# Patient Record
Sex: MALE | Race: WHITE | Employment: FULL TIME | URBAN - METROPOLITAN AREA
[De-identification: names, ages, dates, MRNs, and addresses within clinical notes are randomized per-mention and may not be internally consistent; named-entity substitution may affect disease eponyms.]

---

## 2019-07-02 ENCOUNTER — APPOINTMENT (OUTPATIENT)
Dept: GENERAL RADIOLOGY | Age: 54
DRG: 310 | End: 2019-07-02
Payer: COMMERCIAL

## 2019-07-02 ENCOUNTER — HOSPITAL ENCOUNTER (INPATIENT)
Age: 54
LOS: 5 days | Discharge: HOME | DRG: 310 | End: 2019-07-07
Attending: EMERGENCY MEDICINE | Admitting: FAMILY MEDICINE
Payer: COMMERCIAL

## 2019-07-02 DIAGNOSIS — R00.1 SYMPTOMATIC BRADYCARDIA: Primary | ICD-10-CM

## 2019-07-02 LAB
ABSOLUTE EOS #: 0.13 K/UL (ref 0–0.44)
ABSOLUTE IMMATURE GRANULOCYTE: <0.03 K/UL (ref 0–0.3)
ABSOLUTE LYMPH #: 2.74 K/UL (ref 1.1–3.7)
ABSOLUTE MONO #: 0.59 K/UL (ref 0.1–1.2)
ANION GAP SERPL CALCULATED.3IONS-SCNC: 12 MMOL/L (ref 9–17)
BASOPHILS # BLD: 1 % (ref 0–2)
BASOPHILS ABSOLUTE: 0.05 K/UL (ref 0–0.2)
BUN BLDV-MCNC: 13 MG/DL (ref 6–20)
BUN/CREAT BLD: ABNORMAL (ref 9–20)
CALCIUM SERPL-MCNC: 9.6 MG/DL (ref 8.6–10.4)
CHLORIDE BLD-SCNC: 109 MMOL/L (ref 98–107)
CO2: 19 MMOL/L (ref 20–31)
CREAT SERPL-MCNC: 1 MG/DL (ref 0.7–1.2)
DIFFERENTIAL TYPE: NORMAL
EOSINOPHILS RELATIVE PERCENT: 2 % (ref 1–4)
GFR AFRICAN AMERICAN: >60 ML/MIN
GFR NON-AFRICAN AMERICAN: >60 ML/MIN
GFR SERPL CREATININE-BSD FRML MDRD: ABNORMAL ML/MIN/{1.73_M2}
GFR SERPL CREATININE-BSD FRML MDRD: ABNORMAL ML/MIN/{1.73_M2}
GLUCOSE BLD-MCNC: 86 MG/DL (ref 70–99)
HCT VFR BLD CALC: 44.1 % (ref 40.7–50.3)
HEMOGLOBIN: 14.6 G/DL (ref 13–17)
IMMATURE GRANULOCYTES: 0 %
LYMPHOCYTES # BLD: 43 % (ref 24–43)
MAGNESIUM: 2 MG/DL (ref 1.6–2.6)
MCH RBC QN AUTO: 30.9 PG (ref 25.2–33.5)
MCHC RBC AUTO-ENTMCNC: 33.1 G/DL (ref 28.4–34.8)
MCV RBC AUTO: 93.4 FL (ref 82.6–102.9)
MONOCYTES # BLD: 9 % (ref 3–12)
NRBC AUTOMATED: 0 PER 100 WBC
PDW BLD-RTO: 12.6 % (ref 11.8–14.4)
PLATELET # BLD: 262 K/UL (ref 138–453)
PLATELET ESTIMATE: NORMAL
PMV BLD AUTO: 10.7 FL (ref 8.1–13.5)
POTASSIUM SERPL-SCNC: 3.8 MMOL/L (ref 3.7–5.3)
RBC # BLD: 4.72 M/UL (ref 4.21–5.77)
RBC # BLD: NORMAL 10*6/UL
SEG NEUTROPHILS: 45 % (ref 36–65)
SEGMENTED NEUTROPHILS ABSOLUTE COUNT: 2.79 K/UL (ref 1.5–8.1)
SODIUM BLD-SCNC: 140 MMOL/L (ref 135–144)
TROPONIN INTERP: NORMAL
TROPONIN T: NORMAL NG/ML
TROPONIN, HIGH SENSITIVITY: <6 NG/L (ref 0–22)
TSH SERPL DL<=0.05 MIU/L-ACNC: 1.77 MIU/L (ref 0.3–5)
WBC # BLD: 6.3 K/UL (ref 3.5–11.3)
WBC # BLD: NORMAL 10*3/UL

## 2019-07-02 PROCEDURE — 80048 BASIC METABOLIC PNL TOTAL CA: CPT

## 2019-07-02 PROCEDURE — 84484 ASSAY OF TROPONIN QUANT: CPT

## 2019-07-02 PROCEDURE — 83735 ASSAY OF MAGNESIUM: CPT

## 2019-07-02 PROCEDURE — 1200000000 HC SEMI PRIVATE

## 2019-07-02 PROCEDURE — 85025 COMPLETE CBC W/AUTO DIFF WBC: CPT

## 2019-07-02 PROCEDURE — 71045 X-RAY EXAM CHEST 1 VIEW: CPT

## 2019-07-02 PROCEDURE — 84443 ASSAY THYROID STIM HORMONE: CPT

## 2019-07-02 PROCEDURE — 93005 ELECTROCARDIOGRAM TRACING: CPT

## 2019-07-02 PROCEDURE — 36415 COLL VENOUS BLD VENIPUNCTURE: CPT

## 2019-07-02 PROCEDURE — 99285 EMERGENCY DEPT VISIT HI MDM: CPT

## 2019-07-02 RX ORDER — MAGNESIUM SULFATE 1 G/100ML
1 INJECTION INTRAVENOUS PRN
Status: DISCONTINUED | OUTPATIENT
Start: 2019-07-02 | End: 2019-07-07 | Stop reason: HOSPADM

## 2019-07-02 RX ORDER — PREGABALIN 100 MG/1
100 CAPSULE ORAL 3 TIMES DAILY
Status: DISCONTINUED | OUTPATIENT
Start: 2019-07-02 | End: 2019-07-07 | Stop reason: HOSPADM

## 2019-07-02 RX ORDER — BUPROPION HYDROCHLORIDE 150 MG/1
150 TABLET ORAL EVERY MORNING
Status: DISCONTINUED | OUTPATIENT
Start: 2019-07-03 | End: 2019-07-07 | Stop reason: HOSPADM

## 2019-07-02 RX ORDER — ACETAMINOPHEN 325 MG/1
650 TABLET ORAL EVERY 4 HOURS PRN
Status: DISCONTINUED | OUTPATIENT
Start: 2019-07-02 | End: 2019-07-07 | Stop reason: HOSPADM

## 2019-07-02 RX ORDER — POTASSIUM CHLORIDE 20 MEQ/1
40 TABLET, EXTENDED RELEASE ORAL PRN
Status: DISCONTINUED | OUTPATIENT
Start: 2019-07-02 | End: 2019-07-07 | Stop reason: HOSPADM

## 2019-07-02 RX ORDER — SODIUM CHLORIDE 0.9 % (FLUSH) 0.9 %
10 SYRINGE (ML) INJECTION EVERY 12 HOURS SCHEDULED
Status: DISCONTINUED | OUTPATIENT
Start: 2019-07-02 | End: 2019-07-07 | Stop reason: HOSPADM

## 2019-07-02 RX ORDER — LORAZEPAM 0.5 MG/1
0.5 TABLET ORAL NIGHTLY
Status: DISCONTINUED | OUTPATIENT
Start: 2019-07-02 | End: 2019-07-07 | Stop reason: HOSPADM

## 2019-07-02 RX ORDER — ATORVASTATIN CALCIUM 40 MG/1
40 TABLET, FILM COATED ORAL NIGHTLY
Status: DISCONTINUED | OUTPATIENT
Start: 2019-07-02 | End: 2019-07-07 | Stop reason: HOSPADM

## 2019-07-02 RX ORDER — SODIUM CHLORIDE 0.9 % (FLUSH) 0.9 %
10 SYRINGE (ML) INJECTION PRN
Status: DISCONTINUED | OUTPATIENT
Start: 2019-07-02 | End: 2019-07-07 | Stop reason: HOSPADM

## 2019-07-02 RX ORDER — POTASSIUM CHLORIDE 7.45 MG/ML
10 INJECTION INTRAVENOUS PRN
Status: DISCONTINUED | OUTPATIENT
Start: 2019-07-02 | End: 2019-07-07 | Stop reason: HOSPADM

## 2019-07-02 RX ORDER — ASPIRIN 81 MG/1
81 TABLET, CHEWABLE ORAL DAILY
Status: DISCONTINUED | OUTPATIENT
Start: 2019-07-03 | End: 2019-07-07 | Stop reason: HOSPADM

## 2019-07-02 RX ORDER — ONDANSETRON 2 MG/ML
4 INJECTION INTRAMUSCULAR; INTRAVENOUS EVERY 6 HOURS PRN
Status: DISCONTINUED | OUTPATIENT
Start: 2019-07-02 | End: 2019-07-07 | Stop reason: HOSPADM

## 2019-07-02 ASSESSMENT — ENCOUNTER SYMPTOMS
BACK PAIN: 0
EYE ITCHING: 0
EYE REDNESS: 0
CONSTIPATION: 0
NAUSEA: 0
COUGH: 0
SHORTNESS OF BREATH: 0
DIARRHEA: 0
ABDOMINAL PAIN: 0
RHINORRHEA: 0
VOMITING: 0

## 2019-07-02 ASSESSMENT — PAIN DESCRIPTION - DESCRIPTORS: DESCRIPTORS: BURNING;TIGHTNESS

## 2019-07-02 ASSESSMENT — PAIN DESCRIPTION - PAIN TYPE: TYPE: ACUTE PAIN

## 2019-07-02 ASSESSMENT — PAIN DESCRIPTION - ORIENTATION: ORIENTATION: MID

## 2019-07-02 ASSESSMENT — PAIN DESCRIPTION - LOCATION: LOCATION: CHEST

## 2019-07-02 ASSESSMENT — PAIN SCALES - GENERAL: PAINLEVEL_OUTOF10: 3

## 2019-07-02 ASSESSMENT — PAIN DESCRIPTION - FREQUENCY: FREQUENCY: CONTINUOUS

## 2019-07-02 NOTE — ED PROVIDER NOTES
children: Not on file    Years of education: Not on file    Highest education level: Not on file   Occupational History    Occupation:    Social Needs    Financial resource strain: Not on file    Food insecurity:     Worry: Not on file     Inability: Not on file    Transportation needs:     Medical: Not on file     Non-medical: Not on file   Tobacco Use    Smoking status: Former Smoker     Last attempt to quit: 2009     Years since quittin.9    Smokeless tobacco: Never Used    Tobacco comment: quit 6 years ago   Substance and Sexual Activity    Alcohol use: Yes     Comment: rarely    Drug use: No    Sexual activity: Not on file   Lifestyle    Physical activity:     Days per week: Not on file     Minutes per session: Not on file    Stress: Not on file   Relationships    Social connections:     Talks on phone: Not on file     Gets together: Not on file     Attends Zoroastrian service: Not on file     Active member of club or organization: Not on file     Attends meetings of clubs or organizations: Not on file     Relationship status: Not on file    Intimate partner violence:     Fear of current or ex partner: Not on file     Emotionally abused: Not on file     Physically abused: Not on file     Forced sexual activity: Not on file   Other Topics Concern    Not on file   Social History Narrative    Not on file       History reviewed. No pertinent family history. Allergies:  Seasonal    Home Medications:  Prior to Admission medications    Medication Sig Start Date End Date Taking?  Authorizing Provider   buPROPion (WELLBUTRIN XL) 150 MG XL tablet Take 150 mg by mouth every morning   Yes Historical Provider, MD   eszopiclone (LUNESTA) 1 MG TABS Take 1 mg by mouth nightly as needed  3/2/16  Yes Historical Provider, MD   tiZANidine (ZANAFLEX) 4 MG tablet Take 4 mg by mouth every 8 hours as needed (spasms)  3/7/16  Yes Historical Provider, MD   topiramate (TOPAMAX SPRINKLE) 25 MG capsule Troponin   Result Value Ref Range    Troponin, High Sensitivity <6 0 - 22 ng/L    Troponin T NOT REPORTED <0.03 ng/mL    Troponin Interp NOT REPORTED        IMPRESSION: Andrey Guerrier is a 48 y.o. presenting with symptomatic bradycardia. Patient's blood pressure is stable, patient denies symptoms at rest.  Patient does have substernal chest pain that is burning in nature consisting prior episodes of GERD. Will be given Protonix from the hospital.  Patient likely needs to be admitted for cardiology evaluation considering his heart rate does drop into the 30s and he is having periods of blackout. Lungs are clear, heart is normal, current heart rate 62. RADIOLOGY:  XR CHEST PORTABLE   Final Result   No acute cardiopulmonary process. EKG  Borderline prolonged NM interval, occasional PVC    All EKG's are interpreted by the Emergency Department Physician who either signs or Co-signs this chart in the absence of a cardiologist.    EMERGENCY DEPARTMENT COURSE:  Troponins negative, laboratory evaluation unremarkable. Patient admitted [facility] for symptomatic bradycardia. PROCEDURES:  None    CONSULTS:  IP CONSULT TO INTERNAL MEDICINE  IP CONSULT TO HOSPITALIST  IP CONSULT TO CARDIOLOGY    CRITICAL CARE:  None    FINAL IMPRESSION      1.  Symptomatic bradycardia          DISPOSITION / PLAN     DISPOSITION Admitted 07/02/2019 08:03:57 PM      PATIENT REFERRED TO:  Clearance DO Smiley  [address]  [address] [facility] [address]  [phone]            DISCHARGE MEDICATIONS:  Current Discharge Medication List          Yifan Andrea MD  Emergency Medicine Resident    (Please note that portions of thisnote were completed with a voice recognition program.  Efforts were made to edit the dictations but occasionally words are mis-transcribed.)        Yifan Andrea MD  07/03/19 [id number]

## 2019-07-02 NOTE — ED NOTES
Pt resting on cot, no distress at this time, family at bedside, monitor in place. Will cont to monitor.      Angie Ryan RN  07/02/19 0085

## 2019-07-03 ENCOUNTER — APPOINTMENT (OUTPATIENT)
Dept: NUCLEAR MEDICINE | Age: 54
DRG: 310 | End: 2019-07-03
Payer: COMMERCIAL

## 2019-07-03 LAB
ALBUMIN SERPL-MCNC: 3.9 G/DL (ref 3.5–5.2)
ALBUMIN/GLOBULIN RATIO: 1.6 (ref 1–2.5)
ALP BLD-CCNC: 65 U/L (ref 40–129)
ALT SERPL-CCNC: 24 U/L (ref 5–41)
ANION GAP SERPL CALCULATED.3IONS-SCNC: 11 MMOL/L (ref 9–17)
AST SERPL-CCNC: 21 U/L
BILIRUB SERPL-MCNC: 0.55 MG/DL (ref 0.3–1.2)
BUN BLDV-MCNC: 14 MG/DL (ref 6–20)
BUN/CREAT BLD: ABNORMAL (ref 9–20)
CALCIUM SERPL-MCNC: 8.6 MG/DL (ref 8.6–10.4)
CHLORIDE BLD-SCNC: 109 MMOL/L (ref 98–107)
CHOLESTEROL/HDL RATIO: 5.1
CHOLESTEROL: 178 MG/DL
CO2: 19 MMOL/L (ref 20–31)
CREAT SERPL-MCNC: 0.96 MG/DL (ref 0.7–1.2)
EKG ATRIAL RATE: 56 BPM
EKG ATRIAL RATE: 57 BPM
EKG P AXIS: 2 DEGREES
EKG P AXIS: 20 DEGREES
EKG P-R INTERVAL: 198 MS
EKG P-R INTERVAL: 202 MS
EKG Q-T INTERVAL: 442 MS
EKG Q-T INTERVAL: 456 MS
EKG QRS DURATION: 116 MS
EKG QRS DURATION: 116 MS
EKG QTC CALCULATION (BAZETT): 426 MS
EKG QTC CALCULATION (BAZETT): 443 MS
EKG R AXIS: 26 DEGREES
EKG R AXIS: 37 DEGREES
EKG T AXIS: 22 DEGREES
EKG T AXIS: 27 DEGREES
EKG VENTRICULAR RATE: 56 BPM
EKG VENTRICULAR RATE: 57 BPM
GFR AFRICAN AMERICAN: >60 ML/MIN
GFR NON-AFRICAN AMERICAN: >60 ML/MIN
GFR SERPL CREATININE-BSD FRML MDRD: ABNORMAL ML/MIN/{1.73_M2}
GFR SERPL CREATININE-BSD FRML MDRD: ABNORMAL ML/MIN/{1.73_M2}
GLUCOSE BLD-MCNC: 92 MG/DL (ref 70–99)
HCT VFR BLD CALC: 42 % (ref 40.7–50.3)
HDLC SERPL-MCNC: 35 MG/DL
HEMOGLOBIN: 14.3 G/DL (ref 13–17)
LDL CHOLESTEROL: 115 MG/DL (ref 0–130)
MAGNESIUM: 2 MG/DL (ref 1.6–2.6)
MCH RBC QN AUTO: 31.4 PG (ref 25.2–33.5)
MCHC RBC AUTO-ENTMCNC: 34 G/DL (ref 28.4–34.8)
MCV RBC AUTO: 92.3 FL (ref 82.6–102.9)
NRBC AUTOMATED: 0 PER 100 WBC
PDW BLD-RTO: 12.5 % (ref 11.8–14.4)
PLATELET # BLD: 241 K/UL (ref 138–453)
PMV BLD AUTO: 10.8 FL (ref 8.1–13.5)
POTASSIUM SERPL-SCNC: 3.7 MMOL/L (ref 3.7–5.3)
RBC # BLD: 4.55 M/UL (ref 4.21–5.77)
SODIUM BLD-SCNC: 139 MMOL/L (ref 135–144)
TOTAL PROTEIN: 6.4 G/DL (ref 6.4–8.3)
TRIGL SERPL-MCNC: 139 MG/DL
TROPONIN INTERP: NORMAL
TROPONIN T: NORMAL NG/ML
TROPONIN, HIGH SENSITIVITY: <6 NG/L (ref 0–22)
VLDLC SERPL CALC-MCNC: ABNORMAL MG/DL (ref 1–30)
WBC # BLD: 6.2 K/UL (ref 3.5–11.3)

## 2019-07-03 PROCEDURE — 84484 ASSAY OF TROPONIN QUANT: CPT

## 2019-07-03 PROCEDURE — 3430000000 HC RX DIAGNOSTIC RADIOPHARMACEUTICAL: Performed by: FAMILY MEDICINE

## 2019-07-03 PROCEDURE — 93005 ELECTROCARDIOGRAM TRACING: CPT | Performed by: NURSE PRACTITIONER

## 2019-07-03 PROCEDURE — 6370000000 HC RX 637 (ALT 250 FOR IP): Performed by: NURSE PRACTITIONER

## 2019-07-03 PROCEDURE — 1200000000 HC SEMI PRIVATE

## 2019-07-03 PROCEDURE — 2580000003 HC RX 258: Performed by: NURSE PRACTITIONER

## 2019-07-03 PROCEDURE — 2580000003 HC RX 258: Performed by: FAMILY MEDICINE

## 2019-07-03 PROCEDURE — 81003 URINALYSIS AUTO W/O SCOPE: CPT

## 2019-07-03 PROCEDURE — 83735 ASSAY OF MAGNESIUM: CPT

## 2019-07-03 PROCEDURE — 85027 COMPLETE CBC AUTOMATED: CPT

## 2019-07-03 PROCEDURE — 6360000002 HC RX W HCPCS: Performed by: NURSE PRACTITIONER

## 2019-07-03 PROCEDURE — 99223 1ST HOSP IP/OBS HIGH 75: CPT | Performed by: FAMILY MEDICINE

## 2019-07-03 PROCEDURE — 95816 EEG AWAKE AND DROWSY: CPT

## 2019-07-03 PROCEDURE — A9500 TC99M SESTAMIBI: HCPCS | Performed by: FAMILY MEDICINE

## 2019-07-03 PROCEDURE — 80053 COMPREHEN METABOLIC PANEL: CPT

## 2019-07-03 PROCEDURE — 36415 COLL VENOUS BLD VENIPUNCTURE: CPT

## 2019-07-03 PROCEDURE — 80061 LIPID PANEL: CPT

## 2019-07-03 RX ADMIN — DESMOPRESSIN ACETATE 40 MG: 0.2 TABLET ORAL at 22:13

## 2019-07-03 RX ADMIN — PREGABALIN 100 MG: 100 CAPSULE ORAL at 00:16

## 2019-07-03 RX ADMIN — LORAZEPAM 0.5 MG: 0.5 TABLET ORAL at 22:13

## 2019-07-03 RX ADMIN — TETRAKIS(2-METHOXYISOBUTYLISOCYANIDE)COPPER(I) TETRAFLUOROBORATE 35 MILLICURIE: 1 INJECTION, POWDER, LYOPHILIZED, FOR SOLUTION INTRAVENOUS at 14:40

## 2019-07-03 RX ADMIN — LORAZEPAM 0.5 MG: 0.5 TABLET ORAL at 00:16

## 2019-07-03 RX ADMIN — PREGABALIN 100 MG: 100 CAPSULE ORAL at 22:13

## 2019-07-03 RX ADMIN — SODIUM CHLORIDE, PRESERVATIVE FREE 10 ML: 5 INJECTION INTRAVENOUS at 14:40

## 2019-07-03 RX ADMIN — BUPROPION HYDROCHLORIDE 150 MG: 150 TABLET, FILM COATED, EXTENDED RELEASE ORAL at 09:46

## 2019-07-03 RX ADMIN — ENOXAPARIN SODIUM 40 MG: 40 INJECTION SUBCUTANEOUS at 09:46

## 2019-07-03 RX ADMIN — PREGABALIN 100 MG: 100 CAPSULE ORAL at 09:46

## 2019-07-03 RX ADMIN — Medication 10 ML: at 09:47

## 2019-07-03 RX ADMIN — PREGABALIN 100 MG: 100 CAPSULE ORAL at 14:56

## 2019-07-03 RX ADMIN — ASPIRIN 81 MG: 81 TABLET, CHEWABLE ORAL at 09:46

## 2019-07-03 RX ADMIN — Medication 10 ML: at 22:13

## 2019-07-03 ASSESSMENT — ENCOUNTER SYMPTOMS
DIARRHEA: 0
BLOOD IN STOOL: 0
SORE THROAT: 0
SHORTNESS OF BREATH: 1
WHEEZING: 0
NAUSEA: 0
CONSTIPATION: 0
ABDOMINAL PAIN: 0
VOICE CHANGE: 0
VOMITING: 0
SINUS PRESSURE: 0
COUGH: 0

## 2019-07-03 NOTE — PLAN OF CARE
Pt updated on plan of care, encouraged to call out as needed to prevent falls, pt verbalized understanding, will continue to monitor

## 2019-07-03 NOTE — ED NOTES
Pt resting on stretcher, no respiratory distress noted, pt updated on plan of care, will continue to monitor, call light in reach.        Shaunna Au RN  07/02/19 2009

## 2019-07-03 NOTE — ED NOTES
Report given to [address] [place] [address] on CAR 2. All questions answered at this time.      Saintclair Nick, RN  07/02/19 [id number]

## 2019-07-03 NOTE — H&P
degrees    T Axis 27 degrees       Imaging/Diagonstics:    Xr Chest Portable    Result Date: 7/2/2019  No acute cardiopulmonary process. Assessment :      Primary Problem  Symptomatic bradycardia    Active Hospital Problems    Diagnosis Date Noted    Symptomatic bradycardia [R00.1] 07/02/2019       Plan:     Patient status Admit as inpatient in the  Med/Surge    1. Symptomatic bradycardia - HR is > 60 , continue to monitor . Consult cardiology . Check Stress test . Check echo . 2. Episode of confusion . Check CT head, EEG  and neurology consult   3. H/O SVT s/p Abation in 2015 Dr Shwetha Mason     Consultations:   Patty Meghan    Patient is admitted as inpatient status because of co-morbidities listed above, severity of signs and symptoms as outlined, requirement for current medical therapies and most importantly because of direct risk to patient if care not provided in a hospital setting.     Jacques Harvey MD  7/3/2019    Copy sent to Dr. Inocencia Aguilar DO    (Please note that portions of this note were completed with a voice recognition program. Efforts were made to edit the dictations but occasionally words are mis-transcribed.)

## 2019-07-04 ENCOUNTER — APPOINTMENT (OUTPATIENT)
Dept: CT IMAGING | Age: 54
DRG: 310 | End: 2019-07-04
Payer: COMMERCIAL

## 2019-07-04 LAB
BILIRUBIN URINE: NEGATIVE
COLOR: YELLOW
COMMENT UA: NORMAL
FOLATE: 16.7 NG/ML
GLUCOSE URINE: NEGATIVE
KETONES, URINE: NEGATIVE
LEUKOCYTE ESTERASE, URINE: NEGATIVE
NITRITE, URINE: NEGATIVE
PH UA: 8 (ref 5–8)
PROTEIN UA: NEGATIVE
SPECIFIC GRAVITY UA: 1.01 (ref 1–1.03)
T. PALLIDUM, IGG: NONREACTIVE
TURBIDITY: CLEAR
URINE HGB: NEGATIVE
UROBILINOGEN, URINE: NORMAL
VITAMIN B-12: 620 PG/ML (ref 232–1245)

## 2019-07-04 PROCEDURE — 1200000000 HC SEMI PRIVATE

## 2019-07-04 PROCEDURE — 82746 ASSAY OF FOLIC ACID SERUM: CPT

## 2019-07-04 PROCEDURE — 86780 TREPONEMA PALLIDUM: CPT

## 2019-07-04 PROCEDURE — 82607 VITAMIN B-12: CPT

## 2019-07-04 PROCEDURE — 6360000002 HC RX W HCPCS: Performed by: NURSE PRACTITIONER

## 2019-07-04 PROCEDURE — 99223 1ST HOSP IP/OBS HIGH 75: CPT | Performed by: PSYCHIATRY & NEUROLOGY

## 2019-07-04 PROCEDURE — 94760 N-INVAS EAR/PLS OXIMETRY 1: CPT

## 2019-07-04 PROCEDURE — 70450 CT HEAD/BRAIN W/O DYE: CPT

## 2019-07-04 PROCEDURE — 2580000003 HC RX 258: Performed by: NURSE PRACTITIONER

## 2019-07-04 PROCEDURE — 6370000000 HC RX 637 (ALT 250 FOR IP): Performed by: NURSE PRACTITIONER

## 2019-07-04 PROCEDURE — 36415 COLL VENOUS BLD VENIPUNCTURE: CPT

## 2019-07-04 PROCEDURE — 99232 SBSQ HOSP IP/OBS MODERATE 35: CPT | Performed by: FAMILY MEDICINE

## 2019-07-04 PROCEDURE — 95819 EEG AWAKE AND ASLEEP: CPT | Performed by: PSYCHIATRY & NEUROLOGY

## 2019-07-04 RX ADMIN — ENOXAPARIN SODIUM 40 MG: 40 INJECTION SUBCUTANEOUS at 09:12

## 2019-07-04 RX ADMIN — LORAZEPAM 0.5 MG: 0.5 TABLET ORAL at 20:21

## 2019-07-04 RX ADMIN — ASPIRIN 81 MG: 81 TABLET, CHEWABLE ORAL at 09:12

## 2019-07-04 RX ADMIN — PREGABALIN 100 MG: 100 CAPSULE ORAL at 20:21

## 2019-07-04 RX ADMIN — PREGABALIN 100 MG: 100 CAPSULE ORAL at 09:12

## 2019-07-04 RX ADMIN — DESMOPRESSIN ACETATE 40 MG: 0.2 TABLET ORAL at 20:21

## 2019-07-04 RX ADMIN — ACETAMINOPHEN 650 MG: 325 TABLET ORAL at 15:03

## 2019-07-04 RX ADMIN — PREGABALIN 100 MG: 100 CAPSULE ORAL at 14:27

## 2019-07-04 RX ADMIN — Medication 10 ML: at 20:22

## 2019-07-04 RX ADMIN — Medication 10 ML: at 10:32

## 2019-07-04 RX ADMIN — BUPROPION HYDROCHLORIDE 150 MG: 150 TABLET, FILM COATED, EXTENDED RELEASE ORAL at 09:11

## 2019-07-04 RX ADMIN — ACETAMINOPHEN 650 MG: 325 TABLET ORAL at 19:22

## 2019-07-04 ASSESSMENT — ENCOUNTER SYMPTOMS
SORE THROAT: 0
SHORTNESS OF BREATH: 0
SINUS PRESSURE: 0
WHEEZING: 0
VOMITING: 0
ABDOMINAL PAIN: 0
VOICE CHANGE: 0
BLOOD IN STOOL: 0
NAUSEA: 0
CONSTIPATION: 0
COUGH: 0
DIARRHEA: 0

## 2019-07-04 ASSESSMENT — PAIN SCALES - GENERAL
PAINLEVEL_OUTOF10: 4
PAINLEVEL_OUTOF10: 0
PAINLEVEL_OUTOF10: 4

## 2019-07-04 NOTE — PROGRESS NOTES
mouth 3 times daily      traZODone (DESYREL) 50 MG tablet Take 50 mg by mouth nightly as needed for Sleep          Allergies: Beba Casey is allergic to seasonal.    Past Medical History:   Diagnosis Date    Arthritis     left knee, and bilateral hands    Asthma     Back pain     H/O cardiac radiofrequency ablation January and March 2015    Headache(784.0)     SVT (supraventricular tachycardia) [facility])        Past Surgical History:   Procedure Laterality Date    ANKLE SURGERY Right     mole removed    CARDIAC SURGERY  1-    SVT ablation    CHOLECYSTECTOMY      KNEE ARTHROSCOPY Left     KNEE SURGERY Left     scraped arthritis off    ROTATOR CUFF REPAIR Left     ROTATOR CUFF REPAIR Left     TONSILLECTOMY      adnoids           Medications:     buPROPion  150 mg Oral QAM    LORazepam  0.5 mg Oral Nightly    pregabalin  100 mg Oral TID    sodium chloride flush  10 mL Intravenous 2 times per day    atorvastatin  40 mg Oral Nightly    enoxaparin  40 mg Subcutaneous Daily    aspirin  81 mg Oral Daily     PRN Meds include: sodium chloride flush, potassium chloride **OR** potassium alternative oral replacement **OR** potassium chloride, potassium chloride, magnesium sulfate, magnesium hydroxide, ondansetron, acetaminophen    Objective:   /80   Pulse 55   Temp 98.1 °F (36.7 °C) (Oral)   Resp 14   Ht 6' 4\" (1.93 m)   Wt 210 lb (95.3 kg)   SpO2 97%   BMI 25.56 kg/m²     Blood pressure range: Systolic (75NYK), RUE:188 , Min:113 , EYA:883   ; Diastolic (92UXS), QWE:97, Min:80, Max:89        NEUROLOGIC EXAMINATION  GENERAL  Appears comfortable and in no distress   HEENT  NC/ AT   cardiovascular  S1 and S2 heard; palpation of pulses: radial pulse    NECK  Supple and no bruits heard   MENTAL STATUS:  Alert, oriented, intact memory, no confusion, normal speech, normal language, no hallucination or delusion   CRANIAL NERVES: II     -      PERRLA, optic discs; clear; posterior

## 2019-07-04 NOTE — CONSULTS
Activity    Alcohol use: Yes     Comment: rarely    Drug use: No    Sexual activity: Not on file   Lifestyle    Physical activity:     Days per week: Not on file     Minutes per session: Not on file    Stress: Not on file   Relationships    Social connections:     Talks on phone: Not on file     Gets together: Not on file     Attends Orthodoxy service: Not on file     Active member of club or organization: Not on file     Attends meetings of clubs or organizations: Not on file     Relationship status: Not on file    Intimate partner violence:     Fear of current or ex partner: Not on file     Emotionally abused: Not on file     Physically abused: Not on file     Forced sexual activity: Not on file   Other Topics Concern    Not on file   Social History Narrative    Not on file     Family History: Pt was adopted and does not know family history   History reviewed. No pertinent family history.     · REVIEW OF SYSTEMS   CONSTITUTIONAL: negative except for  fatigue  EYES:  negative  HEENT:  negative  RESPIRATORY: negative except for  dyspnea on exertion and history of asthma   CARDIOVASCULAR:  positive for  chest pain, dyspnea, palpitations, fatigue  negative for  orthopnea, PND, exertional chest pressure/discomfort, syncope  GASTROINTESTINAL:  negative  GENITOURINARY:  negative  INTEGUMENT/BREAST:  negative  HEMATOLOGIC/LYMPHATIC:  negative  ALLERGIC/IMMUNOLOGIC:  negative except for seasonal allergies  ENDOCRINE:  Negative, no history of diabetes  MUSCULOSKELETAL:  negative except for  Chronic back pain  NEUROLOGICAL:  Negative except for confusion 6 days ago, no history of CVA    PHYSICAL EXAM:    Vitals:    VITALS:  /80   Pulse 55   Temp 98.1 °F (36.7 °C) (Oral)   Resp 14   Ht 6' 4\" (1.93 m)   Wt 210 lb (95.3 kg)   SpO2 97%   BMI 25.56 kg/m²   24HR INTAKE/OUTPUT:      Intake/Output Summary (Last 24 hours) at 7/4/2019 1238  Last data filed at 7/3/2019 1714  Gross per 24 hour   Intake --   Output
toes and heel walking, Negative Romberg's              Additional Examination Elements and Findings:     LUNGS:  No increased work of breathing, good air exchange, clear to auscultation bilaterally, no crackles or wheezing  CARDIOVASCULAR:  Normal apical impulse, regular rate and rhythm, normal S1 and S2, no S3 or S4, and no murmur noted    DATA  Recent Results (from the past 24 hour(s))   Troponin    Collection Time: 07/02/19 10:46 PM   Result Value Ref Range    Troponin, High Sensitivity <6 0 - 22 ng/L    Troponin T NOT REPORTED <0.03 ng/mL    Troponin Interp NOT REPORTED    Comprehensive Metabolic Panel w/ Reflex to MG    Collection Time: 07/03/19  3:12 AM   Result Value Ref Range    Glucose 92 70 - 99 mg/dL    BUN 14 6 - 20 mg/dL    CREATININE 0.96 0.70 - 1.20 mg/dL    Bun/Cre Ratio NOT REPORTED 9 - 20    Calcium 8.6 8.6 - 10.4 mg/dL    Sodium 139 135 - 144 mmol/L    Potassium 3.7 3.7 - 5.3 mmol/L    Chloride 109 (H) 98 - 107 mmol/L    CO2 19 (L) 20 - 31 mmol/L    Anion Gap 11 9 - 17 mmol/L    Alkaline Phosphatase 65 40 - 129 U/L    ALT 24 5 - 41 U/L    AST 21 <40 U/L    Total Bilirubin 0.55 0.3 - 1.2 mg/dL    Total Protein 6.4 6.4 - 8.3 g/dL    Alb 3.9 3.5 - 5.2 g/dL    Albumin/Globulin Ratio 1.6 1.0 - 2.5    GFR Non-African American >60 >60 mL/min    GFR African American >60 >60 mL/min    GFR Comment          GFR Staging NOT REPORTED    Magnesium    Collection Time: 07/03/19  3:12 AM   Result Value Ref Range    Magnesium 2.0 1.6 - 2.6 mg/dL   Lipid panel - fasting    Collection Time: 07/03/19  3:12 AM   Result Value Ref Range    Cholesterol 178 <200 mg/dL    HDL 35 (L) >40 mg/dL    LDL Cholesterol 115 0 - 130 mg/dL    Chol/HDL Ratio 5.1 (H) <5    Triglycerides 139 <150 mg/dL    VLDL NOT REPORTED 1 - 30 mg/dL   CBC    Collection Time: 07/03/19  3:12 AM   Result Value Ref Range    WBC 6.2 3.5 - 11.3 k/uL    RBC 4.55 4.21 - 5.77 m/uL    Hemoglobin 14.3 13.0 - 17.0 g/dL    Hematocrit 42.0 40.7 - 50.3 %    MCV 92.3

## 2019-07-04 NOTE — PROCEDURES
EEG REPORT       Patient: Jhonny Johnson Age: 48 y.o. MRN: [id number]    Date: 7/2/2019  Referring Provider: No ref. provider found    History: This routine 30 minute scalp EEG was recorded with video- monitoring for a 48 y.o.. male  who presented with encephalopathy. This EEG was performed to evaluate for focal and epileptiform abnormalities.      Julien Ramos   Current Facility-Administered Medications   Medication Dose Route Frequency Provider Last Rate Last Dose    buPROPion (WELLBUTRIN XL) extended release tablet 150 mg  150 mg Oral QAM [place] Danger, APRN - CNP   150 mg at 07/04/19 0911    LORazepam (ATIVAN) tablet 0.5 mg  0.5 mg Oral Nightly [place] Danger, APRN - CNP   0.5 mg at 07/03/19 2213    pregabalin (LYRICA) capsule 100 mg  100 mg Oral TID [place] Danger, APRN - CNP   100 mg at 07/04/19 0912    sodium chloride flush 0.9 % injection 10 mL  10 mL Intravenous 2 times per day Walker Danger, APRN - CNP   10 mL at 07/03/19 2213    sodium chloride flush 0.9 % injection 10 mL  10 mL Intravenous PRN [place] Danger, APRN - CNP        potassium chloride (KLOR-CON M) extended release tablet 40 mEq  40 mEq Oral PRN [place] Danger, APRN - CNP        Or    potassium bicarb-citric acid (EFFER-K) effervescent tablet 40 mEq  40 mEq Oral PRN [place] Danger, APRN - CNP        Or    potassium chloride 10 mEq/100 mL IVPB (Peripheral Line)  10 mEq Intravenous PRN Walker Danger, APRN - CNP        potassium chloride 10 mEq/100 mL IVPB (Peripheral Line)  10 mEq Intravenous PRN Walker Danger, APRN - CNP        magnesium sulfate 1 g in dextrose 5% 100 mL IVPB  1 g Intravenous PRN Walker Danger, APRN - CNP        magnesium hydroxide (MILK OF MAGNESIA) 400 MG/5ML suspension 30 mL  30 mL Oral Daily PRN [place] Danger, APRN - CNP        ondansetron [facility]) injection 4 mg  4 mg Intravenous Q6H PRN Walker Danger, APRN -

## 2019-07-04 NOTE — PROGRESS NOTES
times per day   atorvastatin 40 mg Oral Nightly   enoxaparin 40 mg Subcutaneous Daily   aspirin 81 mg Oral Daily       Review of Systems   Review of Systems   Constitutional: Negative for activity change, appetite change, chills, fatigue, fever and unexpected weight change. HENT: Negative for congestion, mouth sores, postnasal drip, sinus pressure, sore throat and voice change. Eyes: Negative for visual disturbance. Respiratory: Negative for cough, shortness of breath and wheezing. Cardiovascular: Negative for chest pain and palpitations. Gastrointestinal: Negative for abdominal pain, blood in stool, constipation, diarrhea, nausea and vomiting. Endocrine: Negative for polyuria. Genitourinary: Negative for difficulty urinating, dysuria, frequency and urgency. Musculoskeletal: Negative for arthralgias, joint swelling and myalgias. Neurological: Negative for dizziness, tremors, speech difficulty, light-headedness and headaches. Objective :      Current Vitals : Temp: 98.1 °F (36.7 °C),  Pulse: 55, Resp: 14, BP: 113/80, SpO2: 97 %  Last 24 Hrs Vitals   Patient Vitals for the past 24 hrs:   BP Temp Temp src Pulse Resp SpO2   07/04/19 0740 113/80 98.1 °F (36.7 °C) Oral 55 14 97 %   07/03/19 2021 132/89 97.8 °F (36.6 °C) Oral 80 14 97 %     Intake / output   07/03 0701 - 07/04 0700  In: -   Out: 1100 [Urine:1100]  Physical Exam:  Physical Exam   Constitutional: He is oriented to person, place, and time. No distress. HENT:   Head: Normocephalic and atraumatic. Mouth/Throat: Oropharynx is clear and moist. No oropharyngeal exudate. Eyes: Pupils are equal, round, and reactive to light. Conjunctivae and EOM are normal. No scleral icterus. Neck: No JVD present. No thyromegaly present. Cardiovascular: Normal rate, regular rhythm and normal heart sounds. No murmur heard. Pulmonary/Chest: Effort normal and breath sounds normal. He has no wheezes. He has no rales. Abdominal: Soft.  He exhibits

## 2019-07-05 ENCOUNTER — APPOINTMENT (OUTPATIENT)
Dept: MRI IMAGING | Age: 54
DRG: 310 | End: 2019-07-05
Payer: COMMERCIAL

## 2019-07-05 ENCOUNTER — APPOINTMENT (OUTPATIENT)
Dept: NUCLEAR MEDICINE | Age: 54
DRG: 310 | End: 2019-07-05
Payer: COMMERCIAL

## 2019-07-05 ENCOUNTER — APPOINTMENT (OUTPATIENT)
Dept: GENERAL RADIOLOGY | Age: 54
DRG: 310 | End: 2019-07-05
Payer: COMMERCIAL

## 2019-07-05 LAB
ANION GAP SERPL CALCULATED.3IONS-SCNC: 14 MMOL/L (ref 9–17)
BUN BLDV-MCNC: 15 MG/DL (ref 6–20)
BUN/CREAT BLD: ABNORMAL (ref 9–20)
CALCIUM SERPL-MCNC: 8.6 MG/DL (ref 8.6–10.4)
CHLORIDE BLD-SCNC: 105 MMOL/L (ref 98–107)
CO2: 19 MMOL/L (ref 20–31)
CREAT SERPL-MCNC: 0.94 MG/DL (ref 0.7–1.2)
GFR AFRICAN AMERICAN: >60 ML/MIN
GFR NON-AFRICAN AMERICAN: >60 ML/MIN
GFR SERPL CREATININE-BSD FRML MDRD: ABNORMAL ML/MIN/{1.73_M2}
GFR SERPL CREATININE-BSD FRML MDRD: ABNORMAL ML/MIN/{1.73_M2}
GLUCOSE BLD-MCNC: 153 MG/DL (ref 70–99)
HCT VFR BLD CALC: 45.4 % (ref 40.7–50.3)
HEMOGLOBIN: 15.6 G/DL (ref 13–17)
LACTIC ACID, WHOLE BLOOD: 3 MMOL/L (ref 0.7–2.1)
LV EF: 55 %
LV EF: 60 %
LVEF MODALITY: NORMAL
LVEF MODALITY: NORMAL
MCH RBC QN AUTO: 31.5 PG (ref 25.2–33.5)
MCHC RBC AUTO-ENTMCNC: 34.4 G/DL (ref 28.4–34.8)
MCV RBC AUTO: 91.7 FL (ref 82.6–102.9)
NRBC AUTOMATED: 0 PER 100 WBC
PDW BLD-RTO: 12.5 % (ref 11.8–14.4)
PLATELET # BLD: 226 K/UL (ref 138–453)
PMV BLD AUTO: 10.5 FL (ref 8.1–13.5)
POTASSIUM SERPL-SCNC: 4.2 MMOL/L (ref 3.7–5.3)
RBC # BLD: 4.95 M/UL (ref 4.21–5.77)
SODIUM BLD-SCNC: 138 MMOL/L (ref 135–144)
WBC # BLD: 11.5 K/UL (ref 3.5–11.3)

## 2019-07-05 PROCEDURE — 78452 HT MUSCLE IMAGE SPECT MULT: CPT

## 2019-07-05 PROCEDURE — 36415 COLL VENOUS BLD VENIPUNCTURE: CPT

## 2019-07-05 PROCEDURE — 3430000000 HC RX DIAGNOSTIC RADIOPHARMACEUTICAL: Performed by: FAMILY MEDICINE

## 2019-07-05 PROCEDURE — 6360000002 HC RX W HCPCS: Performed by: FAMILY MEDICINE

## 2019-07-05 PROCEDURE — 1200000000 HC SEMI PRIVATE

## 2019-07-05 PROCEDURE — 6360000002 HC RX W HCPCS: Performed by: NURSE PRACTITIONER

## 2019-07-05 PROCEDURE — 80048 BASIC METABOLIC PNL TOTAL CA: CPT

## 2019-07-05 PROCEDURE — 71045 X-RAY EXAM CHEST 1 VIEW: CPT

## 2019-07-05 PROCEDURE — 6370000000 HC RX 637 (ALT 250 FOR IP): Performed by: FAMILY MEDICINE

## 2019-07-05 PROCEDURE — 6370000000 HC RX 637 (ALT 250 FOR IP): Performed by: NURSE PRACTITIONER

## 2019-07-05 PROCEDURE — 93005 ELECTROCARDIOGRAM TRACING: CPT | Performed by: FAMILY MEDICINE

## 2019-07-05 PROCEDURE — 2580000003 HC RX 258: Performed by: FAMILY MEDICINE

## 2019-07-05 PROCEDURE — 87040 BLOOD CULTURE FOR BACTERIA: CPT

## 2019-07-05 PROCEDURE — 94760 N-INVAS EAR/PLS OXIMETRY 1: CPT

## 2019-07-05 PROCEDURE — 93017 CV STRESS TEST TRACING ONLY: CPT | Performed by: NURSE PRACTITIONER

## 2019-07-05 PROCEDURE — 93306 TTE W/DOPPLER COMPLETE: CPT

## 2019-07-05 PROCEDURE — A9500 TC99M SESTAMIBI: HCPCS | Performed by: FAMILY MEDICINE

## 2019-07-05 PROCEDURE — 2500000003 HC RX 250 WO HCPCS

## 2019-07-05 PROCEDURE — 70551 MRI BRAIN STEM W/O DYE: CPT

## 2019-07-05 PROCEDURE — 99232 SBSQ HOSP IP/OBS MODERATE 35: CPT | Performed by: FAMILY MEDICINE

## 2019-07-05 PROCEDURE — 2580000003 HC RX 258: Performed by: NURSE PRACTITIONER

## 2019-07-05 PROCEDURE — 85027 COMPLETE CBC AUTOMATED: CPT

## 2019-07-05 PROCEDURE — 83605 ASSAY OF LACTIC ACID: CPT

## 2019-07-05 RX ORDER — SODIUM CHLORIDE 0.9 % (FLUSH) 0.9 %
10 SYRINGE (ML) INJECTION PRN
Status: DISCONTINUED | OUTPATIENT
Start: 2019-07-05 | End: 2019-07-05

## 2019-07-05 RX ORDER — BUTALBITAL, ACETAMINOPHEN AND CAFFEINE 50; 325; 40 MG/1; MG/1; MG/1
1 TABLET ORAL EVERY 4 HOURS PRN
Status: DISCONTINUED | OUTPATIENT
Start: 2019-07-05 | End: 2019-07-07 | Stop reason: HOSPADM

## 2019-07-05 RX ORDER — ALBUTEROL SULFATE 90 UG/1
2 AEROSOL, METERED RESPIRATORY (INHALATION) PRN
Status: DISCONTINUED | OUTPATIENT
Start: 2019-07-05 | End: 2019-07-05

## 2019-07-05 RX ORDER — AMINOPHYLLINE DIHYDRATE 25 MG/ML
50 INJECTION, SOLUTION INTRAVENOUS PRN
Status: DISCONTINUED | OUTPATIENT
Start: 2019-07-05 | End: 2019-07-05

## 2019-07-05 RX ORDER — SODIUM CHLORIDE 0.9 % (FLUSH) 0.9 %
10 SYRINGE (ML) INJECTION PRN
Status: DISCONTINUED | OUTPATIENT
Start: 2019-07-05 | End: 2019-07-07 | Stop reason: HOSPADM

## 2019-07-05 RX ORDER — METOPROLOL TARTRATE 5 MG/5ML
5 INJECTION INTRAVENOUS EVERY 6 HOURS PRN
Status: DISCONTINUED | OUTPATIENT
Start: 2019-07-05 | End: 2019-07-07 | Stop reason: HOSPADM

## 2019-07-05 RX ORDER — METOPROLOL TARTRATE 5 MG/5ML
INJECTION INTRAVENOUS
Status: COMPLETED
Start: 2019-07-05 | End: 2019-07-05

## 2019-07-05 RX ORDER — ATROPINE SULFATE 0.1 MG/ML
0.5 INJECTION INTRAVENOUS EVERY 5 MIN PRN
Status: DISCONTINUED | OUTPATIENT
Start: 2019-07-05 | End: 2019-07-05

## 2019-07-05 RX ORDER — NITROGLYCERIN 0.4 MG/1
0.4 TABLET SUBLINGUAL EVERY 5 MIN PRN
Status: DISCONTINUED | OUTPATIENT
Start: 2019-07-05 | End: 2019-07-05

## 2019-07-05 RX ORDER — SODIUM CHLORIDE 9 MG/ML
500 INJECTION, SOLUTION INTRAVENOUS CONTINUOUS PRN
Status: DISCONTINUED | OUTPATIENT
Start: 2019-07-05 | End: 2019-07-05

## 2019-07-05 RX ORDER — METOPROLOL TARTRATE 5 MG/5ML
5 INJECTION INTRAVENOUS ONCE
Status: COMPLETED | OUTPATIENT
Start: 2019-07-05 | End: 2019-07-05

## 2019-07-05 RX ORDER — METOPROLOL TARTRATE 5 MG/5ML
5 INJECTION INTRAVENOUS EVERY 5 MIN PRN
Status: DISCONTINUED | OUTPATIENT
Start: 2019-07-05 | End: 2019-07-05

## 2019-07-05 RX ADMIN — REGADENOSON 0.4 MG: 0.08 INJECTION, SOLUTION INTRAVENOUS at 10:52

## 2019-07-05 RX ADMIN — BUTALBITAL, ACETAMINOPHEN, AND CAFFEINE 1 TABLET: 50; 325; 40 TABLET ORAL at 13:29

## 2019-07-05 RX ADMIN — LORAZEPAM 0.5 MG: 0.5 TABLET ORAL at 20:42

## 2019-07-05 RX ADMIN — ENOXAPARIN SODIUM 40 MG: 40 INJECTION SUBCUTANEOUS at 08:22

## 2019-07-05 RX ADMIN — Medication 10 ML: at 08:23

## 2019-07-05 RX ADMIN — PREGABALIN 100 MG: 100 CAPSULE ORAL at 15:49

## 2019-07-05 RX ADMIN — Medication 10 ML: at 20:43

## 2019-07-05 RX ADMIN — METOPROLOL TARTRATE 5 MG: 5 INJECTION INTRAVENOUS at 19:00

## 2019-07-05 RX ADMIN — PREGABALIN 100 MG: 100 CAPSULE ORAL at 20:42

## 2019-07-05 RX ADMIN — METOPROLOL TARTRATE 5 MG: 5 INJECTION, SOLUTION INTRAVENOUS at 19:00

## 2019-07-05 RX ADMIN — PREGABALIN 100 MG: 100 CAPSULE ORAL at 08:22

## 2019-07-05 RX ADMIN — BUPROPION HYDROCHLORIDE 150 MG: 150 TABLET, FILM COATED, EXTENDED RELEASE ORAL at 08:22

## 2019-07-05 RX ADMIN — ACETAMINOPHEN 650 MG: 325 TABLET ORAL at 19:20

## 2019-07-05 RX ADMIN — Medication 10 ML: at 10:46

## 2019-07-05 RX ADMIN — DESMOPRESSIN ACETATE 40 MG: 0.2 TABLET ORAL at 20:42

## 2019-07-05 RX ADMIN — SODIUM CHLORIDE, PRESERVATIVE FREE 10 ML: 5 INJECTION INTRAVENOUS at 10:52

## 2019-07-05 RX ADMIN — BUTALBITAL, ACETAMINOPHEN, AND CAFFEINE 1 TABLET: 50; 325; 40 TABLET ORAL at 18:32

## 2019-07-05 RX ADMIN — ASPIRIN 81 MG: 81 TABLET, CHEWABLE ORAL at 08:22

## 2019-07-05 RX ADMIN — TETRAKIS(2-METHOXYISOBUTYLISOCYANIDE)COPPER(I) TETRAFLUOROBORATE 35.3 MILLICURIE: 1 INJECTION, POWDER, LYOPHILIZED, FOR SOLUTION INTRAVENOUS at 10:52

## 2019-07-05 ASSESSMENT — PAIN SCALES - GENERAL
PAINLEVEL_OUTOF10: 4
PAINLEVEL_OUTOF10: 0
PAINLEVEL_OUTOF10: 6
PAINLEVEL_OUTOF10: 5

## 2019-07-05 ASSESSMENT — ENCOUNTER SYMPTOMS
BLOOD IN STOOL: 0
WHEEZING: 0
SINUS PRESSURE: 0
DIARRHEA: 0
VOMITING: 0
NAUSEA: 0
SORE THROAT: 0
SHORTNESS OF BREATH: 0
VOICE CHANGE: 0
ABDOMINAL PAIN: 0
COUGH: 0
CONSTIPATION: 0

## 2019-07-05 NOTE — PROGRESS NOTES
NEUROLOGY INPATIENT PROGRESS NOTE    7/5/2019         Subjective: Kirstin Quintanilla is a  48 y.o. male admitted on 7/2/2019 with Symptomatic bradycardia [R00.1]      Briefly, this is a  48 y.o. male admitted on 7/2/2019 with significant past medical history of headaches, GERD, insomnia, asthma, supraventricular tachycardia s/p ablation x2 (1/2015 and 3/2015) who presents with bradycardia and confusion/memory loss. Patient reported 6d prior to admit he experienced an episode of retrograde amnesia wherein he was in the bath and suddenly his next memory was at work already working. The gap of memory was approximately 3 hours long, witnesses including his daughter reported there was no visible change in behaviour, able to  Walk, talk, drive, answer questions, take daughter to mall and other purposeful tasks. Patient reports this has happened approximately 3-5 times prior since his ablation in 2015, has not happened prior to that. Patient reports having frequent episodes of bradycardia. Initially reported to his PCP who advised himt o track on pulse ox, showed frequent lowering to that range when relaxing, appropriate elevation to 80-90 when exercising. Pt presented ot [facility] for evaluation on advise of PCP. On arrival symptoms have not recurred. Patient was NSR with HR in 70s on my exam HR mostly between 50-80 during hospital stay. SBP noted between 113-150. CURRENT EVALUATION: 7/5/2019  Patient seen and examined, no acute events overnight  No further eps of confusion/amnesia reported  Some dizziness reported on standing and walking, did report being 'unable to walk straight' although normal gait on my exam  Evaluation by cardiology ytd, reported no bradycardia noted on monitor  Cardiac stress test planned for today        No current facility-administered medications on file prior to encounter.       Current Outpatient Medications on File Prior to Encounter   Medication Sig Dispense Refill    buPROPion [facility]

## 2019-07-05 NOTE — PROGRESS NOTES
[facility]      Daily Progress Note     Admit Date: 7/2/2019  Bed/Room No.  2017/2017-01  Admitting Physician : Rj Gilmore MD  Code Status :[address] Day:  LOS: 3 days   Chief Complaint:     Chief Complaint   Patient presents with    Bradycardia     Pt reports HR dropping down into the 40s     Principal Problem:    Symptomatic bradycardia  Active Problems:    Confusion    S/P ablation operation for arrhythmia    Retrograde amnesia  Resolved Problems:    * No resolved hospital problems. *    Subjective : Interval History/Significant events :  07/05/19    Patient remains in normal sinus rhythm. No bradycardia . He denies any dizziness , lightheadedness . underwent stress test today . No chest pain . Vitals - Stable afebrile  Labs -no new labs     Nursing notes , Consults notes reviewed. Overnight events and updates discussed with Nursing staff . Background History:         Hector Barnard is 48 y.o. male  Who was admitted to the hospital on 7/2/2019 for treatment of Symptomatic bradycardia. Patient had episode of confusion about 6 days ago that lasted for about 3 hrs while he was at work. He was with his girlfriend and has no recollection of the events . He had another episode of  dizziness, lightheadedness on day of admission. He reported low heart rate for many months. He also reported shortness of breath on exertion. Patient had history of SVT s/p ablation in 2015×2. Initial evaluation showed heart rate of 57. PMH:  Past Medical History:   Diagnosis Date    Arthritis     left knee, and bilateral hands    Asthma     Back pain     H/O cardiac radiofrequency ablation January and March 2015    Headache(784.0)     SVT (supraventricular tachycardia) (HCC)       Allergies:    Allergies   Allergen Reactions    Seasonal Itching, Dermatitis and Rash      Medications :    buPROPion 150 mg Oral QAM   LORazepam 0.5 mg Oral Nightly   pregabalin 100 mg sounds normal. He has no wheezes. He has no rales. Abdominal: Soft. He exhibits no mass. There is no tenderness. Lymphadenopathy:     He has no cervical adenopathy. Neurological: He is alert and oriented to person, place, and time. Skin: He is not diaphoretic. Nursing note and vitals reviewed. Lower Extremities : No ankle Edema , No calf Tenderness     Laboratory findings:    Recent Labs     07/02/19 1712 07/03/19  0312   WBC 6.3 6.2   HGB 14.6 14.3   HCT 44.1 42.0    241     Recent Labs     07/02/19 1712 07/02/19 1749 07/03/19 0312     --  139   K 3.8  --  3.7   *  --  109*   CO2 19*  --  19*   GLUCOSE 86  --  92   BUN 13  --  14   CREATININE 1.00  --  0.96   MG  --  2.0 2.0   CALCIUM 9.6  --  8.6     Recent Labs     07/02/19 1749 07/03/19 0312   PROT  --  6.4   LABALBU  --  3.9   TSH 1.77  --    AST  --  21   ALT  --  24   ALKPHOS  --  65   BILITOT  --  0.55   CHOL  --  178   HDL  --  35*   LDLCHOLESTEROL  --  115   CHOLHDLRATIO  --  5.1*   TRIG  --  139   VLDL  --  NOT REPORTED          Specific Gravity, UA   Date Value Ref Range Status   07/03/2019 1.010 1.005 - 1.030 Final     Protein, UA   Date Value Ref Range Status   07/03/2019 NEGATIVE NEGATIVE Final     Nitrite, Urine   Date Value Ref Range Status   07/03/2019 NEGATIVE NEGATIVE Final     Leukocyte Esterase, Urine   Date Value Ref Range Status   07/03/2019 NEGATIVE NEGATIVE Final       Imaging / Clinical Data :-   XRChest Portable    Result Date: 7/2/2019  No acute cardiopulmonary process. Clinical Course : stable  Assessment and Plan  :        1. Symptomatic bradycardia - HR is > 60 , continue to monitor . Stress test pending , echo pending . Cardiology consulted. .   2. Episode of confusion . Negative  CT head, EEG negative for seizures. MRI brain pending   3. H/O SVT s/p Abation in 2015 Dr Clayton Latif. Discharge planning if stress test negative . Follow Dr Clayton Latif as outpatient . Dr Clayton Latif updated.  Dr Kaitlin William to

## 2019-07-06 LAB
-: NORMAL
AMORPHOUS: NORMAL
BACTERIA: NORMAL
BILIRUBIN URINE: NEGATIVE
CASTS UA: NORMAL /LPF (ref 0–8)
COLOR: ABNORMAL
COMMENT UA: ABNORMAL
CRYSTALS, UA: NORMAL /HPF
EPITHELIAL CELLS UA: NORMAL /HPF (ref 0–5)
GLUCOSE URINE: NEGATIVE
KETONES, URINE: NEGATIVE
LACTIC ACID, WHOLE BLOOD: 2.2 MMOL/L (ref 0.7–2.1)
LEUKOCYTE ESTERASE, URINE: NEGATIVE
MUCUS: NORMAL
NITRITE, URINE: NEGATIVE
OTHER OBSERVATIONS UA: NORMAL
PH UA: 5 (ref 5–8)
PROCALCITONIN: 1.03 NG/ML
PROTEIN UA: ABNORMAL
RBC UA: NORMAL /HPF (ref 0–4)
RENAL EPITHELIAL, UA: NORMAL /HPF
SPECIFIC GRAVITY UA: 1.03 (ref 1–1.03)
TRICHOMONAS: NORMAL
TURBIDITY: CLEAR
URINE HGB: NEGATIVE
UROBILINOGEN, URINE: NORMAL
WBC UA: NORMAL /HPF (ref 0–5)
YEAST: NORMAL

## 2019-07-06 PROCEDURE — 2580000003 HC RX 258: Performed by: FAMILY MEDICINE

## 2019-07-06 PROCEDURE — 1200000000 HC SEMI PRIVATE

## 2019-07-06 PROCEDURE — 2580000003 HC RX 258: Performed by: NURSE PRACTITIONER

## 2019-07-06 PROCEDURE — 84145 PROCALCITONIN (PCT): CPT

## 2019-07-06 PROCEDURE — 6360000002 HC RX W HCPCS: Performed by: FAMILY MEDICINE

## 2019-07-06 PROCEDURE — 6370000000 HC RX 637 (ALT 250 FOR IP): Performed by: FAMILY MEDICINE

## 2019-07-06 PROCEDURE — 6370000000 HC RX 637 (ALT 250 FOR IP): Performed by: INTERNAL MEDICINE

## 2019-07-06 PROCEDURE — 36415 COLL VENOUS BLD VENIPUNCTURE: CPT

## 2019-07-06 PROCEDURE — 6360000002 HC RX W HCPCS: Performed by: NURSE PRACTITIONER

## 2019-07-06 PROCEDURE — 81001 URINALYSIS AUTO W/SCOPE: CPT

## 2019-07-06 PROCEDURE — 99233 SBSQ HOSP IP/OBS HIGH 50: CPT | Performed by: PSYCHIATRY & NEUROLOGY

## 2019-07-06 PROCEDURE — 6370000000 HC RX 637 (ALT 250 FOR IP): Performed by: NURSE PRACTITIONER

## 2019-07-06 PROCEDURE — 6360000002 HC RX W HCPCS: Performed by: PSYCHIATRY & NEUROLOGY

## 2019-07-06 PROCEDURE — 94760 N-INVAS EAR/PLS OXIMETRY 1: CPT

## 2019-07-06 PROCEDURE — 87040 BLOOD CULTURE FOR BACTERIA: CPT

## 2019-07-06 PROCEDURE — 99233 SBSQ HOSP IP/OBS HIGH 50: CPT | Performed by: FAMILY MEDICINE

## 2019-07-06 PROCEDURE — 99254 IP/OBS CNSLTJ NEW/EST MOD 60: CPT | Performed by: INTERNAL MEDICINE

## 2019-07-06 PROCEDURE — 86789 WEST NILE VIRUS ANTIBODY: CPT

## 2019-07-06 PROCEDURE — 2580000003 HC RX 258: Performed by: PSYCHIATRY & NEUROLOGY

## 2019-07-06 PROCEDURE — 83605 ASSAY OF LACTIC ACID: CPT

## 2019-07-06 PROCEDURE — 86788 WEST NILE VIRUS AB IGM: CPT

## 2019-07-06 RX ORDER — DOXYCYCLINE HYCLATE 100 MG
100 TABLET ORAL EVERY 12 HOURS SCHEDULED
Status: DISCONTINUED | OUTPATIENT
Start: 2019-07-06 | End: 2019-07-07 | Stop reason: HOSPADM

## 2019-07-06 RX ORDER — SODIUM CHLORIDE 9 MG/ML
INJECTION, SOLUTION INTRAVENOUS CONTINUOUS
Status: DISCONTINUED | OUTPATIENT
Start: 2019-07-06 | End: 2019-07-07 | Stop reason: HOSPADM

## 2019-07-06 RX ORDER — 0.9 % SODIUM CHLORIDE 0.9 %
1000 INTRAVENOUS SOLUTION INTRAVENOUS ONCE
Status: COMPLETED | OUTPATIENT
Start: 2019-07-06 | End: 2019-07-06

## 2019-07-06 RX ORDER — KETOROLAC TROMETHAMINE 15 MG/ML
30 INJECTION, SOLUTION INTRAMUSCULAR; INTRAVENOUS ONCE
Status: COMPLETED | OUTPATIENT
Start: 2019-07-06 | End: 2019-07-06

## 2019-07-06 RX ORDER — PROCHLORPERAZINE EDISYLATE 5 MG/ML
10 INJECTION INTRAMUSCULAR; INTRAVENOUS ONCE
Status: COMPLETED | OUTPATIENT
Start: 2019-07-06 | End: 2019-07-06

## 2019-07-06 RX ADMIN — Medication 10 ML: at 08:11

## 2019-07-06 RX ADMIN — PREGABALIN 100 MG: 100 CAPSULE ORAL at 14:09

## 2019-07-06 RX ADMIN — VANCOMYCIN HYDROCHLORIDE 1500 MG: 10 INJECTION, POWDER, LYOPHILIZED, FOR SOLUTION INTRAVENOUS at 16:37

## 2019-07-06 RX ADMIN — ACETAMINOPHEN 650 MG: 325 TABLET ORAL at 14:57

## 2019-07-06 RX ADMIN — ACETAMINOPHEN 650 MG: 325 TABLET ORAL at 18:41

## 2019-07-06 RX ADMIN — SODIUM CHLORIDE 1000 ML: 9 INJECTION, SOLUTION INTRAVENOUS at 15:54

## 2019-07-06 RX ADMIN — ENOXAPARIN SODIUM 40 MG: 40 INJECTION SUBCUTANEOUS at 08:10

## 2019-07-06 RX ADMIN — DESMOPRESSIN ACETATE 40 MG: 0.2 TABLET ORAL at 21:40

## 2019-07-06 RX ADMIN — ASPIRIN 81 MG: 81 TABLET, CHEWABLE ORAL at 08:10

## 2019-07-06 RX ADMIN — DOXYCYCLINE HYCLATE 100 MG: 100 TABLET, COATED ORAL at 21:40

## 2019-07-06 RX ADMIN — CEFTRIAXONE SODIUM 1 G: 1 INJECTION, POWDER, FOR SOLUTION INTRAMUSCULAR; INTRAVENOUS at 12:39

## 2019-07-06 RX ADMIN — KETOROLAC TROMETHAMINE 30 MG: 15 INJECTION, SOLUTION INTRAMUSCULAR; INTRAVENOUS at 10:00

## 2019-07-06 RX ADMIN — PREGABALIN 100 MG: 100 CAPSULE ORAL at 08:10

## 2019-07-06 RX ADMIN — PIPERACILLIN AND TAZOBACTAM 3.38 G: 3; .375 INJECTION, POWDER, LYOPHILIZED, FOR SOLUTION INTRAVENOUS; PARENTERAL at 16:37

## 2019-07-06 RX ADMIN — PROCHLORPERAZINE EDISYLATE 10 MG: 5 INJECTION INTRAMUSCULAR; INTRAVENOUS at 10:00

## 2019-07-06 RX ADMIN — BUPROPION HYDROCHLORIDE 150 MG: 150 TABLET, FILM COATED, EXTENDED RELEASE ORAL at 08:10

## 2019-07-06 RX ADMIN — LORAZEPAM 0.5 MG: 0.5 TABLET ORAL at 21:40

## 2019-07-06 RX ADMIN — BUTALBITAL, ACETAMINOPHEN, AND CAFFEINE 1 TABLET: 50; 325; 40 TABLET ORAL at 06:17

## 2019-07-06 RX ADMIN — BUTALBITAL, ACETAMINOPHEN, AND CAFFEINE 1 TABLET: 50; 325; 40 TABLET ORAL at 18:41

## 2019-07-06 RX ADMIN — SODIUM CHLORIDE: 9 INJECTION, SOLUTION INTRAVENOUS at 09:52

## 2019-07-06 RX ADMIN — AZITHROMYCIN MONOHYDRATE 500 MG: 500 INJECTION, POWDER, LYOPHILIZED, FOR SOLUTION INTRAVENOUS at 13:48

## 2019-07-06 RX ADMIN — PREGABALIN 100 MG: 100 CAPSULE ORAL at 21:40

## 2019-07-06 ASSESSMENT — PAIN SCALES - GENERAL
PAINLEVEL_OUTOF10: 0
PAINLEVEL_OUTOF10: 5
PAINLEVEL_OUTOF10: 0
PAINLEVEL_OUTOF10: 6
PAINLEVEL_OUTOF10: 7

## 2019-07-06 ASSESSMENT — ENCOUNTER SYMPTOMS
SORE THROAT: 0
COUGH: 0
COLOR CHANGE: 1
EYE DISCHARGE: 0
WHEEZING: 0
SINUS PRESSURE: 0
CONSTIPATION: 0
SHORTNESS OF BREATH: 0
BLOOD IN STOOL: 0
ABDOMINAL PAIN: 0
DIARRHEA: 0
APNEA: 0
NAUSEA: 0
VOICE CHANGE: 0
VOMITING: 0

## 2019-07-06 NOTE — PROGRESS NOTES
°C) Oral 128 (!) 31 --   07/06/19 1245 -- -- -- 96 20 96 %       Physical Exam   Constitutional: He is oriented to person, place, and time. He appears well-developed and well-nourished. HENT:   Head: Normocephalic and atraumatic. Eyes: Conjunctivae are normal. No scleral icterus. Neck: Neck supple. No tracheal deviation present. Cardiovascular: Normal rate and normal heart sounds. Exam reveals no friction rub. No murmur heard. Pulse 108 regular   Pulmonary/Chest: No stridor. No respiratory distress. He has no wheezes. Abdominal: Soft. He exhibits no distension. There is no guarding. Genitourinary:   Genitourinary Comments: No millard - urine clear dark   Musculoskeletal: He exhibits no edema or deformity. Neurological: He is alert and oriented to person, place, and time. No cranial nerve deficit. Skin: Skin is warm and dry. There is erythema. Both lower arms are red from periph iv phlebitis   Psychiatric: He has a normal mood and affect. His behavior is normal.         Medical Decision Making:   I have independently reviewed/ordered the following labs:    CBC with Differential:   Recent Labs     07/05/19 2034   WBC 11.5*   HGB 15.6   HCT 45.4        BMP:  Recent Labs     07/05/19 2034      K 4.2      CO2 19*   BUN 15   CREATININE 0.94     Hepatic Function Panel: No results for input(s): PROT, LABALBU, BILIDIR, IBILI, BILITOT, ALKPHOS, ALT, AST in the last 72 hours. No results for input(s): RPR in the last 72 hours. No results for input(s): HIV in the last 72 hours. No results for input(s): BC in the last 72 hours. Lab Results   Component Value Date    CREATININE 0.94 07/05/2019    GLUCOSE 153 07/05/2019       Detailed results: Thank you for allowing us to participate in the care of this patient. Please call with questions.     This note is created with the assistance of a speech recognition program.  While intending to generate adocument that actually reflects the

## 2019-07-06 NOTE — PROGRESS NOTES
HENT:   Head: Normocephalic and atraumatic. Mouth/Throat: Oropharynx is clear and moist. No oropharyngeal exudate. Eyes: Pupils are equal, round, and reactive to light. Conjunctivae and EOM are normal. No scleral icterus. Neck: No JVD present. No thyromegaly present. Cardiovascular: Regular rhythm and normal heart sounds. Tachycardia present. No murmur heard. Pulmonary/Chest: Effort normal and breath sounds normal. He has no wheezes. He has no rales. Abdominal: Soft. He exhibits no mass. There is no tenderness. Lymphadenopathy:     He has no cervical adenopathy. Neurological: He is alert and oriented to person, place, and time. Skin: He is not diaphoretic. Nursing note and vitals reviewed. Lower Extremities : No ankle Edema , No calf Tenderness     Laboratory findings:    Recent Labs     07/05/19 2034   WBC 11.5*   HGB 15.6   HCT 45.4        Recent Labs     07/05/19 2034      K 4.2      CO2 19*   GLUCOSE 153*   BUN 15   CREATININE 0.94   CALCIUM 8.6     No results for input(s): PROT, LABALBU, LABA1C, X6FXVVK, F9HFOTI, FT4, TSH, AST, ALT, LDH, GGT, ALKPHOS, BILITOT, BILIDIR, AMMONIA, AMYLASE, LIPASE, LACTATE, CHOL, HDL, LDLCHOLESTEROL, CHOLHDLRATIO, TRIG, VLDL, BNP, TROPONINI, CKTOTAL, CKMB, CKMBINDEX, RF, ANGIE in the last 72 hours. Specific Gravity, UA   Date Value Ref Range Status   07/03/2019 1.010 1.005 - 1.030 Final     Protein, UA   Date Value Ref Range Status   07/03/2019 NEGATIVE NEGATIVE Final     Nitrite, Urine   Date Value Ref Range Status   07/03/2019 NEGATIVE NEGATIVE Final     Leukocyte Esterase, Urine   Date Value Ref Range Status   07/03/2019 NEGATIVE NEGATIVE Final       Imaging / Clinical Data :-   XRChest Portable    Result Date: 7/2/2019  No acute cardiopulmonary process. Clinical Course : stable  Assessment and Plan  :        1. Symptomatic bradycardia - HR is  > 60 , continue to monitor . Stress test negative  , echo shows preserved EF. Patient having tachycardia now . Follow cardiology recommendations. 2. Episode of confusion -  Negative  CT head, EEG negative for seizures. MRI brain negative for stroke . 3. H/O SVT s/p Abation in 2015 Dr Mindy Mendez. 4. Fever - unknown Cause. Follow Blood culture. Chest Xray negative . Empiric Rocephin Azithromycin . Monitor overnight due to high fever. Discussed with Dr Chanda Street . Continue to monitor vitals , Intake / output ,  Cell count , HGB , Kidney function, oxygenation  as indicated . Plan and updates discussed with patient ,  answers  explained to satisfaction.      Plan discussed with Staff Ashwini Salgado RN     (Please note that portions of this note were completed with a voice recognition program. Efforts were made to edit the dictations but occasionally words are mis-transcribed.)      Camilla Bender MD  7/6/2019

## 2019-07-07 VITALS
TEMPERATURE: 99.2 F | DIASTOLIC BLOOD PRESSURE: 69 MMHG | OXYGEN SATURATION: 95 % | BODY MASS INDEX: 25.47 KG/M2 | HEART RATE: 75 BPM | SYSTOLIC BLOOD PRESSURE: 113 MMHG | HEIGHT: 76 IN | WEIGHT: 209.2 LBS | RESPIRATION RATE: 22 BRPM

## 2019-07-07 LAB
HCT VFR BLD CALC: 42.2 % (ref 40.7–50.3)
HEMOGLOBIN: 14.1 G/DL (ref 13–17)
MCH RBC QN AUTO: 31.8 PG (ref 25.2–33.5)
MCHC RBC AUTO-ENTMCNC: 33.4 G/DL (ref 28.4–34.8)
MCV RBC AUTO: 95 FL (ref 82.6–102.9)
NRBC AUTOMATED: 0 PER 100 WBC
PDW BLD-RTO: 12.4 % (ref 11.8–14.4)
PLATELET # BLD: 126 K/UL (ref 138–453)
PMV BLD AUTO: 10.4 FL (ref 8.1–13.5)
RBC # BLD: 4.44 M/UL (ref 4.21–5.77)
WBC # BLD: 3.6 K/UL (ref 3.5–11.3)

## 2019-07-07 PROCEDURE — 6370000000 HC RX 637 (ALT 250 FOR IP): Performed by: NURSE PRACTITIONER

## 2019-07-07 PROCEDURE — 85027 COMPLETE CBC AUTOMATED: CPT

## 2019-07-07 PROCEDURE — 36415 COLL VENOUS BLD VENIPUNCTURE: CPT

## 2019-07-07 PROCEDURE — 99232 SBSQ HOSP IP/OBS MODERATE 35: CPT | Performed by: PSYCHIATRY & NEUROLOGY

## 2019-07-07 PROCEDURE — 94761 N-INVAS EAR/PLS OXIMETRY MLT: CPT

## 2019-07-07 PROCEDURE — 6360000002 HC RX W HCPCS: Performed by: NURSE PRACTITIONER

## 2019-07-07 PROCEDURE — 99232 SBSQ HOSP IP/OBS MODERATE 35: CPT | Performed by: FAMILY MEDICINE

## 2019-07-07 PROCEDURE — 6370000000 HC RX 637 (ALT 250 FOR IP): Performed by: INTERNAL MEDICINE

## 2019-07-07 RX ORDER — TOPIRAMATE 50 MG/1
50 TABLET, FILM COATED ORAL 2 TIMES DAILY
Qty: 60 TABLET | Refills: 5 | Status: SHIPPED | OUTPATIENT
Start: 2019-07-07

## 2019-07-07 RX ORDER — IBUPROFEN 600 MG/1
600 TABLET ORAL EVERY 8 HOURS PRN
Status: DISCONTINUED | OUTPATIENT
Start: 2019-07-07 | End: 2019-07-07 | Stop reason: HOSPADM

## 2019-07-07 RX ORDER — DOXYCYCLINE HYCLATE 100 MG
100 TABLET ORAL EVERY 12 HOURS SCHEDULED
Qty: 20 TABLET | Refills: 0 | Status: SHIPPED | OUTPATIENT
Start: 2019-07-07 | End: 2019-07-17

## 2019-07-07 RX ORDER — TOPIRAMATE 25 MG/1
50 TABLET ORAL 2 TIMES DAILY
Status: DISCONTINUED | OUTPATIENT
Start: 2019-07-07 | End: 2019-07-07 | Stop reason: HOSPADM

## 2019-07-07 RX ORDER — AMITRIPTYLINE HYDROCHLORIDE 25 MG/1
25 TABLET, FILM COATED ORAL NIGHTLY
Qty: 90 TABLET | Refills: 2 | Status: SHIPPED | OUTPATIENT
Start: 2019-07-07

## 2019-07-07 RX ADMIN — PREGABALIN 100 MG: 100 CAPSULE ORAL at 15:40

## 2019-07-07 RX ADMIN — DOXYCYCLINE HYCLATE 100 MG: 100 TABLET, COATED ORAL at 10:11

## 2019-07-07 RX ADMIN — ENOXAPARIN SODIUM 40 MG: 40 INJECTION SUBCUTANEOUS at 10:12

## 2019-07-07 RX ADMIN — BUPROPION HYDROCHLORIDE 150 MG: 150 TABLET, FILM COATED, EXTENDED RELEASE ORAL at 10:11

## 2019-07-07 RX ADMIN — PREGABALIN 100 MG: 100 CAPSULE ORAL at 10:11

## 2019-07-07 RX ADMIN — ACETAMINOPHEN 650 MG: 325 TABLET ORAL at 07:11

## 2019-07-07 RX ADMIN — ASPIRIN 81 MG: 81 TABLET, CHEWABLE ORAL at 10:11

## 2019-07-07 ASSESSMENT — ENCOUNTER SYMPTOMS
BLOOD IN STOOL: 0
ABDOMINAL PAIN: 0
VOICE CHANGE: 0
VOMITING: 0
SINUS PRESSURE: 0
COUGH: 0
DIARRHEA: 0
SORE THROAT: 0
NAUSEA: 0
SHORTNESS OF BREATH: 0
CONSTIPATION: 0
WHEEZING: 0

## 2019-07-07 ASSESSMENT — PAIN DESCRIPTION - PAIN TYPE: TYPE: ACUTE PAIN

## 2019-07-07 ASSESSMENT — PAIN DESCRIPTION - LOCATION: LOCATION: HEAD

## 2019-07-07 ASSESSMENT — PAIN SCALES - GENERAL: PAINLEVEL_OUTOF10: 3

## 2019-07-07 NOTE — FLOWSHEET NOTE
07/07/19 1712   Vitals   Temp 99.2 °F (37.3 °C)   Temp Source Oral   Ok for discharge per Dr. Josefina Dutton

## 2019-07-07 NOTE — PLAN OF CARE
Problem: Falls - Risk of:  Goal: Will remain free from falls  Description  Will remain free from falls  7/7/2019 0355 by Willie Aviles RN  Outcome: Ongoing  Note:   Bed in lowest position  Call light in reach  Bedside table in reach  Will continue to monitor  7/6/2019 1405 by Chevy Guzman RN  Outcome: Met This Shift

## 2019-07-07 NOTE — DISCHARGE SUMMARY
[facility]      Discharge Summary     Patient ID: Krysta Moreno  :  1965   MRN: [id number]     ACCOUNT:  [de-identified]   Patient Location :   Patient's PCP: Lynsey Roca DO  Admit Date: 2019   Discharge Date:  19     Length of Stay: 5  Code Status:  Full Code  Admitting Physician: Chante Gibbons MD  Discharge Physician: Chante Gibbons MD     Active Discharge Diagnosis :     Primary Problem  Symptomatic bradycardia      [place] Problems    Diagnosis Date Noted    SIRS (systemic inflammatory response syndrome) (HCC) [R65.10]     Phlebitis of superficial vein of upper extremity [I80.8]     Cellulitis of left arm [L03.114]     Cellulitis of right arm [R36.079]     Fever chills [R50.9]     Confusion [R41.0]     S/P ablation operation for arrhythmia [Z98.890, Z86.79]     Retrograde amnesia [R41.2]     Symptomatic bradycardia [R00.1] 2019       Admission Condition:  fair     Discharged Condition: stable    Hospital Stay:     Hospital Course:  Krysta Moreno is a 48 y.o. male who was admitted for the management of   Symptomatic bradycardia , presented to ER with Bradycardia (Pt reports HR dropping down into the 40s)  Patient came to Er with episode of dizziness and lightheadedness . He had another episode of confusion about 6 days ago that lasted for about 3 hrs while he was at work. He was with his girlfriend and has no recollection of the events .  He reported low heart rate for many months. Patient also reported shortness of breath on exertion. Patient had history of SVT s/p ablation in ×2. Initial evaluation showed heart rate of 57. Patient was monitored in the hospital and HR remained controlled. He continued to have headache with H/o migraine and was given symptomatic treatment. Neurology was consulted and patient had negative EEG and brain MRI.  Patient also underwent stress  Test and was negative for Portable    Result Date: 7/5/2019  No acute abnormality     Xr Chest Portable    Result Date: 7/2/2019  No acute cardiopulmonary process. Mri Brain Wo Contrast    Result Date: 7/5/2019  Unremarkable exam.  No evidence of acute ischemia     Nm Cardiac Stress Test Nuclear Imaging    Result Date: 7/5/2019  No evidence for stress-induced ischemia. Small moderate severity mostly fixed inferior wall perfusion defect which partly resolves on attenuation correction felt to be diaphragmatic artifact. No corresponding wall motion abnormality. Normal LVEF. Risk stratification: Low        Echo 7/2/19   Left ventricle is normal in size with normal systolic function. Calculated  ejection fraction is 60%. Mild mitral regurgitation. Mild tricuspid regurgitation. Estimated right ventricular systolic pressure is 25 mmHg. Consultations:    Consults:     Final Specialist Recommendations/Findings:   IP CONSULT TO INTERNAL MEDICINE  IP CONSULT TO HOSPITALIST  IP CONSULT TO CARDIOLOGY  IP CONSULT TO NEUROLOGY  IP CONSULT TO INFECTIOUS DISEASES      The patient was seen and examined on day of discharge and this discharge summary is in conjunction with any daily progress note from day of discharge. Discharge plan:     Disposition: Home    Physician Follow Up:     DO Sylvia Del Rio [address] [place] [id number]65 ALEJANDRO Villagran [address] [address]  [phone]    In 3 weeks  follow-up for hospitalization       Requiring Further Evaluation/Follow Up POST HOSPITALIZATION/Incidental Findings: follow up with PCP and cardiology . Diet: regular diet    Activity: As tolerated.     Instructions to Patient: medication per list     Discharge Medications:      Medication List      START taking these medications    amitriptyline 25 MG tablet  Commonly known as:  ELAVIL  Take 1 tablet by mouth nightly     doxycycline hyclate 100 MG tablet  Commonly known as:  VIBRA-TABS  Take 1 tablet by mouth every 12 hours for 10 days     topiramate 50 MG tablet  Commonly known as:  TOPAMAX  Take 1 tablet by mouth 2 times daily  Replaces:  topiramate 25 MG capsule        CONTINUE taking these medications    buPROPion 150 MG extended release tablet  Commonly known as:  WELLBUTRIN XL     eszopiclone 1 MG Tabs  Commonly known as:  LUNESTA     pregabalin 100 MG capsule  Commonly known as:  LYRICA     tiZANidine 4 MG tablet  Commonly known as:  Madison Steve        STOP taking these medications    topiramate 25 MG capsule  Commonly known as:  TOPAMAX SPRINKLE  Replaced by:  topiramate 50 MG tablet     traZODone 50 MG tablet  Commonly known as:  DESYREL           Where to Get Your Medications      These medications were sent to [facility], [id number]- [place], [address] George Prado [phone] - [address] [facility]. [place]pez, [address] [address]    Phone:  [phone]   · amitriptyline 25 MG tablet  · doxycycline hyclate 100 MG tablet  · topiramate 50 MG tablet         Time Spent on discharge is  32 mins in patient examination, evaluation, counseling as well as medication reconciliation, prescriptions for required medications, discharge plan and follow up. Electronically signed by   Irena Dobbins MD  7/7/2019        Thank you Dr. Jalyn Murphy DO for the opportunity to be involved in this patient's care.

## 2019-07-07 NOTE — PROGRESS NOTES
°F (37.5 °C) Oral 83 18 95 %   07/06/19 1907 (!) 112/53 101.3 °F (38.5 °C) Oral 105 25 94 %   07/06/19 1830 -- 100.4 °F (38 °C) Oral -- -- --   07/06/19 1445 107/62 102.9 °F (39.4 °C) Oral 128 (!) 31 --   07/06/19 1245 -- -- -- 96 20 96 %   07/06/19 0809 108/73 99.9 °F (37.7 °C) Oral 100 19 97 %     Intake / output   07/06 0701 - 07/07 0700  In: 1680 [P.O.:480; I.V.:1200]  Out: 1000 [Urine:1000]  Physical Exam:  Physical Exam   Constitutional: He is oriented to person, place, and time. No distress. HENT:   Head: Normocephalic and atraumatic. Mouth/Throat: Oropharynx is clear and moist. No oropharyngeal exudate. Eyes: Pupils are equal, round, and reactive to light. Conjunctivae and EOM are normal. No scleral icterus. Neck: No JVD present. No thyromegaly present. Cardiovascular: Regular rhythm and normal heart sounds. Tachycardia present. No murmur heard. Pulmonary/Chest: Effort normal and breath sounds normal. He has no wheezes. He has no rales. Abdominal: Soft. He exhibits no mass. There is no tenderness. Lymphadenopathy:     He has no cervical adenopathy. Neurological: He is alert and oriented to person, place, and time. Skin: He is not diaphoretic. Nursing note and vitals reviewed. Lower Extremities : No ankle Edema , No calf Tenderness     Laboratory findings:    Recent Labs     07/05/19 2034   WBC 11.5*   HGB 15.6   HCT 45.4        Recent Labs     07/05/19 2034      K 4.2      CO2 19*   GLUCOSE 153*   BUN 15   CREATININE 0.94   CALCIUM 8.6     No results for input(s): PROT, LABALBU, LABA1C, W3TXJPB, V3HPNLP, FT4, TSH, AST, ALT, LDH, GGT, ALKPHOS, BILITOT, BILIDIR, AMMONIA, AMYLASE, LIPASE, LACTATE, CHOL, HDL, LDLCHOLESTEROL, CHOLHDLRATIO, TRIG, VLDL, BNP, TROPONINI, CKTOTAL, CKMB, CKMBINDEX, RF, ANGIE in the last 72 hours.        Specific Gravity, UA   Date Value Ref Range Status   07/06/2019 1.030 1.005 - 1.030 Final     Protein, UA   Date Value Ref Range Status

## 2019-07-08 LAB
EKG ATRIAL RATE: 122 BPM
EKG P AXIS: 40 DEGREES
EKG P-R INTERVAL: 174 MS
EKG Q-T INTERVAL: 312 MS
EKG QRS DURATION: 108 MS
EKG QTC CALCULATION (BAZETT): 444 MS
EKG R AXIS: 60 DEGREES
EKG T AXIS: 22 DEGREES
EKG VENTRICULAR RATE: 122 BPM

## 2019-07-08 PROCEDURE — 93010 ELECTROCARDIOGRAM REPORT: CPT | Performed by: INTERNAL MEDICINE

## 2019-07-10 LAB
WEST NILE IGG: 0.96 IV
WEST NILE IGM: 0 IV

## 2019-07-11 LAB
CULTURE: NORMAL
CULTURE: NORMAL
Lab: NORMAL
Lab: NORMAL
SPECIMEN DESCRIPTION: NORMAL
SPECIMEN DESCRIPTION: NORMAL

## 2019-08-06 ENCOUNTER — HOSPITAL ENCOUNTER (OUTPATIENT)
Dept: CARDIAC CATH/INVASIVE PROCEDURES | Age: 54
Discharge: HOME | End: 2019-08-06
Payer: COMMERCIAL

## 2019-08-06 VITALS
SYSTOLIC BLOOD PRESSURE: 127 MMHG | RESPIRATION RATE: 16 BRPM | DIASTOLIC BLOOD PRESSURE: 91 MMHG | HEART RATE: 70 BPM | BODY MASS INDEX: 25.21 KG/M2 | HEIGHT: 76 IN | TEMPERATURE: 96.9 F | OXYGEN SATURATION: 99 % | WEIGHT: 207 LBS

## 2019-08-06 LAB
GFR NON-AFRICAN AMERICAN: >60 ML/MIN
GFR SERPL CREATININE-BSD FRML MDRD: >60 ML/MIN
GFR SERPL CREATININE-BSD FRML MDRD: NORMAL ML/MIN/{1.73_M2}
GLUCOSE BLD-MCNC: 107 MG/DL (ref 74–100)
POC CHLORIDE: 111 MMOL/L (ref 98–107)
POC CREATININE: 1.15 MG/DL (ref 0.51–1.19)
POC HEMATOCRIT: 47 % (ref 41–53)
POC HEMOGLOBIN: 15.9 G/DL (ref 13.5–17.5)
POC POTASSIUM: 4.1 MMOL/L (ref 3.5–4.5)
POC SODIUM: 144 MMOL/L (ref 138–146)

## 2019-08-06 PROCEDURE — 7100000011 HC PHASE II RECOVERY - ADDTL 15 MIN

## 2019-08-06 PROCEDURE — 82435 ASSAY OF BLOOD CHLORIDE: CPT

## 2019-08-06 PROCEDURE — 85014 HEMATOCRIT: CPT

## 2019-08-06 PROCEDURE — 6360000002 HC RX W HCPCS

## 2019-08-06 PROCEDURE — 84132 ASSAY OF SERUM POTASSIUM: CPT

## 2019-08-06 PROCEDURE — 7100000010 HC PHASE II RECOVERY - FIRST 15 MIN

## 2019-08-06 PROCEDURE — 93623 PRGRMD STIMJ&PACG IV RX NFS: CPT | Performed by: INTERNAL MEDICINE

## 2019-08-06 PROCEDURE — 2500000003 HC RX 250 WO HCPCS

## 2019-08-06 PROCEDURE — 93654 COMPRE EP EVAL TX VT: CPT | Performed by: INTERNAL MEDICINE

## 2019-08-06 PROCEDURE — C1894 INTRO/SHEATH, NON-LASER: HCPCS

## 2019-08-06 PROCEDURE — 82947 ASSAY GLUCOSE BLOOD QUANT: CPT

## 2019-08-06 PROCEDURE — 2709999900 HC NON-CHARGEABLE SUPPLY

## 2019-08-06 PROCEDURE — 82565 ASSAY OF CREATININE: CPT

## 2019-08-06 PROCEDURE — C1730 CATH, EP, 19 OR FEW ELECT: HCPCS

## 2019-08-06 PROCEDURE — 84295 ASSAY OF SERUM SODIUM: CPT

## 2019-08-06 RX ORDER — SODIUM CHLORIDE 9 MG/ML
INJECTION, SOLUTION INTRAVENOUS CONTINUOUS
Status: DISCONTINUED | OUTPATIENT
Start: 2019-08-06 | End: 2019-08-07 | Stop reason: HOSPADM

## 2019-08-06 RX ORDER — ROPINIROLE 0.5 MG/1
0.5 TABLET, FILM COATED ORAL 3 TIMES DAILY
COMMUNITY

## 2019-08-06 RX ADMIN — SODIUM CHLORIDE: 9 INJECTION, SOLUTION INTRAVENOUS at 11:37

## 2019-08-06 NOTE — PROGRESS NOTES
Received post ablation procedure to [facility] room 11. Assessment obtained. Restrictions reviewed with patient. Post procedure pathway initiated. Right groin site soft , Pressure dry and intact. No hematoma noted. Family at side. Patient without complaints. Head of bed flat with right leg straight.

## 2019-08-07 ENCOUNTER — OFFICE VISIT (OUTPATIENT)
Dept: NEUROLOGY | Age: 54
End: 2019-08-07
Payer: COMMERCIAL

## 2019-08-07 VITALS — HEART RATE: 90 BPM | SYSTOLIC BLOOD PRESSURE: 121 MMHG | DIASTOLIC BLOOD PRESSURE: 87 MMHG

## 2019-08-07 DIAGNOSIS — M47.817 SPONDYLOSIS OF LUMBOSACRAL REGION, UNSPECIFIED SPINAL OSTEOARTHRITIS COMPLICATION STATUS: ICD-10-CM

## 2019-08-07 DIAGNOSIS — Z86.79 S/P ABLATION OPERATION FOR ARRHYTHMIA: ICD-10-CM

## 2019-08-07 DIAGNOSIS — G44.40 MEDICATION OVERUSE HEADACHE: ICD-10-CM

## 2019-08-07 DIAGNOSIS — Z98.890 S/P ABLATION OPERATION FOR ARRHYTHMIA: ICD-10-CM

## 2019-08-07 DIAGNOSIS — R00.1 SYMPTOMATIC BRADYCARDIA: ICD-10-CM

## 2019-08-07 DIAGNOSIS — R41.0 CONFUSION: ICD-10-CM

## 2019-08-07 DIAGNOSIS — G43.709 CHRONIC MIGRAINE WITHOUT AURA WITHOUT STATUS MIGRAINOSUS, NOT INTRACTABLE: Primary | ICD-10-CM

## 2019-08-07 PROCEDURE — 99215 OFFICE O/P EST HI 40 MIN: CPT | Performed by: STUDENT IN AN ORGANIZED HEALTH CARE EDUCATION/TRAINING PROGRAM

## 2019-08-07 RX ORDER — NAPROXEN SODIUM 550 MG/1
550 TABLET ORAL 2 TIMES DAILY PRN
Qty: 14 TABLET | Refills: 3 | Status: SHIPPED | OUTPATIENT
Start: 2019-08-07 | End: 2020-01-16

## 2019-08-07 RX ORDER — CALCIUM CARBONATE/VITAMIN D3 500-10/5ML
400 LIQUID (ML) ORAL DAILY
Qty: 30 CAPSULE | Refills: 1 | COMMUNITY
Start: 2019-08-07 | End: 2019-09-06

## 2019-08-07 RX ORDER — RIZATRIPTAN BENZOATE 10 MG/1
10 TABLET ORAL
Qty: 10 TABLET | Refills: 3 | Status: SHIPPED | OUTPATIENT
Start: 2019-08-07 | End: 2019-11-29 | Stop reason: SDUPTHER

## 2019-08-07 ASSESSMENT — ENCOUNTER SYMPTOMS
NAUSEA: 0
BACK PAIN: 1
VOMITING: 0
SHORTNESS OF BREATH: 0
PHOTOPHOBIA: 1
CONSTIPATION: 0
COUGH: 0
DIARRHEA: 0
EYE PAIN: 0
ABDOMINAL PAIN: 0

## 2019-08-07 NOTE — PROGRESS NOTES
5 years, patient endorses bifrontal headache, 6/10 in intensity, occurs 2-4 times in a week, gets worse headache 3-4 times in a month. Headaches are usually sudden in onset, last for around 4 to 6 hours. Describes pressure-like headache associated with photophobia and phonophobia, blurry vision, mood changes, neck pain and stiffness in the neck, dizziness and fatigue. He is not aware of any family history of migraine headaches as he was adopted in the family, denies any kidney stones. Denies motion sickness. He endorses sleep difficulty. Denies drinking any coffee. Currently he is taking Motrin 800 mg tablets 8 to 10 tablets in a week, Excedrin 7 to 8 tablets in a week. Patient is also on on amitriptyline 25 mg at bedtime, Topamax 50 mg twice a day,   Not taking Lyrica 100 mg 3 times a day,   Taking Zanaflex 4 mg every 8 hours  Patient takes Maxalt 10 mg as needed for severe headaches, endorses improvement in his headache with that.       PATIENT HISTORY:     Past Medical History:   Diagnosis Date    Arthritis     left knee, and bilateral hands    Asthma     Back pain     H/O cardiac radiofrequency ablation January and March 2015    Headache(784.0)     PVC (premature ventricular contraction)     SVT (supraventricular tachycardia) (HCC)         Past Surgical History:   Procedure Laterality Date    ABLATION OF DYSRHYTHMIC FOCUS  08/06/2016    pvc's /  DR Mortimer O'Brien ANKLE SURGERY Right     mole removed    CARDIAC SURGERY  1-    SVT ablation    CARPAL TUNNEL RELEASE Right 08/2018    CHOLECYSTECTOMY      KNEE ARTHROSCOPY Left     KNEE SURGERY Left     scraped arthritis off    ROTATOR CUFF REPAIR Left     ROTATOR CUFF REPAIR Left     TONSILLECTOMY      adnoids        Social History     Socioeconomic History    Marital status: Single     Spouse name: Not on file    Number of children: Not on file    Years of education: Not on file    Highest education level: Not on file   Occupational (TOPAMAX) 50 MG tablet Take 1 tablet by mouth 2 times daily 60 tablet 5    amitriptyline (ELAVIL) 25 MG tablet Take 1 tablet by mouth nightly 90 tablet 2    buPROPion (WELLBUTRIN XL) 150 MG XL tablet Take 150 mg by mouth every morning      pregabalin (LYRICA) 100 MG capsule Take 100 mg by mouth 3 times daily      eszopiclone (LUNESTA) 1 MG TABS Take 1 mg by mouth nightly as needed   0    tiZANidine (ZANAFLEX) 4 MG tablet Take 4 mg by mouth every 8 hours as needed (spasms)   0     No current facility-administered medications for this visit. Allergies  Allergies   Allergen Reactions    Seasonal Itching, Dermatitis and Rash        REVIEW OF SYSTEMS:     ROS  Review of Systems   Constitutional: Positive for fatigue. Negative for appetite change, chills, fever and unexpected weight change. HENT: Positive for tinnitus. Eyes: Positive for photophobia and visual disturbance. Negative for pain. Respiratory: Negative for cough and shortness of breath. Cardiovascular: Negative for chest pain and palpitations. Gastrointestinal: Negative for abdominal pain, constipation, diarrhea, nausea and vomiting. Endocrine: Negative for polydipsia and polyuria. Genitourinary: Negative for difficulty urinating, dysuria and hematuria. Musculoskeletal: Positive for arthralgias, back pain, gait problem, myalgias, neck pain and neck stiffness. Skin: Negative for pallor and rash. Neurological: Positive for dizziness, weakness and headaches. Negative for tremors, seizures, syncope, facial asymmetry, speech difficulty, light-headedness and numbness. Cognitive impairment. Psychiatric/Behavioral: Negative for behavioral problems and hallucinations. The patient is nervous/anxious. VITALS  /87 (Site: Right Upper Arm, Position: Sitting, Cuff Size: Medium Adult)   Pulse 90      PHYSICAL EXAMINATION:     Constitutional: Well developed, well nourished and in no acute distress.    Head:

## 2020-01-02 RX ORDER — RIZATRIPTAN BENZOATE 10 MG/1
TABLET ORAL
Qty: 6 TABLET | Refills: 1 | Status: SHIPPED | OUTPATIENT
Start: 2020-01-02

## 2020-01-16 RX ORDER — NAPROXEN SODIUM 550 MG/1
550 TABLET ORAL 2 TIMES DAILY PRN
Qty: 14 TABLET | Refills: 3 | Status: SHIPPED | OUTPATIENT
Start: 2020-01-16

## 2020-01-16 NOTE — TELEPHONE ENCOUNTER
E-scribe requesting refill for Naproxen Sod 550mg. Please review and e-scribe if applicable. Last Visit Date: 8/7/19    Next Visit Date:  No future appointments.

## 2021-04-06 ENCOUNTER — HOSPITAL ENCOUNTER (EMERGENCY)
Age: 56
Discharge: HOME | End: 2021-04-07
Attending: EMERGENCY MEDICINE
Payer: COMMERCIAL

## 2021-04-06 ENCOUNTER — APPOINTMENT (OUTPATIENT)
Dept: GENERAL RADIOLOGY | Age: 56
End: 2021-04-06
Payer: COMMERCIAL

## 2021-04-06 DIAGNOSIS — G44.209 ACUTE NON INTRACTABLE TENSION-TYPE HEADACHE: Primary | ICD-10-CM

## 2021-04-06 LAB
ABSOLUTE EOS #: 0.08 K/UL (ref 0–0.44)
ABSOLUTE IMMATURE GRANULOCYTE: 0.03 K/UL (ref 0–0.3)
ABSOLUTE LYMPH #: 2.31 K/UL (ref 1.1–3.7)
ABSOLUTE MONO #: 0.54 K/UL (ref 0.1–1.2)
ANION GAP SERPL CALCULATED.3IONS-SCNC: 10 MMOL/L (ref 9–17)
BASOPHILS # BLD: 1 % (ref 0–2)
BASOPHILS ABSOLUTE: 0.05 K/UL (ref 0–0.2)
BNP INTERPRETATION: NORMAL
BUN BLDV-MCNC: 16 MG/DL (ref 6–20)
BUN/CREAT BLD: ABNORMAL (ref 9–20)
CALCIUM SERPL-MCNC: 8.8 MG/DL (ref 8.6–10.4)
CHLORIDE BLD-SCNC: 107 MMOL/L (ref 98–107)
CO2: 20 MMOL/L (ref 20–31)
CREAT SERPL-MCNC: 0.95 MG/DL (ref 0.7–1.2)
DIFFERENTIAL TYPE: NORMAL
EOSINOPHILS RELATIVE PERCENT: 1 % (ref 1–4)
GFR AFRICAN AMERICAN: >60 ML/MIN
GFR NON-AFRICAN AMERICAN: >60 ML/MIN
GFR SERPL CREATININE-BSD FRML MDRD: ABNORMAL ML/MIN/{1.73_M2}
GFR SERPL CREATININE-BSD FRML MDRD: ABNORMAL ML/MIN/{1.73_M2}
GLUCOSE BLD-MCNC: 128 MG/DL (ref 70–99)
HCT VFR BLD CALC: 45.6 % (ref 40.7–50.3)
HEMOGLOBIN: 15.2 G/DL (ref 13–17)
IMMATURE GRANULOCYTES: 0 %
LYMPHOCYTES # BLD: 28 % (ref 24–43)
MCH RBC QN AUTO: 31.3 PG (ref 25.2–33.5)
MCHC RBC AUTO-ENTMCNC: 33.3 G/DL (ref 28.4–34.8)
MCV RBC AUTO: 94 FL (ref 82.6–102.9)
MONOCYTES # BLD: 7 % (ref 3–12)
NRBC AUTOMATED: 0 PER 100 WBC
PDW BLD-RTO: 12.4 % (ref 11.8–14.4)
PLATELET # BLD: 241 K/UL (ref 138–453)
PLATELET ESTIMATE: NORMAL
PMV BLD AUTO: 10.2 FL (ref 8.1–13.5)
POTASSIUM SERPL-SCNC: 4 MMOL/L (ref 3.7–5.3)
PRO-BNP: 46 PG/ML
RBC # BLD: 4.85 M/UL (ref 4.21–5.77)
RBC # BLD: NORMAL 10*6/UL
SEG NEUTROPHILS: 63 % (ref 36–65)
SEGMENTED NEUTROPHILS ABSOLUTE COUNT: 5.17 K/UL (ref 1.5–8.1)
SODIUM BLD-SCNC: 137 MMOL/L (ref 135–144)
TROPONIN INTERP: NORMAL
TROPONIN T: NORMAL NG/ML
TROPONIN, HIGH SENSITIVITY: <6 NG/L (ref 0–22)
WBC # BLD: 8.2 K/UL (ref 3.5–11.3)
WBC # BLD: NORMAL 10*3/UL

## 2021-04-06 PROCEDURE — 83880 ASSAY OF NATRIURETIC PEPTIDE: CPT

## 2021-04-06 PROCEDURE — 96374 THER/PROPH/DIAG INJ IV PUSH: CPT

## 2021-04-06 PROCEDURE — 6360000002 HC RX W HCPCS: Performed by: EMERGENCY MEDICINE

## 2021-04-06 PROCEDURE — 85025 COMPLETE CBC W/AUTO DIFF WBC: CPT

## 2021-04-06 PROCEDURE — 71046 X-RAY EXAM CHEST 2 VIEWS: CPT

## 2021-04-06 PROCEDURE — 93005 ELECTROCARDIOGRAM TRACING: CPT | Performed by: EMERGENCY MEDICINE

## 2021-04-06 PROCEDURE — 84484 ASSAY OF TROPONIN QUANT: CPT

## 2021-04-06 PROCEDURE — 99285 EMERGENCY DEPT VISIT HI MDM: CPT

## 2021-04-06 PROCEDURE — 6370000000 HC RX 637 (ALT 250 FOR IP): Performed by: EMERGENCY MEDICINE

## 2021-04-06 PROCEDURE — 80048 BASIC METABOLIC PNL TOTAL CA: CPT

## 2021-04-06 RX ORDER — ALBUTEROL SULFATE 2.5 MG/3ML
2.5 SOLUTION RESPIRATORY (INHALATION) ONCE
Status: DISCONTINUED | OUTPATIENT
Start: 2021-04-06 | End: 2021-04-07 | Stop reason: HOSPADM

## 2021-04-06 RX ORDER — ACETAMINOPHEN 500 MG
1000 TABLET ORAL 3 TIMES DAILY
Qty: 60 TABLET | Refills: 0 | Status: ON HOLD | OUTPATIENT
Start: 2021-04-06 | End: 2022-09-27 | Stop reason: ALTCHOICE

## 2021-04-06 RX ORDER — IBUPROFEN 400 MG/1
400 TABLET ORAL ONCE
Status: COMPLETED | OUTPATIENT
Start: 2021-04-06 | End: 2021-04-06

## 2021-04-06 RX ORDER — PROCHLORPERAZINE EDISYLATE 5 MG/ML
10 INJECTION INTRAMUSCULAR; INTRAVENOUS ONCE
Status: COMPLETED | OUTPATIENT
Start: 2021-04-06 | End: 2021-04-06

## 2021-04-06 RX ORDER — ACETAMINOPHEN 500 MG
1000 TABLET ORAL ONCE
Status: COMPLETED | OUTPATIENT
Start: 2021-04-06 | End: 2021-04-06

## 2021-04-06 RX ADMIN — IBUPROFEN 400 MG: 400 TABLET, FILM COATED ORAL at 22:16

## 2021-04-06 RX ADMIN — PROCHLORPERAZINE EDISYLATE 10 MG: 5 INJECTION INTRAMUSCULAR; INTRAVENOUS at 22:17

## 2021-04-06 RX ADMIN — ACETAMINOPHEN 1000 MG: 500 TABLET ORAL at 22:16

## 2021-04-06 ASSESSMENT — PAIN SCALES - GENERAL: PAINLEVEL_OUTOF10: 6

## 2021-04-06 ASSESSMENT — ENCOUNTER SYMPTOMS
ABDOMINAL PAIN: 0
DIARRHEA: 0
VOMITING: 0
SHORTNESS OF BREATH: 1
NAUSEA: 0
CONSTIPATION: 0
SORE THROAT: 0

## 2021-04-06 ASSESSMENT — PAIN DESCRIPTION - DESCRIPTORS: DESCRIPTORS: PRESSURE

## 2021-04-06 ASSESSMENT — PAIN DESCRIPTION - FREQUENCY: FREQUENCY: CONTINUOUS

## 2021-04-06 ASSESSMENT — PAIN DESCRIPTION - ONSET: ONSET: ON-GOING

## 2021-04-06 ASSESSMENT — PAIN DESCRIPTION - PROGRESSION: CLINICAL_PROGRESSION: GRADUALLY WORSENING

## 2021-04-07 VITALS
RESPIRATION RATE: 14 BRPM | HEART RATE: 53 BPM | DIASTOLIC BLOOD PRESSURE: 77 MMHG | SYSTOLIC BLOOD PRESSURE: 103 MMHG | OXYGEN SATURATION: 95 % | TEMPERATURE: 97.9 F

## 2021-04-07 LAB
EKG ATRIAL RATE: 68 BPM
EKG P AXIS: 22 DEGREES
EKG P-R INTERVAL: 190 MS
EKG Q-T INTERVAL: 412 MS
EKG QRS DURATION: 118 MS
EKG QTC CALCULATION (BAZETT): 438 MS
EKG R AXIS: 33 DEGREES
EKG T AXIS: 22 DEGREES
EKG VENTRICULAR RATE: 68 BPM

## 2021-04-07 PROCEDURE — 93010 ELECTROCARDIOGRAM REPORT: CPT | Performed by: INTERNAL MEDICINE

## 2021-04-07 NOTE — ED PROVIDER NOTES
[address] [facility]     Emergency Department     Faculty Attestation    I performed a history and physical examination of the patient and discussed management with the resident. I reviewed the residents note and agree with the documented findings and plan of care. Any areas of disagreement are noted on the chart. I was personally present for the key portions of any procedures. I have documented in the chart those procedures where I was not present during the key portions. I have reviewed the emergency nurses triage note. I agree with the chief complaint, past medical history, past surgical history, allergies, medications, social and family history as documented unless otherwise noted below. For Physician Assistant/ Nurse Practitioner cases/documentation I have personally evaluated this patient and have completed at least one if not all key elements of the E/M (history, physical exam, and MDM). Additional findings are as noted. I have personally seen and evaluated the patient. I find the patient's history and physical exam are consistent with the NP/PA documentation. I agree with the care provided, treatment rendered, disposition and follow-up plan. 46-year-old male with a history of hypertension, recently started on carvedilol, migraine headaches, arrhythmias requiring ablation presenting with headache and hypertension. States that his headache is similar to migraines he has had in the past.  He took his blood pressure today, and it was elevated, and he took 2 doses of carvedilol as instructed by his PCP. His headache continues, but his blood pressure has improved. He denies any vision changes or numbness/tingling. Exam:  General: Laying on the bed, awake, alert and in no acute distress  CV: normal rate and regular rhythm  Lungs: Breathing comfortably on room air with no tachypnea, hypoxia, or increased work of breathing  Neuro: Awake, alert, moving all extremities.   Normal speech. No facial droop. Plan:  Labs including CBC, electrolytes, troponin to rule out hypertensive urgency/emergency  Symptomatic management of migraine headache.   No immediate indication for cross-sectional neuroimaging with no neurologic deficits, gradual onset headache similar to those he has had in the past.        Hay Christopher MD   Attending Emergency  Physician    (Please note that portions of this note were completed with a voice recognition program. Efforts were made to edit the dictations but occasionally words are mis-transcribed.)              Hay Christopher MD  04/07/21 6752

## 2021-04-07 NOTE — ED NOTES
Pt. Back from xray. Pt. Resting on stretcher in room 30. Call light within reach and bed in lowest position. Will continue to monitor.       Ayse Padilla RN  04/06/21 [id number]

## 2021-04-07 NOTE — ED TRIAGE NOTES
Pt. Presents to the ED with c/o simone. Pt. States he took his blood pressure when he was at home and his blood pressure was in the 200/100. Pt. Denies HTN. Pt. Does states several cardiac ablations. Pt. Does state he got SOB when this occurred. Pt. Does state a headache since this occurred. Pt. Denies any other complains at this time. Pt resting on stretcher, no respiratory distress noted, pt updated on plan of care, will continue to monitor, call light in reach.

## 2021-04-07 NOTE — ED PROVIDER NOTES
[address] [facility]  ED  Emergency Department Encounter  EmergencyMedicine Resident     Pt Name:Julien Carolina  MRN: [id number]  [place] 1965  Date of evaluation: 4/6/21  PCP:  Sonny Strong DO    CHIEF COMPLAINT       Chief Complaint   Patient presents with    Migraine       HISTORY OF PRESENT ILLNESS  (Location/Symptom, Timing/Onset, Context/Setting, Quality, Duration, Modifying Factors, Severity.)      Jarek Baird is a 49 y.o. male who presents to the emergency department with a 1 day history of severe hypertension associated with a migraine which patient states is not unusual for him. The hypertension was found on a home blood pressure cuff measured as about 230/100 and occurred at about 5 PM today after the patient first noticed a headache. He called his primary care doctor who started him on carvedilol. The patient took 1 dose at 6 PM and 1 dose at 7:15 PM per the PCPs instructions but his blood pressure was still running high so he presents now to the ED for evaluation. He had no chest pain at any point and denies that this is the worst headache of his life. He denies neck stiffness or unusual neck pain other than chronic, and further denies any recent sick symptoms, vision changes, HEENT symptoms, abdominal pain, nausea or vomiting, or problems with urination or bowel movements. No new numbness or tingling anywhere. No prior history of blood clots and the patient stopped smoking about 11 years ago. Last cardiac work-up was performed in 2019; patient has history of multiple ablations of his heart. PAST MEDICAL / SURGICAL / SOCIAL / FAMILY HISTORY      has a past medical history of Arthritis, Asthma, Back pain, H/O cardiac radiofrequency ablation, Headache(784.0), PVC (premature ventricular contraction), and SVT (supraventricular tachycardia) ([facility] [address] has a past surgical history that includes Cholecystectomy; Rotator cuff repair (Left); Knee arthroscopy (Left);  Ankle 4/6/21 4/16/21 Yes Scott De Paz MD   naproxen sodium (ANAPROX) 550 MG tablet TAKE 1 TABLET BY MOUTH 2 TIMES DAILY AS NEEDED FOR PAIN (HEADACHE) 1/16/20   King Kendall MD   rizatriptan (MAXALT) 10 MG tablet PLEASE SEE ATTACHED FOR DETAILED DIRECTIONS 1/2/20   King Kendall MD   rOPINIRole (REQUIP) 0.5 MG tablet Take 0.5 mg by mouth 3 times daily    Historical Provider, MD   topiramate (TOPAMAX) 50 MG tablet Take 1 tablet by mouth 2 times daily 7/7/19   Delfino Bryson MD   amitriptyline (ELAVIL) 25 MG tablet Take 1 tablet by mouth nightly 7/7/19   Delfino Bryson MD   buPROPion (WELLBUTRIN XL) 150 MG XL tablet Take 150 mg by mouth every morning    Historical Provider, MD   pregabalin (LYRICA) 100 MG capsule Take 100 mg by mouth 3 times daily    Historical Provider, MD   eszopiclone (LUNESTA) 1 MG TABS Take 1 mg by mouth nightly as needed  3/2/16   Historical Provider, MD   tiZANidine (ZANAFLEX) 4 MG tablet Take 4 mg by mouth every 8 hours as needed (spasms)  3/7/16   Historical Provider, MD       REVIEW OF SYSTEMS    (2-9 systems for level 4, 10 or more for level 5)      Review of Systems   Constitutional: Negative for chills and fever. HENT: Negative for ear pain, hearing loss and sore throat. Eyes: Negative for visual disturbance. Respiratory: Positive for shortness of breath. Cardiovascular: Negative for chest pain. Gastrointestinal: Negative for abdominal pain, constipation, diarrhea, nausea and vomiting. Genitourinary: Negative for difficulty urinating and dysuria. Musculoskeletal: Negative for arthralgias and myalgias. Neurological: Positive for headaches. Negative for weakness and numbness. Psychiatric/Behavioral: Negative for agitation and confusion. PHYSICAL EXAM   (up to 7 for level 4, 8 or more for level 5)      INITIAL VITALS:   /77   Pulse 53   Temp 97.9 °F (36.6 °C) (Oral)   Resp 14   SpO2 95%     Physical Exam  Vitals signs and nursing note reviewed.    Constitutional: General: He is not in acute distress. Appearance: Normal appearance. He is well-developed. He is not ill-appearing or diaphoretic. HENT:      Head: Normocephalic and atraumatic. Right Ear: Tympanic membrane, ear canal and external ear normal.      Left Ear: Tympanic membrane, ear canal and external ear normal.      Nose: Nose normal.      Mouth/Throat:      Mouth: Mucous membranes are moist.   Eyes:      Extraocular Movements: Extraocular movements intact. Conjunctiva/sclera: Conjunctivae normal.   Neck:      Musculoskeletal: Normal range of motion and neck supple. Trachea: No tracheal deviation. Cardiovascular:      Rate and Rhythm: Normal rate and regular rhythm. Heart sounds: Normal heart sounds. No murmur. No friction rub. No gallop. Pulmonary:      Effort: Pulmonary effort is normal. No respiratory distress. Breath sounds: Normal breath sounds. No wheezing, rhonchi or rales. Abdominal:      General: Abdomen is flat. There is no distension. Palpations: Abdomen is soft. There is no mass. Tenderness: There is no abdominal tenderness. There is no guarding or rebound. Musculoskeletal: Normal range of motion. General: No swelling, tenderness (Specifically no posterior calf tenderness to palpation bilaterally), deformity or signs of injury. Right lower leg: No edema. Left lower leg: No edema. Skin:     General: Skin is warm and dry. Capillary Refill: Capillary refill takes less than 2 seconds. Coloration: Skin is not jaundiced. Findings: No bruising or lesion. Neurological:      General: No focal deficit present. Mental Status: He is alert and oriented to person, place, and time. Mental status is at baseline. Cranial Nerves: No cranial nerve deficit. Sensory: No sensory deficit. Motor: No weakness or abnormal muscle tone.       Coordination: Coordination normal.      Comments: NIH stroke scale 0 DIFFERENTIAL  DIAGNOSIS     PLAN (LABS / IMAGING / EKG):  Orders Placed This Encounter   Procedures    XR CHEST (2 VW)    CBC Auto Differential    Basic Metabolic Panel w/ Reflex to MG    Brain Natriuretic Peptide    Troponin    Urinalysis Reflex to Culture    Troponin    EKG 12 Lead       MEDICATIONS ORDERED:  Orders Placed This Encounter   Medications    acetaminophen (TYLENOL) tablet 1,000 mg    albuterol (PROVENTIL) nebulizer solution 2.5 mg    ibuprofen (ADVIL;MOTRIN) tablet 400 mg    prochlorperazine (COMPAZINE) injection 10 mg    acetaminophen (TYLENOL) 500 MG tablet     Sig: Take 2 tablets by mouth 3 times daily for 10 days     Dispense:  60 tablet     Refill:  0       DDX: Hao Toussaint is a 49 y.o. male who presents to the emergency department with headache and shortness of breath.  Differential diagnosis includes migraine, hypertensive urgency, hypertensive emergency, ACS    DIAGNOSTIC RESULTS / EMERGENCY DEPARTMENT COURSE / MDM   LAB RESULTS:  Results for orders placed or performed during the hospital encounter of 04/06/21   CBC Auto Differential   Result Value Ref Range    WBC 8.2 3.5 - 11.3 k/uL    RBC 4.85 4.21 - 5.77 m/uL    Hemoglobin 15.2 13.0 - 17.0 g/dL    Hematocrit 45.6 40.7 - 50.3 %    MCV 94.0 82.6 - 102.9 fL    MCH 31.3 25.2 - 33.5 pg    MCHC 33.3 28.4 - 34.8 g/dL    RDW 12.4 11.8 - 14.4 %    Platelets 378 169 - 185 k/uL    MPV 10.2 8.1 - 13.5 fL    NRBC Automated 0.0 0.0 per 100 WBC    Differential Type NOT REPORTED     Seg Neutrophils 63 36 - 65 %    Lymphocytes 28 24 - 43 %    Monocytes 7 3 - 12 %    Eosinophils % 1 1 - 4 %    Basophils 1 0 - 2 %    Immature Granulocytes 0 0 %    Segs Absolute 5.17 1.50 - 8.10 k/uL    Absolute Lymph # 2.31 1.10 - 3.70 k/uL    Absolute Mono # 0.54 0.10 - 1.20 k/uL    Absolute Eos # 0.08 0.00 - 0.44 k/uL    Basophils Absolute 0.05 0.00 - 0.20 k/uL    Absolute Immature Granulocyte 0.03 0.00 - 0.30 k/uL    WBC Morphology NOT REPORTED RBC Morphology NOT REPORTED     Platelet Estimate NOT REPORTED    Basic Metabolic Panel w/ Reflex to MG   Result Value Ref Range    Glucose 128 (H) 70 - 99 mg/dL    BUN 16 6 - 20 mg/dL    CREATININE 0.95 0.70 - 1.20 mg/dL    Bun/Cre Ratio NOT REPORTED 9 - 20    Calcium 8.8 8.6 - 10.4 mg/dL    Sodium 137 135 - 144 mmol/L    Potassium 4.0 3.7 - 5.3 mmol/L    Chloride 107 98 - 107 mmol/L    CO2 20 20 - 31 mmol/L    Anion Gap 10 9 - 17 mmol/L    GFR Non-African American >60 >60 mL/min    GFR African American >60 >60 mL/min    GFR Comment          GFR Staging NOT REPORTED    Brain Natriuretic Peptide   Result Value Ref Range    Pro-BNP 46 <300 pg/mL    BNP Interpretation Pro-BNP Reference Range:    Troponin   Result Value Ref Range    Troponin, High Sensitivity <6 0 - 22 ng/L    Troponin T NOT REPORTED <0.03 ng/mL    Troponin Interp NOT REPORTED        IMPRESSION: Estephania Blevins is a 49 y.o. male who presents to the emergency department with headache and shortness of breath. On examination he is afebrile, vital signs unremarkable examination demonstrates an otherwise well-appearing male of stated age with normal heart and lung sounds, nontender abdomen, no posterior calf tenderness palpation and no leg swelling. Low suspicion for cardiac etiology the patient's symptoms given the normalized blood pressure, the fact that the headache is very similar to prior episodes of headache, and no objective signs of heart failure. We will obtain cardiac work-up, basic labs, check for endorgan damage, and if labs are normal likely discharge. RADIOLOGY:  No results found.     EKG  EKG Interpretation    Interpreted by emergency department physician    Rhythm: normal sinus   Rate: normal  Axis: normal  Ectopy: none  Conduction: Incomplete right bundle instructions unchanged from prior EKG  ST Segments: no acute change  T Waves: no acute change  Q Waves: none    Clinical Impression: no acute changes and nonspecific none EKG    Maria D [place] were made to edit the dictations but occasionally words are mis-transcribed.)        Jostin More MD  Resident  04/07/21 [id number]

## 2022-08-25 ENCOUNTER — OFFICE VISIT (OUTPATIENT)
Dept: UROLOGY | Age: 57
End: 2022-08-25
Payer: COMMERCIAL

## 2022-08-25 VITALS
SYSTOLIC BLOOD PRESSURE: 130 MMHG | DIASTOLIC BLOOD PRESSURE: 86 MMHG | WEIGHT: 235 LBS | HEART RATE: 61 BPM | OXYGEN SATURATION: 97 % | TEMPERATURE: 97 F | HEIGHT: 76 IN | BODY MASS INDEX: 28.62 KG/M2

## 2022-08-25 DIAGNOSIS — N13.2 URETERAL STONE WITH HYDRONEPHROSIS: Primary | ICD-10-CM

## 2022-08-25 DIAGNOSIS — R10.9 FLANK PAIN: ICD-10-CM

## 2022-08-25 PROCEDURE — 99204 OFFICE O/P NEW MOD 45 MIN: CPT | Performed by: UROLOGY

## 2022-08-25 RX ORDER — LISINOPRIL AND HYDROCHLOROTHIAZIDE 20; 12.5 MG/1; MG/1
1 TABLET ORAL DAILY
COMMUNITY
Start: 2022-03-04

## 2022-08-25 RX ORDER — CEPHALEXIN 500 MG/1
CAPSULE ORAL
COMMUNITY
Start: 2022-08-22 | End: 2022-09-22

## 2022-08-25 RX ORDER — TAMSULOSIN HYDROCHLORIDE 0.4 MG/1
CAPSULE ORAL
COMMUNITY
Start: 2022-08-09

## 2022-08-25 ASSESSMENT — ENCOUNTER SYMPTOMS
COUGH: 0
EYE PAIN: 0
RHINORRHEA: 0
WHEEZING: 0
SINUS PRESSURE: 0
SHORTNESS OF BREATH: 0
VOMITING: 0
BACK PAIN: 0
CONSTIPATION: 0
SINUS PAIN: 0
NAUSEA: 0
DIARRHEA: 0
EYE REDNESS: 0
ABDOMINAL PAIN: 1

## 2022-08-25 NOTE — PROGRESS NOTES
[address] [facility] [id number]  Dept: [address] [place] [address] Urology Office Note - New Patient    Patient:  Hannah Robins  YOB: 1965  Date: 8/25/2022    The patient is a 56 y.o. male who presentstoday for evaluation of the following problems:   Chief Complaint   Patient presents with    Nephrolithiasis     NP, complains of issues with kidney stones    referred by Laura Azevedo DO.    HPI  Here for new onset left flank pain. Found to have a 5mm left distal stone. No nausea, no vomiting, no fevers, urinating ok  Ct reviewed and showed left hydro and ureteral stone on left    (Patient's old records have been requested, reviewed and summarized in today's note.)    Summary of old records: N/A    History: N/A    ProceduresToday: N/A    Urinalysis today:  No results found for this visit on 08/25/22. AUA Symptom Score (8/25/2022):                                Last BUN andcreatinine:  Lab Results   Component Value Date    BUN 16 04/06/2021     Lab Results   Component Value Date    CREATININE 0.95 04/06/2021       Additional Lab/Culture results: none    Reviewed during this Office Visit: none  (results were independently reviewed byphysician and radiology report verified)    PAST MEDICAL, FAMILY AND SOCIAL HISTORY:  Past Medical History:   Diagnosis Date    Arthritis     left knee, and bilateral hands    Asthma     Back pain     H/O cardiac radiofrequency ablation January and March 2015    Headache(784.0)     PVC (premature ventricular contraction)     SVT (supraventricular tachycardia) ([address]      Past Surgical History:   Procedure Laterality Date    ABLATION OF DYSRHYTHMIC FOCUS  08/06/2016    pvc's /  DR Mendel Babe    ANKLE SURGERY Right     mole removed    CARDIAC SURGERY  1-    SVT ablation    CARPAL TUNNEL RELEASE Right 08/2018    CHOLECYSTECTOMY      KNEE ARTHROSCOPY Left     KNEE SURGERY Left     scraped arthritis off    ROTATOR CUFF REPAIR Left     ROTATOR CUFF REPAIR Left     TONSILLECTOMY      adnoids     No family history on file. Outpatient Medications Marked as Taking for the 22 encounter (Office Visit) with Amanda Wu MD   Medication Sig Dispense Refill    cephALEXin (KEFLEX) 500 MG capsule TAKE 1 CAPSULE BY MOUTH TWICE A DAY FOR 7 DAYS      lisinopril-hydroCHLOROthiazide (PRINZIDE;ZESTORETIC) 20-12.5 MG per tablet Take 1 tablet by mouth daily      tamsulosin (FLOMAX) 0.4 MG capsule TAKE 1 CAPSULE (0.4 MG TOTAL) BY MOUTH IN THE MORNING.      naproxen sodium (ANAPROX) 550 MG tablet TAKE 1 TABLET BY MOUTH 2 TIMES DAILY AS NEEDED FOR PAIN (HEADACHE) 14 tablet 3    rizatriptan (MAXALT) 10 MG tablet PLEASE SEE ATTACHED FOR DETAILED DIRECTIONS 6 tablet 1    rOPINIRole (REQUIP) 0.5 MG tablet Take 0.5 mg by mouth 3 times daily      topiramate (TOPAMAX) 50 MG tablet Take 1 tablet by mouth 2 times daily 60 tablet 5    amitriptyline (ELAVIL) 25 MG tablet Take 1 tablet by mouth nightly 90 tablet 2    pregabalin (LYRICA) 100 MG capsule Take 100 mg by mouth 3 times daily      eszopiclone (LUNESTA) 1 MG TABS Take 1 mg by mouth nightly as needed   0    tiZANidine (ZANAFLEX) 4 MG tablet Take 4 mg by mouth every 8 hours as needed (spasms)   0       Seasonal and Terbinafine  Social History     Tobacco Use   Smoking Status Former    Types: Cigarettes    Quit date: 2009    Years since quittin.1   Smokeless Tobacco Never   Tobacco Comments    quit 6 years ago      (If patient a smoker, smoking cessation counseling offered)   Social History     Substance and Sexual Activity   Alcohol Use Yes    Comment: rarely       REVIEW OF SYSTEMS:  Review of Systems    Physical Exam:    This a 56 y.o. female      Vitals:    22 0830   BP: 130/86   Pulse: 61   Temp: 97 °F (36.1 °C)   SpO2: 97%     Body mass index is 28.61 kg/m².   Physical Exam  Constitutional: Patient in no acute distress, ggod grooming, appropriately dressed  Neuro: Alert and oriented to person, place and time. Psych:Mood normal, affect normal  Skin: No rash noted  HEENT: Head: Normocephalic and atraumatic,Conjunctivae and EOM are normal,Nose- normal, Right/Left External Ear: normal, Mouth: Mucosa Moist  Neck: Supple  Lungs: Respiratory effort is normal  Cardiovascular: strong and regular, no lower leg edema  Abdomen: Soft, non-tender, non-distended with no CVA,            Assessment and Plan      1. Ureteral stone with hydronephrosis    2. Flank pain            Plan:    Trial of passage    Prescriptions Ordered:  No orders of the defined types were placed in this encounter. Orders Placed:  Orders Placed This Encounter   Procedures    US RENAL LIMITED     This procedure can be scheduled via [facility]. Access your [facility] account by visiting [email or web address]. Standing Status:   Future     Standing Expiration Date:   8/20/2023              Venkata Prado MD    Agree with the ROS entered by the MA.

## 2022-08-25 NOTE — PROGRESS NOTES
Review of Systems   Constitutional:  Positive for appetite change. Negative for chills and fatigue. HENT:  Positive for congestion. Negative for ear pain, rhinorrhea, sinus pressure and sinus pain. Eyes:  Negative for pain, redness and visual disturbance. Respiratory:  Negative for cough, shortness of breath and wheezing. Cardiovascular:  Negative for chest pain and leg swelling. Gastrointestinal:  Positive for abdominal pain. Negative for constipation, diarrhea, nausea and vomiting. Genitourinary:  Positive for difficulty urinating and frequency. Negative for dysuria, flank pain, hematuria and urgency. Musculoskeletal:  Negative for back pain, joint swelling and myalgias. Skin:  Negative for rash and wound. Neurological:  Positive for dizziness. Negative for weakness and numbness. Hematological:  Does not bruise/bleed easily.

## 2022-09-13 ENCOUNTER — HOSPITAL ENCOUNTER (OUTPATIENT)
Dept: ULTRASOUND IMAGING | Age: 57
Discharge: HOME | End: 2022-09-15
Payer: COMMERCIAL

## 2022-09-13 DIAGNOSIS — N13.2 URETERAL STONE WITH HYDRONEPHROSIS: ICD-10-CM

## 2022-09-13 PROCEDURE — 76775 US EXAM ABDO BACK WALL LIM: CPT

## 2022-09-14 ENCOUNTER — OFFICE VISIT (OUTPATIENT)
Dept: UROLOGY | Age: 57
End: 2022-09-14
Payer: COMMERCIAL

## 2022-09-14 VITALS
SYSTOLIC BLOOD PRESSURE: 122 MMHG | WEIGHT: 235 LBS | HEIGHT: 76 IN | TEMPERATURE: 98 F | DIASTOLIC BLOOD PRESSURE: 84 MMHG | BODY MASS INDEX: 28.62 KG/M2

## 2022-09-14 DIAGNOSIS — R10.9 LEFT FLANK PAIN: Primary | ICD-10-CM

## 2022-09-14 DIAGNOSIS — N20.0 BILATERAL KIDNEY STONES: ICD-10-CM

## 2022-09-14 PROCEDURE — 99213 OFFICE O/P EST LOW 20 MIN: CPT | Performed by: NURSE PRACTITIONER

## 2022-09-14 ASSESSMENT — ENCOUNTER SYMPTOMS
CONSTIPATION: 0
EYE REDNESS: 0
BACK PAIN: 0
SHORTNESS OF BREATH: 0
WHEEZING: 0
COUGH: 0
EYE PAIN: 0
VOMITING: 0
ABDOMINAL PAIN: 0
NAUSEA: 0
DIARRHEA: 0

## 2022-09-14 NOTE — PROGRESS NOTES
[address] [address]  Dept: [address] [facility] Urology Office Note - Established    Patient:  Bry Pugh  YOB: 1965  Date: 9/14/2022    The patient is a 56 y.o. male who presents todayfor evaluation of the following problems:   Chief Complaint   Patient presents with    Results     Renal US       HPI  Patient is presenting for f/u kidney stone. He was found to have a 5mm left ureteral stone with left hydro. Has been on stone passage trial. Renal US repeated and shows bilateral, non-obstructing stones and no hydro. Despite renal US, he continues to have terrible pain to left flank and radiating to left groin at times. Pain in office today is currently 3, but will be \"10\"/10 numerous times throughout the day. He is prefers not to take any thing for the pain. He continue tamsulosin. He continues straining urine each void and has not passed the stone. No gross hematuria, no n/v, no fever or chills. Summary of old records: N/A    Additional History: N/A    Procedures Today: N/A    Urinalysis today:  No results found for this visit on 09/14/22. Last several PSA's:  No results found for: PSA  Last total testosterone:  No results found for: TESTOSTERONE        Last BUN and creatinine:  Lab Results   Component Value Date    BUN 16 04/06/2021     Lab Results   Component Value Date    CREATININE 0.95 04/06/2021       Additional Lab/Culture results: none    Imaging Reviewed during this Office Visit:   Narrative   EXAMINATION:   ULTRASOUND OF THE KIDNEYS       9/13/2022 3:58 pm       COMPARISON:   None. HISTORY:   ORDERING SYSTEM PROVIDED HISTORY: Ureteral stone with hydronephrosis   TECHNOLOGIST PROVIDED HISTORY:   This procedure can be scheduled via [employer] [place]. Access your [facility] [place] account by   visiting [email or web address].        FINDINGS:   The right kidney measures 11.1 cm in length and the left kidney measures 11.6   cm in length. No hydronephrosis. Bilateral nonobstructing nephrolithiasis. Normal renal   cortical echogenicity. No soft tissue mass. Impression   Nonobstructing bilateral nephrolithiasis. Exam otherwise unremarkable. (results were independently reviewed by physician and radiology report verified)    PAST MEDICAL, FAMILY AND SOCIAL HISTORY UPDATE:  Past Medical History:   Diagnosis Date    Arthritis     left knee, and bilateral hands    Asthma     Back pain     H/O cardiac radiofrequency ablation January and March 2015    Headache(784.0)     PVC (premature ventricular contraction)     SVT (supraventricular tachycardia) ([facility] [address]      Past Surgical History:   Procedure Laterality Date    ABLATION OF DYSRHYTHMIC FOCUS  08/06/2016    pvc's /  DR Shafer Liadrian    ANKLE SURGERY Right     mole removed    CARDIAC SURGERY  1-    SVT ablation    CARPAL TUNNEL RELEASE Right 08/2018    CHOLECYSTECTOMY      KNEE ARTHROSCOPY Left     KNEE SURGERY Left     scraped arthritis off    ROTATOR CUFF REPAIR Left     ROTATOR CUFF REPAIR Left     TONSILLECTOMY      adnoids     No family history on file.   Outpatient Medications Marked as Taking for the 9/14/22 encounter (Office Visit) with ALLIE Herrera - CNP   Medication Sig Dispense Refill    cephALEXin (KEFLEX) 500 MG capsule TAKE 1 CAPSULE BY MOUTH TWICE A DAY FOR 7 DAYS      lisinopril-hydroCHLOROthiazide (PRINZIDE;ZESTORETIC) 20-12.5 MG per tablet Take 1 tablet by mouth daily      tamsulosin (FLOMAX) 0.4 MG capsule TAKE 1 CAPSULE (0.4 MG TOTAL) BY MOUTH IN THE MORNING.      naproxen sodium (ANAPROX) 550 MG tablet TAKE 1 TABLET BY MOUTH 2 TIMES DAILY AS NEEDED FOR PAIN (HEADACHE) 14 tablet 3    rizatriptan (MAXALT) 10 MG tablet PLEASE SEE ATTACHED FOR DETAILED DIRECTIONS 6 tablet 1    rOPINIRole (REQUIP) 0.5 MG tablet Take 0.5 mg by mouth 3 times daily      topiramate (TOPAMAX) 50 MG tablet Take 1 tablet by mouth 2 times daily 60 tablet 5    amitriptyline (ELAVIL) 25 MG tablet Take 1 tablet by mouth nightly 90 tablet 2    buPROPion (WELLBUTRIN XL) 150 MG XL tablet Take 150 mg by mouth every morning      pregabalin (LYRICA) 100 MG capsule Take 100 mg by mouth 3 times daily      eszopiclone (LUNESTA) 1 MG TABS Take 1 mg by mouth nightly as needed   0    tiZANidine (ZANAFLEX) 4 MG tablet Take 4 mg by mouth every 8 hours as needed (spasms)   0       Seasonal and Terbinafine  Social History     Tobacco Use   Smoking Status Former    Types: Cigarettes    Quit date: 2009    Years since quittin.1   Smokeless Tobacco Never   Tobacco Comments    quit 6 years ago     (Ifpatient a smoker, smoking cessation counseling offered)    Social History     Substance and Sexual Activity   Alcohol Use Yes    Comment: rarely       REVIEW OF SYSTEMS:  Review of Systems    Physical Exam:      Vitals:    22 0849   BP: 122/84   Temp: 98 °F (36.7 °C)     Body mass index is 28.61 kg/m². Patient is a 56 y.o. male in no acute distress and alert and oriented to person, place and time. Physical Exam  Constitutional: Patient in no acute distress. Neuro: Alert and oriented to person, place and time. Psych: Mood normal, affect normal  Skin: No rash noted  Lungs: Respiratory effort is normal  Cardiovascular: Warm & Pink  Abdomen: Soft, non-tender, non-distended with no CVA,  No flank tenderness on examination. Bladder non-tender and not distended. Musculoskeletal: Normal gait and station      Assessment and Plan      1. Left flank pain    2. Bilateral kidney stones           Plan:   Prior CT noted obstructing 5mm stone at left distal ureter w/ hydro. Renal US shows non-obstructing stones bilaterally and no hydro. He continues to have left flank and groin pain. Given persistent symptoms and that he has not passed stone, we will repeat CT. Patient will call office for his results and discuss plan of care from there.   If no hydro but still has pain, he may be interested in treating the non-obstructing stones. He is to continue flomax and straining urine. Return for Telephone call to review CT results. Prescriptions Ordered:  No orders of the defined types were placed in this encounter. Orders Placed:  Orders Placed This Encounter   Procedures    CT ABDOMEN PELVIS WO CONTRAST Additional Contrast? None     Standing Status:   Future     Standing Expiration Date:   9/14/2023     Order Specific Question:   Additional Contrast?     Answer:   None     Order Specific Question:   Reason for exam:     Answer:   hx 5mm L distal ureteral stone with hydro, pts renal US neg- has not passed stone, continues to have flank pain. Leopoldo Sills, APRN - CNP    Reviewed and agree with the ROS entered by the MA.

## 2022-09-20 ENCOUNTER — HOSPITAL ENCOUNTER (OUTPATIENT)
Dept: CT IMAGING | Facility: CLINIC | Age: 57
Discharge: HOME | End: 2022-09-22
Payer: COMMERCIAL

## 2022-09-20 DIAGNOSIS — R10.9 LEFT FLANK PAIN: ICD-10-CM

## 2022-09-20 DIAGNOSIS — N20.0 BILATERAL KIDNEY STONES: ICD-10-CM

## 2022-09-20 PROCEDURE — 74176 CT ABD & PELVIS W/O CONTRAST: CPT

## 2022-09-21 ENCOUNTER — TELEPHONE (OUTPATIENT)
Dept: UROLOGY | Age: 57
End: 2022-09-21

## 2022-09-21 ENCOUNTER — HOSPITAL ENCOUNTER (OUTPATIENT)
Age: 57
Setting detail: SPECIMEN
Discharge: HOME | End: 2022-09-21

## 2022-09-21 DIAGNOSIS — R30.0 DYSURIA: ICD-10-CM

## 2022-09-21 DIAGNOSIS — R30.0 DYSURIA: Primary | ICD-10-CM

## 2022-09-21 DIAGNOSIS — N20.0 KIDNEY STONE: ICD-10-CM

## 2022-09-21 NOTE — TELEPHONE ENCOUNTER
Cysto, (LT) URS, HLL, (LT) stent placement @ [facility] 9/27/22 1:30pm   PAT phone call 9/22/22 11:30am           Spoke with patient, procedure info emailed.

## 2022-09-21 NOTE — TELEPHONE ENCOUNTER
Called pt to discuss CT results. 7mm stone at left UVJ without hydro, 5mm non-obstructing stone in left kidney. Per Dr. Anya Yanez- would like to proceed with treating. Cysto, (L) URS, HLL and stent placement. Surgery d/w patient, risks of surgery discussed with patient. He is agreeable to proceed. Info to be forwarded to surgery schedulers.

## 2022-09-21 NOTE — TELEPHONE ENCOUNTER
Procedure time changed from 1:30pm to 11:30am for a 9:30am arrival... time changed confirmed benjamin/ Alisha @ [facility] #: [id number].     Patient notified of time change and verbalized an understanding and all questions were answered

## 2022-09-22 ENCOUNTER — HOSPITAL ENCOUNTER (OUTPATIENT)
Dept: PREADMISSION TESTING | Age: 57
Discharge: HOME | End: 2022-09-26

## 2022-09-22 VITALS — WEIGHT: 235 LBS | BODY MASS INDEX: 27.75 KG/M2 | HEIGHT: 77 IN

## 2022-09-22 LAB
CULTURE: NO GROWTH
SPECIMEN DESCRIPTION: NORMAL

## 2022-09-22 NOTE — PROGRESS NOTES
Pre-op Instructions For Out-Patient Surgery    Medication Instructions:  Please stop herbs and any supplements now (includes vitamins and minerals). Please contact your surgeon and prescribing physician for pre-op instructions for any blood thinners. If you have inhalers/aerosol treatments at home, please use them the morning of your surgery and bring the inhalers with you to the hospital.    Please take the following medications the morning of your surgery with a sip of water:    Ropinirole, Requip. Surgery Instructions:  After midnight before surgery:  Do not eat or drink anything, including water, mints, gum, and hard candy. You may brush your teeth without swallowing. No smoking, chewing tobacco, or street drugs. Please shower or bathe before surgery. If you were given Surgical Scrub Chlorhexidine Gluconate Liquid (CHG), please shower the night before and the morning of your surgery following the detailed instructions you received during your pre-admission visit. Please do not wear any cologne, lotion, powder, deodorant, jewelry, piercings, perfume, makeup, nail polish, hair accessories, or hair spray on the day of surgery. Wear loose comfortable clothing. Leave your valuables at home. Bring a storage case for any glasses/contacts. An adult who is responsible for you MUST drive you home and should be with you for the first 24 hours after surgery. If having out-patient knee and foot surgeries, please arrange for planned crutches, walker, or wheelchair before arriving to the hospital.    The Day of Surgery:  Arrive at [address] [place] [address] Surgery Entrance at the time directed by your surgeon and check in at the desk. If you have a living will or healthcare power of , please bring a copy. You will be taken to the pre-op holding area where you will be prepared for surgery.   A physical assessment will be performed by a nurse practitioner or

## 2022-09-23 ENCOUNTER — TELEPHONE (OUTPATIENT)
Dept: UROLOGY | Age: 57
End: 2022-09-23

## 2022-09-23 DIAGNOSIS — R10.9 LEFT FLANK PAIN: ICD-10-CM

## 2022-09-23 DIAGNOSIS — N20.0 KIDNEY STONE: Primary | ICD-10-CM

## 2022-09-23 NOTE — TELEPHONE ENCOUNTER
Patient called in and stated \"I was wondering if I could have pain meds called in until my surgery. \"

## 2022-09-26 ENCOUNTER — ANESTHESIA EVENT (OUTPATIENT)
Dept: OPERATING ROOM | Age: 57
End: 2022-09-26
Payer: COMMERCIAL

## 2022-09-26 RX ORDER — HYDROCODONE BITARTRATE AND ACETAMINOPHEN 5; 325 MG/1; MG/1
1 TABLET ORAL EVERY 6 HOURS PRN
Qty: 12 TABLET | Refills: 0 | Status: ON HOLD | OUTPATIENT
Start: 2022-09-26 | End: 2022-09-27 | Stop reason: HOSPADM

## 2022-09-26 NOTE — TELEPHONE ENCOUNTER
Spoke with patient. Pain is in left flank/groin, 1-2/10 currently. Will have sharp, stinging pain at times 7-8/10. He has tried naproxen and tylenol for the pain. He will stop by office today to  rx for pain meds. He is scheduled for surgery tomorrow.

## 2022-09-27 ENCOUNTER — HOSPITAL ENCOUNTER (OUTPATIENT)
Age: 57
Setting detail: OUTPATIENT SURGERY
Discharge: HOME | End: 2022-09-27
Attending: UROLOGY | Admitting: UROLOGY
Payer: COMMERCIAL

## 2022-09-27 ENCOUNTER — ANESTHESIA (OUTPATIENT)
Dept: OPERATING ROOM | Age: 57
End: 2022-09-27
Payer: COMMERCIAL

## 2022-09-27 ENCOUNTER — TELEPHONE (OUTPATIENT)
Dept: UROLOGY | Age: 57
End: 2022-09-27

## 2022-09-27 ENCOUNTER — APPOINTMENT (OUTPATIENT)
Dept: GENERAL RADIOLOGY | Age: 57
End: 2022-09-27
Attending: UROLOGY
Payer: COMMERCIAL

## 2022-09-27 VITALS
WEIGHT: 229 LBS | OXYGEN SATURATION: 97 % | HEIGHT: 77 IN | RESPIRATION RATE: 18 BRPM | HEART RATE: 66 BPM | TEMPERATURE: 97.1 F | BODY MASS INDEX: 27.04 KG/M2 | DIASTOLIC BLOOD PRESSURE: 101 MMHG | SYSTOLIC BLOOD PRESSURE: 151 MMHG

## 2022-09-27 DIAGNOSIS — N20.1 URETERAL STONE: Primary | ICD-10-CM

## 2022-09-27 PROCEDURE — 2580000003 HC RX 258: Performed by: ANESTHESIOLOGY

## 2022-09-27 RX ORDER — SODIUM CHLORIDE 0.9 % (FLUSH) 0.9 %
5-40 SYRINGE (ML) INJECTION EVERY 12 HOURS SCHEDULED
Status: DISCONTINUED | OUTPATIENT
Start: 2022-09-27 | End: 2022-09-27 | Stop reason: HOSPADM

## 2022-09-27 RX ORDER — HYDRALAZINE HYDROCHLORIDE 20 MG/ML
10 INJECTION INTRAMUSCULAR; INTRAVENOUS
Status: CANCELLED | OUTPATIENT
Start: 2022-09-27

## 2022-09-27 RX ORDER — MEPERIDINE HYDROCHLORIDE 25 MG/ML
12.5 INJECTION INTRAMUSCULAR; INTRAVENOUS; SUBCUTANEOUS EVERY 5 MIN PRN
Status: CANCELLED | OUTPATIENT
Start: 2022-09-27

## 2022-09-27 RX ORDER — LABETALOL HYDROCHLORIDE 5 MG/ML
10 INJECTION, SOLUTION INTRAVENOUS
Status: CANCELLED | OUTPATIENT
Start: 2022-09-27

## 2022-09-27 RX ORDER — FENTANYL CITRATE 50 UG/ML
25 INJECTION, SOLUTION INTRAMUSCULAR; INTRAVENOUS EVERY 5 MIN PRN
Status: CANCELLED | OUTPATIENT
Start: 2022-09-27

## 2022-09-27 RX ORDER — SODIUM CHLORIDE 0.9 % (FLUSH) 0.9 %
5-40 SYRINGE (ML) INJECTION PRN
Status: CANCELLED | OUTPATIENT
Start: 2022-09-27

## 2022-09-27 RX ORDER — LIDOCAINE HYDROCHLORIDE 10 MG/ML
1 INJECTION, SOLUTION EPIDURAL; INFILTRATION; INTRACAUDAL; PERINEURAL
Status: DISCONTINUED | OUTPATIENT
Start: 2022-09-27 | End: 2022-09-27 | Stop reason: HOSPADM

## 2022-09-27 RX ORDER — SODIUM CHLORIDE, SODIUM LACTATE, POTASSIUM CHLORIDE, CALCIUM CHLORIDE 600; 310; 30; 20 MG/100ML; MG/100ML; MG/100ML; MG/100ML
INJECTION, SOLUTION INTRAVENOUS CONTINUOUS
Status: DISCONTINUED | OUTPATIENT
Start: 2022-09-27 | End: 2022-09-27 | Stop reason: HOSPADM

## 2022-09-27 RX ORDER — CEPHALEXIN 500 MG/1
500 CAPSULE ORAL 3 TIMES DAILY
Qty: 15 CAPSULE | Refills: 0 | Status: CANCELLED | OUTPATIENT
Start: 2022-09-27

## 2022-09-27 RX ORDER — SODIUM CHLORIDE 9 MG/ML
INJECTION, SOLUTION INTRAVENOUS PRN
Status: CANCELLED | OUTPATIENT
Start: 2022-09-27

## 2022-09-27 RX ORDER — METOCLOPRAMIDE HYDROCHLORIDE 5 MG/ML
10 INJECTION INTRAMUSCULAR; INTRAVENOUS
Status: CANCELLED | OUTPATIENT
Start: 2022-09-27 | End: 2022-09-28

## 2022-09-27 RX ORDER — ONDANSETRON 2 MG/ML
4 INJECTION INTRAMUSCULAR; INTRAVENOUS
Status: CANCELLED | OUTPATIENT
Start: 2022-09-27 | End: 2022-09-28

## 2022-09-27 RX ORDER — HYDROCODONE BITARTRATE AND ACETAMINOPHEN 5; 325 MG/1; MG/1
1 TABLET ORAL EVERY 6 HOURS PRN
Qty: 28 TABLET | Refills: 0 | Status: ON HOLD | OUTPATIENT
Start: 2022-09-27 | End: 2022-09-28 | Stop reason: HOSPADM

## 2022-09-27 RX ORDER — SODIUM CHLORIDE 9 MG/ML
INJECTION, SOLUTION INTRAVENOUS PRN
Status: DISCONTINUED | OUTPATIENT
Start: 2022-09-27 | End: 2022-09-27 | Stop reason: HOSPADM

## 2022-09-27 RX ORDER — SODIUM CHLORIDE 0.9 % (FLUSH) 0.9 %
5-40 SYRINGE (ML) INJECTION EVERY 12 HOURS SCHEDULED
Status: CANCELLED | OUTPATIENT
Start: 2022-09-27

## 2022-09-27 RX ORDER — DIPHENHYDRAMINE HYDROCHLORIDE 50 MG/ML
12.5 INJECTION INTRAMUSCULAR; INTRAVENOUS
Status: CANCELLED | OUTPATIENT
Start: 2022-09-27 | End: 2022-09-28

## 2022-09-27 RX ORDER — SODIUM CHLORIDE 0.9 % (FLUSH) 0.9 %
5-40 SYRINGE (ML) INJECTION PRN
Status: DISCONTINUED | OUTPATIENT
Start: 2022-09-27 | End: 2022-09-27 | Stop reason: HOSPADM

## 2022-09-27 RX ADMIN — SODIUM CHLORIDE, POTASSIUM CHLORIDE, SODIUM LACTATE AND CALCIUM CHLORIDE: 600; 310; 30; 20 INJECTION, SOLUTION INTRAVENOUS at 10:19

## 2022-09-27 ASSESSMENT — ENCOUNTER SYMPTOMS
SHORTNESS OF BREATH: 1
STRIDOR: 0
GASTROINTESTINAL NEGATIVE: 1

## 2022-09-27 ASSESSMENT — LIFESTYLE VARIABLES: SMOKING_STATUS: 0

## 2022-09-27 ASSESSMENT — PAIN - FUNCTIONAL ASSESSMENT: PAIN_FUNCTIONAL_ASSESSMENT: 0-10

## 2022-09-27 NOTE — H&P
HISTORY and [facility] [id number]       NAME:  Naren Pacheco  MRN: [id number]   YOB: 1965   Date: 9/27/2022   Age: 56 y.o. Gender: male       COMPLAINT AND PRESENT HISTORY:     Naren Pacheco is 56 y.o.,  male, presents for URETEROSCOPY  HOLMIUM LASER  AND STENT PLACEMENT   Primary dx: URETERAL STONE LEFT.  HPI:  Naren Pacheco is 56 y.o.,   male, hs hx of kidney stone, he has 5 mm left ureteral stone with left hydro. Pt C/O of left flank pain  radiating to his left groin. Pain as dull pain , pain rating 3/10 on the 0-10 scale with poor flow of urine, dribbling, frequency and a sense of incomplete evacuation of the bladder. Symptoms started 3 months ago. No nausea,  vomiting or diaphoresis. No dysuria. Pt has dark red/brown discoloration of the urine, no fever or chills. Review of additional significant medical hx:  Asthma  Current medication r/t condition : albuterol inhaler     Hx of cardiac radiofrequency SVT ablation  x2 ,  PVC,   Pt state the last time he saw his cardiologist two years ago ,  Dr. Lucas Alonzo. [facility]    ECG 4/6/22  Narrative & Impression  Normal sinus rhythm  Incomplete right bundle branch block  Borderline ECG  When compared with ECG of 05-JUL-2019 17:25,  Vent. rate has decreased BY  54 BPM  Incomplete right bundle branch block is now Present    Sleep apnea   no c pap machine use at this time       NPO status: pt Npo since the past midnight   Medications taken TODAY (with sip of water): none  Anticoagulation status: none  Denies personal hx of blood clots. Denies personal hx of MRSA infection. Denies any personal or family hx of previous complications w/anesthesia.     PAST MEDICAL HISTORY     Past Medical History:   Diagnosis Date    Arthritis     left knee, and bilateral hands    Asthma     Back pain     H/O cardiac radiofrequency ablation January and March 2015    Headache(784.0)     PVC (premature ventricular contraction) Sleep apnea     no C-pap at this time. SVT (supraventricular tachycardia) ([address]     Vertigo        SURGICAL HISTORY       Past Surgical History:   Procedure Laterality Date    ABLATION OF DYSRHYTHMIC FOCUS  2016    pvc's /  DR Duyen Figueredo    ANKLE SURGERY Right     mole removed    CARDIAC SURGERY  2015    SVT ablation x2    CARPAL TUNNEL RELEASE Right 2018    CHOLECYSTECTOMY      KNEE ARTHROSCOPY Left     KNEE SURGERY Left     scraped arthritis off    ROTATOR CUFF REPAIR Left     ROTATOR CUFF REPAIR Left     TONSILLECTOMY      adnoids       FAMILY HISTORY     No family history on file. SOCIAL HISTORY       Social History     Socioeconomic History    Marital status: Single   Occupational History    Occupation:    Tobacco Use    Smoking status: Former     Types: Cigarettes     Quit date: 2009     Years since quittin.2    Smokeless tobacco: Never    Tobacco comments:     quit 6 years ago   Vaping Use    Vaping Use: Never used   Substance and Sexual Activity    Alcohol use: Yes     Comment: rarely    Drug use: No           REVIEW OF SYSTEMS      Allergies   Allergen Reactions    Seasonal Itching, Dermatitis and Rash    Terbinafine Rash     Other reaction(s): bleeding skin, peeling cracking  Other reaction(s): bleeding skin, peeling cracking         No current facility-administered medications on file prior to encounter. Current Outpatient Medications on File Prior to Encounter   Medication Sig Dispense Refill    lisinopril-hydroCHLOROthiazide (PRINZIDE;ZESTORETIC) 20-12.5 MG per tablet Take 1 tablet by mouth daily      tamsulosin (FLOMAX) 0.4 MG capsule TAKE 1 CAPSULE (0.4 MG TOTAL) BY MOUTH IN THE MORNING.       acetaminophen (TYLENOL) 500 MG tablet Take 2 tablets by mouth 3 times daily for 10 days 60 tablet 0    naproxen sodium (ANAPROX) 550 MG tablet TAKE 1 TABLET BY MOUTH 2 TIMES DAILY AS NEEDED FOR PAIN (HEADACHE) 14 tablet 3    rizatriptan (MAXALT) 10 MG tablet PLEASE SEE ATTACHED FOR DETAILED DIRECTIONS 6 tablet 1    rOPINIRole (REQUIP) 0.5 MG tablet Take 0.5 mg by mouth 3 times daily      topiramate (TOPAMAX) 50 MG tablet Take 1 tablet by mouth 2 times daily 60 tablet 5    amitriptyline (ELAVIL) 25 MG tablet Take 1 tablet by mouth nightly 90 tablet 2    buPROPion (WELLBUTRIN XL) 150 MG XL tablet Take 150 mg by mouth every morning      pregabalin (LYRICA) 100 MG capsule Take 100 mg by mouth 3 times daily      eszopiclone (LUNESTA) 1 MG TABS Take 1 mg by mouth nightly as needed   0    tiZANidine (ZANAFLEX) 4 MG tablet Take 4 mg by mouth every 8 hours as needed (spasms)   0       Review of Systems   Constitutional:  Positive for fatigue. HENT: Negative. Vertigo    Eyes:  Positive for visual disturbance. Eye glasses   Respiratory:  Positive for shortness of breath. Cardiovascular: Negative. Gastrointestinal: Negative. Genitourinary:  Positive for flank pain and frequency. Left flank pain    Skin: Negative. Neurological: Negative. Hematological: Negative. Psychiatric/Behavioral: Negative. GENERAL PHYSICAL EXAM     Vitals: see nursing flow sheet for vital signs     GENERAL APPEARANCE:   Salome Fuentes is 56 y.o.,  male, not obese, nourished, conscious, alert. Does not appear to be distress or pain at this time. Physical Exam  Constitutional:       Appearance: Normal appearance. He is normal weight. HENT:      Head: Normocephalic. Right Ear: External ear normal.      Left Ear: External ear normal.      Nose: Nose normal.      Mouth/Throat:      Mouth: Mucous membranes are moist.   Eyes:      General:         Right eye: No discharge. Left eye: No discharge. Cardiovascular:      Rate and Rhythm: Normal rate and regular rhythm. Pulses: Normal pulses. Radial pulses are 2+ on the right side and 2+ on the left side.         Dorsalis pedis pulses are 2+ on the right side and 2+ on the left side. Posterior tibial pulses are 2+ on the right side and 2+ on the left side. Heart sounds: Normal heart sounds. No murmur heard. No gallop. Pulmonary:      Effort: Pulmonary effort is normal.      Breath sounds: Normal breath sounds. No stridor. No wheezing or rhonchi. Abdominal:      General: Bowel sounds are normal. There is no distension. Palpations: Abdomen is soft. There is no mass. Tenderness: There is no abdominal tenderness. Musculoskeletal:         General: No swelling or tenderness. Normal range of motion. Cervical back: Normal range of motion and neck supple. Right lower leg: No edema. Left lower leg: No edema. Skin:     General: Skin is warm and dry. Findings: No bruising or erythema. Neurological:      General: No focal deficit present. Mental Status: He is alert and oriented to person, place, and time. Motor: No weakness.       Gait: Gait normal.   Psychiatric:         Mood and Affect: Mood normal.         Behavior: Behavior normal.                 PROVISIONAL DIAGNOSES / SURGERY:      URETERAL STONE LEFT    URETEROSCOPY  HOLMIUM LASER  AND STENT PLACEMENT     Patient Active Problem List    Diagnosis Date Noted    Migraine without aura and without status migrainosus, not intractable     SIRS (systemic inflammatory response syndrome) (HCC)     Phlebitis of superficial vein of upper extremity     Cellulitis of left arm     Cellulitis of right arm     Fever chills     Confusion     S/P ablation operation for arrhythmia     Retrograde amnesia     Symptomatic bradycardia 07/02/2019    Headache 07/29/2016    Meningitis 07/29/2016    Sacroiliitis ([facility] [address]     Spondylosis of lumbosacral region            ALLIE Bright - CNP on 9/27/2022 at 9:13 AM

## 2022-09-27 NOTE — ANESTHESIA PRE PROCEDURE
Department of Anesthesiology  Preprocedure Note       Name:  Kayli Barlow   Age:  56 y.o.  :  1965                                          MRN:  [id number]         Date:  2022      Surgeon: Marquez Schwarz):  Afton Holstein, MD    Procedure: Procedure(s):  URETEROSCOPY  HOLMIUM LASER  AND STENT PLACEMENT    Medications prior to admission:   Prior to Admission medications    Medication Sig Start Date End Date Taking? Authorizing Provider   HYDROcodone-acetaminophen (NORCO) 5-325 MG per tablet Take 1 tablet by mouth every 6 hours as needed for Pain for up to 3 days.  22  Marielos Dimmer, APRN - CNP   lisinopril-hydroCHLOROthiazide (PRINZIDE;ZESTORETIC) 20-12.5 MG per tablet Take 1 tablet by mouth daily 3/4/22   Historical Provider, MD   tamsulosin (FLOMAX) 0.4 MG capsule TAKE 1 CAPSULE (0.4 MG TOTAL) BY MOUTH IN THE MORNING. 22   Historical Provider, MD   acetaminophen (TYLENOL) 500 MG tablet Take 2 tablets by mouth 3 times daily for 10 days 21  Isaías Torres MD   naproxen sodium (ANAPROX) 550 MG tablet TAKE 1 TABLET BY MOUTH 2 TIMES DAILY AS NEEDED FOR PAIN (HEADACHE) 20   Schulyer Baker MD   rizatriptan (MAXALT) 10 MG tablet PLEASE SEE ATTACHED FOR DETAILED DIRECTIONS 20   Schuyler Baker MD   rOPINIRole (REQUIP) 0.5 MG tablet Take 0.5 mg by mouth 3 times daily    Historical Provider, MD   topiramate (TOPAMAX) 50 MG tablet Take 1 tablet by mouth 2 times daily 19   Idris Rios MD   amitriptyline (ELAVIL) 25 MG tablet Take 1 tablet by mouth nightly 19   Idris Rios MD   buPROPion (WELLBUTRIN XL) 150 MG XL tablet Take 150 mg by mouth every morning    Historical Provider, MD   pregabalin (LYRICA) 100 MG capsule Take 100 mg by mouth 3 times daily    Historical Provider, MD   eszopiclone (LUNESTA) 1 MG TABS Take 1 mg by mouth nightly as needed  3/2/16   Historical Provider, MD   tiZANidine (ZANAFLEX) 4 MG tablet Take 4 mg by mouth every 8 hours as needed (spasms)  3/7/16 Historical Provider, MD       Current medications:    Current Facility-Administered Medications   Medication Dose Route Frequency Provider Last Rate Last Admin    lidocaine PF 1 % injection 1 mL  1 mL IntraDERmal Once PRN Lucio Osullivan MD        lactated ringers infusion   IntraVENous Continuous Lucio Osullivan MD        sodium chloride flush 0.9 % injection 5-40 mL  5-40 mL IntraVENous 2 times per day Lucio Osullivan MD        sodium chloride flush 0.9 % injection 5-40 mL  5-40 mL IntraVENous PRN Lucio Osullivan MD        0.9 % sodium chloride infusion   IntraVENous PRN Lucio Osullivan MD           Allergies: Allergies   Allergen Reactions    Seasonal Itching, Dermatitis and Rash    Terbinafine Rash     Other reaction(s): bleeding skin, peeling cracking  Other reaction(s): bleeding skin, peeling cracking         Problem List:    Patient Active Problem List   Diagnosis Code    Spondylosis of lumbosacral region M47.817    Sacroiliitis ([facility] [place] [address] M46.1    Headache R51.9    Meningitis G03.9    Symptomatic bradycardia R00.1    Confusion R41.0    S/P ablation operation for arrhythmia Z98.890, Z86.79    Retrograde amnesia R41.2    SIRS (systemic inflammatory response syndrome) (HCC) R65.10    Phlebitis of superficial vein of upper extremity I80.8    Cellulitis of left arm L03.114    Cellulitis of right arm L03. 113    Fever chills R50.9    Migraine without aura and without status migrainosus, not intractable G43.009       Past Medical History:        Diagnosis Date    Arthritis     left knee, and bilateral hands    Asthma     Back pain     H/O cardiac radiofrequency ablation January and March 2015    Headache(784.0)     PVC (premature ventricular contraction)     Sleep apnea     no C-pap at this time.     SVT (supraventricular tachycardia) (HCC)     Vertigo        Past Surgical History:        Procedure Laterality Date    ABLATION OF DYSRHYTHMIC FOCUS  08/06/2016    pvc's /  DR Boudreaux Re    ANKLE SURGERY Right     mole removed    CARDIAC SURGERY  2015    SVT ablation x2    CARPAL TUNNEL RELEASE Right 2018    CHOLECYSTECTOMY      KNEE ARTHROSCOPY Left     KNEE SURGERY Left     scraped arthritis off    ROTATOR CUFF REPAIR Left     ROTATOR CUFF REPAIR Left     TONSILLECTOMY      adnoids       Social History:    Social History     Tobacco Use    Smoking status: Former     Types: Cigarettes     Quit date: 2009     Years since quittin.2    Smokeless tobacco: Never    Tobacco comments:     quit 6 years ago   Substance Use Topics    Alcohol use: Yes     Comment: rarely                                Counseling given: Not Answered  Tobacco comments: quit 6 years ago      Vital Signs (Current): There were no vitals filed for this visit.                                            BP Readings from Last 3 Encounters:   22 122/84   22 130/86   21 103/77       NPO Status:                                                                                 BMI:   Wt Readings from Last 3 Encounters:   22 235 lb (106.6 kg)   22 235 lb (106.6 kg)   22 235 lb (106.6 kg)     There is no height or weight on file to calculate BMI.    CBC:   Lab Results   Component Value Date/Time    WBC 8.2 2021 10:07 PM    RBC 4.85 2021 10:07 PM    HGB 15.2 2021 10:07 PM    HCT 45.6 2021 10:07 PM    MCV 94.0 2021 10:07 PM    RDW 12.4 2021 10:07 PM     2021 10:07 PM     LR    CMP:   Lab Results   Component Value Date/Time     2021 10:07 PM    K 4.0 2021 10:07 PM     2021 10:07 PM    CO2 20 2021 10:07 PM    BUN 16 2021 10:07 PM    CREATININE 0.95 2021 10:07 PM    GFRAA >60 2021 10:07 PM    LABGLOM >60 2021 10:07 PM    GLUCOSE 128 2021 10:07 PM    PROT 6.4 2019 03:12 AM    CALCIUM 8.8 2021 10:07 PM    BILITOT 0.55 2019 03:12 AM    ALKPHOS 65 2019 03:12 AM    AST 21 07/03/2019 03:12 AM    ALT 24 07/03/2019 03:12 AM       POC Tests: No results for input(s): POCGLU, POCNA, POCK, POCCL, POCBUN, POCHEMO, POCHCT in the last 72 hours. Coags:   Lab Results   Component Value Date/Time    PROTIME 10.6 07/29/2016 02:56 PM    INR 1.0 07/29/2016 02:56 PM       HCG (If Applicable): No results found for: PREGTESTUR, PREGSERUM, HCG, HCGQUANT     ABGs: No results found for: PHART, PO2ART, DOJ2CQZ, LXJ8ZVK, BEART, Z6VMJLRQ     Type & Screen (If Applicable):  No results found for: LABABO, LABRH    Drug/Infectious Status (If Applicable):  No results found for: HIV, HEPCAB    COVID-19 Screening (If Applicable): No results found for: COVID19        Anesthesia Evaluation  Patient summary reviewed and Nursing notes reviewed  Airway: Mallampati: III  TM distance: >3 FB   Neck ROM: full  Mouth opening: > = 3 FB   Dental: normal exam         Pulmonary:normal exam  breath sounds clear to auscultation  (+) sleep apnea:  asthma: allergic asthma,     (-) pneumonia, COPD, recent URI, rhonchi, wheezes, rales, stridor, not a current smoker and no decreased breath sounds          Patient did not smoke on day of surgery.                  Cardiovascular:Negative CV ROS  Exercise tolerance: good (>4 METS),   (+) dysrhythmias: PVC and SVT,     (-) pacemaker, hypertension, valvular problems/murmurs, past MI, CAD, CABG/stent,  angina,  CHF, orthopnea, PND,  ACOSTA, murmur, weak pulses,  friction rub, systolic click, carotid bruit,  JVD, peripheral edema, no pulmonary hypertension and no hyperlipidemia    ECG reviewed  Rhythm: regular  Rate: normal  Echocardiogram reviewed         Beta Blocker:  Not on Beta Blocker         Neuro/Psych:   (+) headaches:, psychiatric history: stable with treatmentdepression/anxiety    (-) seizures, neuromuscular disease, TIA and CVA           GI/Hepatic/Renal: Neg GI/Hepatic/Renal ROS       (-) hiatal hernia, GERD, PUD, hepatitis, liver disease, no renal disease, bowel prep and no morbid obesity       Endo/Other:    (+) : arthritis: OA., no malignancy/cancer. (-) diabetes mellitus, hypothyroidism, hyperthyroidism, blood dyscrasia, no electrolyte abnormalities, no malignancy/cancer               Abdominal:             Vascular: negative vascular ROS. - PVD, DVT and PE. Other Findings:           Anesthesia Plan      general     ASA 3       Induction: intravenous. MIPS: Postoperative opioids intended and Prophylactic antiemetics administered. Anesthetic plan and risks discussed with patient. Plan discussed with CRNA.                     Santos Perez MD   9/27/2022

## 2022-09-27 NOTE — TELEPHONE ENCOUNTER
Cysto, (LT) URS, HLL, (LT) stent placement @ ST 9/28/22 3:00pm   PAT same day               Spoke with patient, procedure info emailed.

## 2022-09-28 ENCOUNTER — HOSPITAL ENCOUNTER (OUTPATIENT)
Age: 57
Setting detail: OUTPATIENT SURGERY
Discharge: HOME | End: 2022-09-28
Attending: UROLOGY | Admitting: UROLOGY
Payer: COMMERCIAL

## 2022-09-28 ENCOUNTER — APPOINTMENT (OUTPATIENT)
Dept: GENERAL RADIOLOGY | Age: 57
End: 2022-09-28
Attending: UROLOGY
Payer: COMMERCIAL

## 2022-09-28 ENCOUNTER — ANESTHESIA (OUTPATIENT)
Dept: OPERATING ROOM | Age: 57
End: 2022-09-28
Payer: COMMERCIAL

## 2022-09-28 ENCOUNTER — ANESTHESIA EVENT (OUTPATIENT)
Dept: OPERATING ROOM | Age: 57
End: 2022-09-28
Payer: COMMERCIAL

## 2022-09-28 VITALS
DIASTOLIC BLOOD PRESSURE: 95 MMHG | SYSTOLIC BLOOD PRESSURE: 125 MMHG | WEIGHT: 229 LBS | RESPIRATION RATE: 19 BRPM | TEMPERATURE: 98.2 F | BODY MASS INDEX: 27.04 KG/M2 | HEART RATE: 69 BPM | HEIGHT: 77 IN | OXYGEN SATURATION: 95 %

## 2022-09-28 DIAGNOSIS — N20.0 NEPHROLITHIASIS: Primary | ICD-10-CM

## 2022-09-28 LAB
EKG ATRIAL RATE: 68 BPM
EKG P AXIS: 0 DEGREES
EKG P-R INTERVAL: 182 MS
EKG Q-T INTERVAL: 420 MS
EKG QRS DURATION: 118 MS
EKG QTC CALCULATION (BAZETT): 446 MS
EKG R AXIS: 35 DEGREES
EKG T AXIS: 12 DEGREES
EKG VENTRICULAR RATE: 68 BPM
GFR NON-AFRICAN AMERICAN: >60 ML/MIN
GFR SERPL CREATININE-BSD FRML MDRD: >60 ML/MIN
GFR SERPL CREATININE-BSD FRML MDRD: ABNORMAL ML/MIN/{1.73_M2}
GLUCOSE BLD-MCNC: 100 MG/DL (ref 74–100)
POC BUN: 15 MG/DL (ref 8–26)
POC CREATININE: 1.21 MG/DL (ref 0.51–1.19)
POC POTASSIUM: 3.8 MMOL/L (ref 3.5–4.5)

## 2022-09-28 PROCEDURE — 6360000002 HC RX W HCPCS: Performed by: STUDENT IN AN ORGANIZED HEALTH CARE EDUCATION/TRAINING PROGRAM

## 2022-09-28 PROCEDURE — C1769 GUIDE WIRE: HCPCS | Performed by: UROLOGY

## 2022-09-28 PROCEDURE — 2580000003 HC RX 258

## 2022-09-28 PROCEDURE — 3209999900 FLUORO FOR SURGICAL PROCEDURES

## 2022-09-28 PROCEDURE — 84132 ASSAY OF SERUM POTASSIUM: CPT

## 2022-09-28 PROCEDURE — 82565 ASSAY OF CREATININE: CPT

## 2022-09-28 PROCEDURE — 7100000001 HC PACU RECOVERY - ADDTL 15 MIN: Performed by: UROLOGY

## 2022-09-28 PROCEDURE — 93005 ELECTROCARDIOGRAM TRACING: CPT | Performed by: ANESTHESIOLOGY

## 2022-09-28 PROCEDURE — 93010 ELECTROCARDIOGRAM REPORT: CPT | Performed by: INTERNAL MEDICINE

## 2022-09-28 PROCEDURE — 2580000003 HC RX 258: Performed by: UROLOGY

## 2022-09-28 PROCEDURE — C1758 CATHETER, URETERAL: HCPCS | Performed by: UROLOGY

## 2022-09-28 PROCEDURE — 7100000000 HC PACU RECOVERY - FIRST 15 MIN: Performed by: UROLOGY

## 2022-09-28 PROCEDURE — 7100000010 HC PHASE II RECOVERY - FIRST 15 MIN: Performed by: UROLOGY

## 2022-09-28 PROCEDURE — 82947 ASSAY GLUCOSE BLOOD QUANT: CPT

## 2022-09-28 PROCEDURE — C2617 STENT, NON-COR, TEM W/O DEL: HCPCS | Performed by: UROLOGY

## 2022-09-28 PROCEDURE — 3700000001 HC ADD 15 MINUTES (ANESTHESIA): Performed by: UROLOGY

## 2022-09-28 PROCEDURE — 2709999900 HC NON-CHARGEABLE SUPPLY: Performed by: UROLOGY

## 2022-09-28 PROCEDURE — 3600000014 HC SURGERY LEVEL 4 ADDTL 15MIN: Performed by: UROLOGY

## 2022-09-28 PROCEDURE — 2500000003 HC RX 250 WO HCPCS

## 2022-09-28 PROCEDURE — 6360000002 HC RX W HCPCS

## 2022-09-28 PROCEDURE — 3600000004 HC SURGERY LEVEL 4 BASE: Performed by: UROLOGY

## 2022-09-28 PROCEDURE — 84520 ASSAY OF UREA NITROGEN: CPT

## 2022-09-28 PROCEDURE — 3700000000 HC ANESTHESIA ATTENDED CARE: Performed by: UROLOGY

## 2022-09-28 DEVICE — URETERAL STENT
Type: IMPLANTABLE DEVICE | Status: FUNCTIONAL
Brand: PERCUFLEX™ PLUS

## 2022-09-28 RX ORDER — CEFADROXIL 500 MG/1
500 CAPSULE ORAL 2 TIMES DAILY
Qty: 6 CAPSULE | Refills: 0 | Status: SHIPPED | OUTPATIENT
Start: 2022-09-28 | End: 2022-10-01

## 2022-09-28 RX ORDER — DEXAMETHASONE SODIUM PHOSPHATE 10 MG/ML
INJECTION INTRAMUSCULAR; INTRAVENOUS PRN
Status: DISCONTINUED | OUTPATIENT
Start: 2022-09-28 | End: 2022-09-28 | Stop reason: SDUPTHER

## 2022-09-28 RX ORDER — SODIUM CHLORIDE, SODIUM LACTATE, POTASSIUM CHLORIDE, CALCIUM CHLORIDE 600; 310; 30; 20 MG/100ML; MG/100ML; MG/100ML; MG/100ML
INJECTION, SOLUTION INTRAVENOUS CONTINUOUS PRN
Status: DISCONTINUED | OUTPATIENT
Start: 2022-09-28 | End: 2022-09-28 | Stop reason: SDUPTHER

## 2022-09-28 RX ORDER — TRAMADOL HYDROCHLORIDE 50 MG/1
50 TABLET ORAL EVERY 6 HOURS PRN
Qty: 12 TABLET | Refills: 0 | Status: SHIPPED | OUTPATIENT
Start: 2022-09-28 | End: 2022-10-01

## 2022-09-28 RX ORDER — FENTANYL CITRATE 50 UG/ML
50 INJECTION, SOLUTION INTRAMUSCULAR; INTRAVENOUS EVERY 5 MIN PRN
Status: DISCONTINUED | OUTPATIENT
Start: 2022-09-28 | End: 2022-09-28 | Stop reason: HOSPADM

## 2022-09-28 RX ORDER — SODIUM CHLORIDE 0.9 % (FLUSH) 0.9 %
5-40 SYRINGE (ML) INJECTION EVERY 12 HOURS SCHEDULED
Status: CANCELLED | OUTPATIENT
Start: 2022-09-28

## 2022-09-28 RX ORDER — ONDANSETRON 2 MG/ML
INJECTION INTRAMUSCULAR; INTRAVENOUS PRN
Status: DISCONTINUED | OUTPATIENT
Start: 2022-09-28 | End: 2022-09-28 | Stop reason: SDUPTHER

## 2022-09-28 RX ORDER — SODIUM CHLORIDE 0.9 % (FLUSH) 0.9 %
5-40 SYRINGE (ML) INJECTION PRN
Status: CANCELLED | OUTPATIENT
Start: 2022-09-28

## 2022-09-28 RX ORDER — HYOSCYAMINE SULFATE 0.12 MG/1
1 TABLET SUBLINGUAL EVERY 8 HOURS PRN
Qty: 12 EACH | Refills: 0 | Status: SHIPPED | OUTPATIENT
Start: 2022-09-28 | End: 2022-10-11 | Stop reason: ALTCHOICE

## 2022-09-28 RX ORDER — FENTANYL CITRATE 50 UG/ML
INJECTION, SOLUTION INTRAMUSCULAR; INTRAVENOUS PRN
Status: DISCONTINUED | OUTPATIENT
Start: 2022-09-28 | End: 2022-09-28 | Stop reason: SDUPTHER

## 2022-09-28 RX ORDER — SODIUM CHLORIDE, SODIUM LACTATE, POTASSIUM CHLORIDE, CALCIUM CHLORIDE 600; 310; 30; 20 MG/100ML; MG/100ML; MG/100ML; MG/100ML
INJECTION, SOLUTION INTRAVENOUS CONTINUOUS
Status: CANCELLED | OUTPATIENT
Start: 2022-09-28

## 2022-09-28 RX ORDER — SODIUM CHLORIDE 9 MG/ML
INJECTION, SOLUTION INTRAVENOUS PRN
Status: CANCELLED | OUTPATIENT
Start: 2022-09-28

## 2022-09-28 RX ORDER — TAMSULOSIN HYDROCHLORIDE 0.4 MG/1
0.4 CAPSULE ORAL DAILY
Qty: 30 CAPSULE | Refills: 0 | Status: SHIPPED | OUTPATIENT
Start: 2022-09-28

## 2022-09-28 RX ORDER — LIDOCAINE HYDROCHLORIDE 10 MG/ML
INJECTION, SOLUTION EPIDURAL; INFILTRATION; INTRACAUDAL; PERINEURAL PRN
Status: DISCONTINUED | OUTPATIENT
Start: 2022-09-28 | End: 2022-09-28 | Stop reason: SDUPTHER

## 2022-09-28 RX ORDER — MAGNESIUM HYDROXIDE 1200 MG/15ML
LIQUID ORAL CONTINUOUS PRN
Status: DISCONTINUED | OUTPATIENT
Start: 2022-09-28 | End: 2022-09-28 | Stop reason: HOSPADM

## 2022-09-28 RX ORDER — LIDOCAINE HYDROCHLORIDE 10 MG/ML
1 INJECTION, SOLUTION INFILTRATION; PERINEURAL
Status: CANCELLED | OUTPATIENT
Start: 2022-09-28 | End: 2022-09-29

## 2022-09-28 RX ORDER — SODIUM CHLORIDE 9 MG/ML
INJECTION, SOLUTION INTRAVENOUS PRN
Status: DISCONTINUED | OUTPATIENT
Start: 2022-09-28 | End: 2022-09-28 | Stop reason: HOSPADM

## 2022-09-28 RX ORDER — SODIUM CHLORIDE 0.9 % (FLUSH) 0.9 %
5-40 SYRINGE (ML) INJECTION EVERY 12 HOURS SCHEDULED
Status: DISCONTINUED | OUTPATIENT
Start: 2022-09-28 | End: 2022-09-28 | Stop reason: HOSPADM

## 2022-09-28 RX ORDER — SODIUM CHLORIDE, SODIUM LACTATE, POTASSIUM CHLORIDE, CALCIUM CHLORIDE 600; 310; 30; 20 MG/100ML; MG/100ML; MG/100ML; MG/100ML
INJECTION, SOLUTION INTRAVENOUS CONTINUOUS
Status: DISCONTINUED | OUTPATIENT
Start: 2022-09-28 | End: 2022-09-28 | Stop reason: HOSPADM

## 2022-09-28 RX ORDER — ONDANSETRON 2 MG/ML
4 INJECTION INTRAMUSCULAR; INTRAVENOUS
Status: DISCONTINUED | OUTPATIENT
Start: 2022-09-28 | End: 2022-09-28 | Stop reason: HOSPADM

## 2022-09-28 RX ORDER — FENTANYL CITRATE 50 UG/ML
50 INJECTION, SOLUTION INTRAMUSCULAR; INTRAVENOUS
Status: CANCELLED | OUTPATIENT
Start: 2022-09-28 | End: 2022-09-29

## 2022-09-28 RX ORDER — PHENAZOPYRIDINE HYDROCHLORIDE 100 MG/1
100 TABLET, FILM COATED ORAL 3 TIMES DAILY PRN
Qty: 20 TABLET | Refills: 0 | Status: SHIPPED | OUTPATIENT
Start: 2022-09-28 | End: 2022-10-18

## 2022-09-28 RX ORDER — SODIUM CHLORIDE 0.9 % (FLUSH) 0.9 %
5-40 SYRINGE (ML) INJECTION PRN
Status: DISCONTINUED | OUTPATIENT
Start: 2022-09-28 | End: 2022-09-28 | Stop reason: HOSPADM

## 2022-09-28 RX ORDER — PROPOFOL 10 MG/ML
INJECTION, EMULSION INTRAVENOUS PRN
Status: DISCONTINUED | OUTPATIENT
Start: 2022-09-28 | End: 2022-09-28 | Stop reason: SDUPTHER

## 2022-09-28 RX ORDER — FENTANYL CITRATE 50 UG/ML
25 INJECTION, SOLUTION INTRAMUSCULAR; INTRAVENOUS EVERY 5 MIN PRN
Status: DISCONTINUED | OUTPATIENT
Start: 2022-09-28 | End: 2022-09-28 | Stop reason: HOSPADM

## 2022-09-28 RX ORDER — MIDAZOLAM HYDROCHLORIDE 1 MG/ML
INJECTION INTRAMUSCULAR; INTRAVENOUS PRN
Status: DISCONTINUED | OUTPATIENT
Start: 2022-09-28 | End: 2022-09-28 | Stop reason: SDUPTHER

## 2022-09-28 RX ORDER — DIPHENHYDRAMINE HYDROCHLORIDE 50 MG/ML
12.5 INJECTION INTRAMUSCULAR; INTRAVENOUS
Status: DISCONTINUED | OUTPATIENT
Start: 2022-09-28 | End: 2022-09-28 | Stop reason: HOSPADM

## 2022-09-28 RX ORDER — MIDAZOLAM HYDROCHLORIDE 2 MG/2ML
1 INJECTION, SOLUTION INTRAMUSCULAR; INTRAVENOUS EVERY 10 MIN PRN
Status: CANCELLED | OUTPATIENT
Start: 2022-09-28

## 2022-09-28 RX ORDER — FENTANYL CITRATE 50 UG/ML
25 INJECTION, SOLUTION INTRAMUSCULAR; INTRAVENOUS
Status: CANCELLED | OUTPATIENT
Start: 2022-09-28 | End: 2022-09-29

## 2022-09-28 RX ADMIN — ONDANSETRON 4 MG: 2 INJECTION INTRAMUSCULAR; INTRAVENOUS at 15:49

## 2022-09-28 RX ADMIN — SODIUM CHLORIDE, POTASSIUM CHLORIDE, SODIUM LACTATE AND CALCIUM CHLORIDE: 600; 310; 30; 20 INJECTION, SOLUTION INTRAVENOUS at 14:25

## 2022-09-28 RX ADMIN — FENTANYL CITRATE 50 MCG: 50 INJECTION, SOLUTION INTRAMUSCULAR; INTRAVENOUS at 16:20

## 2022-09-28 RX ADMIN — PROPOFOL 50 MG: 10 INJECTION, EMULSION INTRAVENOUS at 15:55

## 2022-09-28 RX ADMIN — PROPOFOL 200 MG: 10 INJECTION, EMULSION INTRAVENOUS at 15:37

## 2022-09-28 RX ADMIN — SODIUM CHLORIDE, POTASSIUM CHLORIDE, SODIUM LACTATE AND CALCIUM CHLORIDE: 600; 310; 30; 20 INJECTION, SOLUTION INTRAVENOUS at 15:28

## 2022-09-28 RX ADMIN — FENTANYL CITRATE 25 MCG: 50 INJECTION, SOLUTION INTRAMUSCULAR; INTRAVENOUS at 16:28

## 2022-09-28 RX ADMIN — MIDAZOLAM 2 MG: 1 INJECTION INTRAMUSCULAR; INTRAVENOUS at 15:30

## 2022-09-28 RX ADMIN — DEXAMETHASONE SODIUM PHOSPHATE 10 MG: 10 INJECTION INTRAMUSCULAR; INTRAVENOUS at 15:49

## 2022-09-28 RX ADMIN — FENTANYL CITRATE 25 MCG: 50 INJECTION, SOLUTION INTRAMUSCULAR; INTRAVENOUS at 16:26

## 2022-09-28 RX ADMIN — FENTANYL CITRATE 50 MCG: 50 INJECTION, SOLUTION INTRAMUSCULAR; INTRAVENOUS at 15:37

## 2022-09-28 RX ADMIN — LIDOCAINE HYDROCHLORIDE 50 MG: 10 INJECTION, SOLUTION EPIDURAL; INFILTRATION; INTRACAUDAL; PERINEURAL at 15:37

## 2022-09-28 RX ADMIN — Medication 2000 MG: at 15:51

## 2022-09-28 ASSESSMENT — PAIN - FUNCTIONAL ASSESSMENT: PAIN_FUNCTIONAL_ASSESSMENT: 0-10

## 2022-09-28 ASSESSMENT — LIFESTYLE VARIABLES: SMOKING_STATUS: 0

## 2022-09-28 ASSESSMENT — ENCOUNTER SYMPTOMS: STRIDOR: 0

## 2022-09-28 NOTE — ANESTHESIA PRE PROCEDURE
Department of Anesthesiology  Preprocedure Note       Name:  Lissy Little   Age:  56 y.o.  :  1965                                          MRN:  [id number]         Date:  2022      Surgeon: Magy Guerrier):  Beatrice Albarado MD    Procedure: Procedure(s):  HOLMIUM, CYSTOSCOPY, URETEROSCOPY, STENT PLACEMENT    Medications prior to admission:   Prior to Admission medications    Medication Sig Start Date End Date Taking? Authorizing Provider   HYDROcodone-acetaminophen (NORCO) 5-325 MG per tablet Take 1 tablet by mouth every 6 hours as needed for Pain for up to 7 days. Intended supply: 7 days.  Take lowest dose possible to manage pain 9/27/22 10/4/22  Beatrice Albarado MD   ALBUTEROL IN Inhale 2 puffs into the lungs every 12 hours as needed    Historical Provider, MD   lisinopril-hydroCHLOROthiazide (PRINZIDE;ZESTORETIC) 20-12.5 MG per tablet Take 1 tablet by mouth daily 3/4/22   Historical Provider, MD   tamsulosin (FLOMAX) 0.4 MG capsule TAKE 1 CAPSULE (0.4 MG TOTAL) BY MOUTH IN THE MORNING. 22   Historical Provider, MD   naproxen sodium (ANAPROX) 550 MG tablet TAKE 1 TABLET BY MOUTH 2 TIMES DAILY AS NEEDED FOR PAIN (HEADACHE) 20   Karoline Dakin, MD   rizatriptan (MAXALT) 10 MG tablet PLEASE SEE ATTACHED FOR DETAILED DIRECTIONS 20   Karoline Dakin, MD   rOPINIRole (REQUIP) 0.5 MG tablet Take 0.5 mg by mouth 3 times daily    Historical Provider, MD   topiramate (TOPAMAX) 50 MG tablet Take 1 tablet by mouth 2 times daily 19   Mirza Rose MD   amitriptyline (ELAVIL) 25 MG tablet Take 1 tablet by mouth nightly 19   Mirza Rose MD   buPROPion (WELLBUTRIN XL) 150 MG XL tablet Take 150 mg by mouth every morning    Historical Provider, MD   pregabalin (LYRICA) 100 MG capsule Take 100 mg by mouth 3 times daily    Historical Provider, MD   eszopiclone (LUNESTA) 1 MG TABS Take 1 mg by mouth nightly as needed  3/2/16   Historical Provider, MD   tiZANidine (ZANAFLEX) 4 MG tablet Take 4 mg by mouth every 8 hours as needed (spasms)  3/7/16   Historical Provider, MD       Current medications:    No current facility-administered medications for this visit. No current outpatient medications on file. Facility-Administered Medications Ordered in Other Visits   Medication Dose Route Frequency Provider Last Rate Last Admin    ceFAZolin (ANCEF) 2000 mg in sterile water 20 mL IV syringe  2,000 mg IntraVENous Once Donel MD Bobby        lactated ringers infusion   IntraVENous Continuous Serena Machado  mL/hr at 09/28/22 1425 New Bag at 09/28/22 1425       Allergies: Allergies   Allergen Reactions    Seasonal Itching, Dermatitis and Rash    Terbinafine Rash     Other reaction(s): bleeding skin, peeling cracking  Other reaction(s): bleeding skin, peeling cracking         Problem List:    Patient Active Problem List   Diagnosis Code    Spondylosis of lumbosacral region M47.817    Sacroiliitis ([facility] [address] M46.1    Headache R51.9    Meningitis G03.9    Symptomatic bradycardia R00.1    Confusion R41.0    S/P ablation operation for arrhythmia Z98.890, Z86.79    Retrograde amnesia R41.2    SIRS (systemic inflammatory response syndrome) (HCC) R65.10    Phlebitis of superficial vein of upper extremity I80.8    Cellulitis of left arm L03.114    Cellulitis of right arm L03. 113    Fever chills R50.9    Migraine without aura and without status migrainosus, not intractable G43.009       Past Medical History:        Diagnosis Date    Arthritis     left knee, and bilateral hands    Asthma     Back pain     H/O cardiac radiofrequency ablation January and March 2015    Headache(784.0)     PVC (premature ventricular contraction)     Sleep apnea     no C-pap at this time.     SVT (supraventricular tachycardia) (HCC)     Vertigo        Past Surgical History:        Procedure Laterality Date    ABLATION OF DYSRHYTHMIC FOCUS  08/06/2016    pvc's /  DR Hernandez Certain ANKLE SURGERY Right     mole removed    CARDIAC SURGERY  2015    SVT ablation x2    CARPAL TUNNEL RELEASE Right 2018    CHOLECYSTECTOMY      KNEE ARTHROSCOPY Left     KNEE SURGERY Left     scraped arthritis off    ROTATOR CUFF REPAIR Left     ROTATOR CUFF REPAIR Left     TONSILLECTOMY      adnoids       Social History:    Social History     Tobacco Use    Smoking status: Former     Types: Cigarettes     Quit date: 2009     Years since quittin.2    Smokeless tobacco: Never    Tobacco comments:     quit 6 years ago   Substance Use Topics    Alcohol use: Yes     Comment: rarely                                Counseling given: Not Answered  Tobacco comments: quit 6 years ago      Vital Signs (Current): There were no vitals filed for this visit.                                            BP Readings from Last 3 Encounters:   22 (!) 142/102   22 (!) 151/101   22 122/84       NPO Status:                                                                                 BMI:   Wt Readings from Last 3 Encounters:   22 229 lb (103.9 kg)   22 229 lb (103.9 kg)   22 235 lb (106.6 kg)     There is no height or weight on file to calculate BMI.    CBC:   Lab Results   Component Value Date/Time    WBC 8.2 2021 10:07 PM    RBC 4.85 2021 10:07 PM    HGB 15.2 2021 10:07 PM    HCT 45.6 2021 10:07 PM    MCV 94.0 2021 10:07 PM    RDW 12.4 2021 10:07 PM     2021 10:07 PM     LR    CMP:   Lab Results   Component Value Date/Time     2021 10:07 PM    K 4.0 2021 10:07 PM     2021 10:07 PM    CO2 20 2021 10:07 PM    BUN 16 2021 10:07 PM    CREATININE 1.21 2022 02:23 PM    CREATININE 0.95 2021 10:07 PM    GFRAA >60 2021 10:07 PM    LABGLOM >60 2022 02:23 PM    GLUCOSE 128 2021 10:07 PM    PROT 6.4 2019 03:12 AM    CALCIUM 8.8 2021 10:07 PM    BILITOT 0.55 2019 03:12 AM    ALKPHOS 65 2019 03:12 AM    AST 21 07/03/2019 03:12 AM    ALT 24 07/03/2019 03:12 AM       POC Tests:   Recent Labs     09/28/22  1423   POCGLU 100   POCK 3.8   POCBUN 15       Coags:   Lab Results   Component Value Date/Time    PROTIME 10.6 07/29/2016 02:56 PM    INR 1.0 07/29/2016 02:56 PM       HCG (If Applicable): No results found for: PREGTESTUR, PREGSERUM, HCG, HCGQUANT     ABGs: No results found for: PHART, PO2ART, DWH0QQM, XEQ1AZB, BEART, E6NNOEJE     Type & Screen (If Applicable):  No results found for: LABABO, LABRH    Drug/Infectious Status (If Applicable):  No results found for: HIV, HEPCAB    COVID-19 Screening (If Applicable): No results found for: COVID19        Anesthesia Evaluation  Patient summary reviewed and Nursing notes reviewed  Airway: Mallampati: III  TM distance: >3 FB   Neck ROM: full  Mouth opening: > = 3 FB   Dental: normal exam         Pulmonary:normal exam  breath sounds clear to auscultation  (+) sleep apnea:  asthma: allergic asthma,     (-) pneumonia, COPD, recent URI, rhonchi, wheezes, rales, stridor, not a current smoker and no decreased breath sounds          Patient did not smoke on day of surgery.                  Cardiovascular:Negative CV ROS  Exercise tolerance: good (>4 METS),   (+) dysrhythmias: PVC and SVT,     (-) pacemaker, hypertension, valvular problems/murmurs, past MI, CAD, CABG/stent,  angina,  CHF, orthopnea, PND,  ACOSTA, murmur, weak pulses,  friction rub, systolic click, carotid bruit,  JVD, peripheral edema, no pulmonary hypertension and no hyperlipidemia    ECG reviewed  Rhythm: regular  Rate: normal  Echocardiogram reviewed         Beta Blocker:  Not on Beta Blocker         Neuro/Psych:   (+) headaches:, psychiatric history: stable with treatmentdepression/anxiety    (-) seizures, neuromuscular disease, TIA and CVA           GI/Hepatic/Renal: Neg GI/Hepatic/Renal ROS       (-) hiatal hernia, GERD, PUD, hepatitis, liver disease, no renal disease, bowel prep and no morbid

## 2022-09-28 NOTE — H&P
Urology History and Physical    Patient:  Saadia Klein  MRN: [id number]  YOB: 1965    CHIEF COMPLAINT: Nephrolithiasis    HISTORY OF PRESENT ILLNESS:   The patient is a 56 y.o. male with 7 mm left UVJ stone and a 5 mm left lower pole kidney stone. Here for definitive stone treatment with ureteroscopy    Patient's old records, notes and chart reviewed and summarized above. Past Medical History:    Past Medical History:   Diagnosis Date    Arthritis     left knee, and bilateral hands    Asthma     Back pain     H/O cardiac radiofrequency ablation January and 2015    Headache(784.0)     PVC (premature ventricular contraction)     Sleep apnea     no C-pap at this time. SVT (supraventricular tachycardia) (HCC)     Vertigo        Past Surgical History:    Past Surgical History:   Procedure Laterality Date    ABLATION OF DYSRHYTHMIC FOCUS  2016    pvc's /  DR Sims Deidre    ANKLE SURGERY Right     mole removed    CARDIAC SURGERY  2015    SVT ablation x2    CARPAL TUNNEL RELEASE Right 2018    CHOLECYSTECTOMY      KNEE ARTHROSCOPY Left     KNEE SURGERY Left     scraped arthritis off    ROTATOR CUFF REPAIR Left     ROTATOR CUFF REPAIR Left     TONSILLECTOMY      adnoids       Medications:    Scheduled Meds:  Continuous Infusions:  PRN Meds:.     Allergies:  Seasonal and Terbinafine    Social History:    Social History     Socioeconomic History    Marital status: Single     Spouse name: Not on file    Number of children: Not on file    Years of education: Not on file    Highest education level: Not on file   Occupational History    Occupation:    Tobacco Use    Smoking status: Former     Types: Cigarettes     Quit date: 2009     Years since quittin.2    Smokeless tobacco: Never    Tobacco comments:     quit 6 years ago   Vaping Use    Vaping Use: Never used   Substance and Sexual Activity    Alcohol use: Yes     Comment: rarely    Drug use: No    Sexual activity: Not on file   Other Topics Concern    Not on file   Social History Narrative    Not on file     Social Determinants of Health     Financial Resource Strain: Not on file   Food Insecurity: Not on file   Transportation Needs: Not on file   Physical Activity: Not on file   Stress: Not on file   Social Connections: Not on file   Intimate Partner Violence: Not on file   Housing Stability: Not on file       Family History:  No family history on file. REVIEW OF SYSTEMS:  14 point review of systems negative other than HPI      Physical Exam:    This a 56 y.o. male   No data found. Constitutional: Patient in no acute distress; Neuro: alert and oriented to person place and time. Psych: Mood and affect normal.  Skin: Normal  Lungs: Respiratory effort normal  Cardiovascular:  Normal peripheral pulses  Abdomen: Soft, non-tender, non-distended   Bladder non-tender and not distended. LE no edema/TTP    LABS:  No results for input(s): WBC, HGB, HCT, MCV, PLT in the last 72 hours. No results for input(s): NA, K, CL, CO2, PHOS, BUN, CREATININE, CA in the last 72 hours. No results found for: PSA    Additional Lab/culture results:  None    Urinalysis: No results for input(s): COLORU, PHUR, LABCAST, WBCUA, RBCUA, MUCUS, TRICHOMONAS, YEAST, BACTERIA, CLARITYU, SPECGRAV, LEUKOCYTESUR, UROBILINOGEN, BILIRUBINUR, BLOODU in the last 72 hours.     Invalid input(s): NITRATE, GLUCOSEUKETONESUAMORPHOUS     -----------------------------------------------------------------  Imaging Results:  None    Assessment and Plan   Impression:    Patient Active Problem List   Diagnosis    Spondylosis of lumbosacral region    Sacroiliitis (HCC)    Headache    Meningitis    Symptomatic bradycardia    Confusion    S/P ablation operation for arrhythmia    Retrograde amnesia    SIRS (systemic inflammatory response syndrome) (formerly [facility])    Phlebitis of superficial vein of upper extremity    Cellulitis of left arm    Cellulitis of right arm    Fever chills Migraine without aura and without status migrainosus, not intractable       Plan:   N.p.o.  Consent  Antibiotics  Ureteroscopy holmium laser lithotripsy cystoscopy stent placement left side      Chivo Wang MD  8:04 AM 9/28/2022

## 2022-09-28 NOTE — DISCHARGE INSTRUCTIONS
Ureteroscopy Discharge Instructions: Take prescriptions as directed, ensuring you take entire course of antibiotic. No driving while taking narcotic pain medication. Wean off narcotics as soon as able. OK to shower after discharge. May resume regular diet. No heavy lifting >10lbs day of procedure, avoid strenuous activity, may walk. You may have a string taped to your pelvis, genitalia, or inner thigh. Please take care while showering/wiping that you do not tug on the string and dislodge your indwelling stent early. OK to remove stent by pulling the string straight out in 4 days. Please call the office if you have difficulty removing your stent  You may see blood in the urine after the procedure and entire time stent is in place. This should resolve over the next couple days. Please stay hydrated. You may experience flank pain, and/or frequency/urgency of urination while the stent is in place. Please use Flomax (Tamsulosin) to help with these symptoms. Please call attending physician or hospital  with questions. Please call or present to ED for fever >101 F, intractable nausea and vomiting, or uncontrolled pain. Follow up with Dr. Cesar Bae in 6 weeks with renal ultrasound prior to appointment. Call office to confirm appointment. Pt should pull stent in the morning of 10/2. There may be some pain associated with the stent removal, which is usually self limiting. We suggest using the pain medication prescribed for you and a nonsteroidal anti-inflammatory such as Ibuprofen, if you are able to take this medication, to control symptoms. Take Ibuprofen as directed for 24 hrs after stent pull. Please stay hydrated. Please call with questions.

## 2022-09-29 NOTE — ANESTHESIA POSTPROCEDURE EVALUATION
Department of Anesthesiology  Postprocedure Note    Patient: Cassie Pro  MRN: [id number]  YOB: 1965  Date of evaluation: 9/29/2022      Procedure Summary     Date: 09/28/22 Room / Location: [address]    Anesthesia Start: 1712 Anesthesia Stop: 6319    Procedure: HOLMIUM, CYSTOSCOPY, URETEROSCOPY, STENT PLACEMENT (Left) Diagnosis:       Kidney stones      (LEFT KIDNEY STONE)    Surgeons: Abraham Conteh MD Responsible Provider: Ching Farris MD    Anesthesia Type: general ASA Status: 3          Anesthesia Type: No value filed.     Bam Phase I: Bam Score: 10    Bam Phase II: Bam Score: 10      Anesthesia Post Evaluation    Patient location during evaluation: PACU  Patient participation: complete - patient participated  Level of consciousness: awake  Pain score: 1  Airway patency: patent  Nausea & Vomiting: no nausea and no vomiting  Complications: no  Cardiovascular status: blood pressure returned to baseline and hemodynamically stable  Respiratory status: acceptable  Hydration status: euvolemic

## 2022-09-30 NOTE — OP NOTE
Operative Note      Patient: Rodo Thomas  YOB: 1965  MRN: [id number]    Date of Procedure: 9/28/2022    Pre-Op Diagnosis: LEFT KIDNEY STONE    Post-Op Diagnosis: Same    Procedures performed:  1. Cystoscopy  2. Left ureteroscopy  3. Holmium laser lithotripsy  4. Left ureteral stent exchange, 4.8 Swiss x30 cm    Surgeon(s):  Janis Mcginnis MD    Assistant:   Resident: Narendra Greenwood DO, PGY 3    Anesthesia: General    Estimated Blood Loss (mL): Minimal    Complications: None    Specimens: None    Implants:  Implant Name Type Inv. Item Serial No.  Lot No. LRB No. Used Action   STENT URET 48FR L30CM HYDR+ RADPQ W TAPR TIP PGTL BLDR MRK - [id number]  STENT URET 48FR L30CM HYDR+ RADPQ W TAPR TIP PGTL DR MRK  [employer] UROLOGY-WD  Left 1 Implanted         Drains: 4.8 Swiss x30 cm stent on the left with string    Findings: Left distal ureteral stone seen, approximately 7 mm-obliterated with holmium laser and fragments were flushed out of the ureter. 5 mm left lower pole kidney stone also obliterated with holmium laser into submillimeter fragments and flushed out of the lower pole and into the renal pelvis. Indications: This is a 29-year-old male who presented with left flank pain secondary to left obstructing UVJ stone. Patient also has a left 5 mm lower pole nonobstructing renal stone. He presented today for definitive stone treatment. Informed consent was obtained. Detailed Description of Procedure:   Patient was brought back to the operating room and placed on the table in supine position. Anesthesia was induced and preoperative antibiotics were given. He was transferred to dorsolithotomy position. He was prepped and draped in normal sterile fashion. Timeout was performed confirming 2 patient identifiers and all were in agreement. EPC cuffs were on and working. We first began by inserting a rigid cystoscope into the bladder.   Once at the bladder systemic cystoscopy was performed which revealed no bladder masses or lesions. We turned our attention to the left ureteral orifice and cannulated this with 2 wires with the use of a dual-lumen ureteral catheter. Over one of the wires a flexible ureteroscope was introduced and advanced into the distal ureter where there was encountered a 7 mm stone. Using holmium laser the stone was obliterated into submillimeter fragments and all fragments were flushed out of the ureter. The ureteroscope was then advanced into the left renal pelvis and complete pyeloscopy revealed a 5 mm stone in the lower pole. This was also obliterated with holmium laser and all fragments were flushed out of the lower pole and into the renal pelvis. A 4.8 Ghanaian by 30 cm stent was then placed in the left with good positioning confirmed on fluoroscopy. All instruments were removed from the bladder at the end of the procedure. String of the stent was affixed to the penile shaft with Mastisol and Steri-Strips. This concluded the procedure. Patient tolerated the procedure well. He was transferred to PACU in stable condition. Dr. Yessy Godfrey was present and available for the entire duration of the procedure. Plan: Patient will remove his stent in 1 week. He will follow-up in clinic thereafter.     Electronically signed by Tio Baird DO on 9/30/2022 at 12:14 PM

## 2022-10-11 ENCOUNTER — OFFICE VISIT (OUTPATIENT)
Dept: UROLOGY | Age: 57
End: 2022-10-11
Payer: COMMERCIAL

## 2022-10-11 VITALS
HEART RATE: 76 BPM | DIASTOLIC BLOOD PRESSURE: 97 MMHG | WEIGHT: 229 LBS | HEIGHT: 77 IN | BODY MASS INDEX: 27.04 KG/M2 | SYSTOLIC BLOOD PRESSURE: 147 MMHG

## 2022-10-11 DIAGNOSIS — N40.1 BENIGN PROSTATIC HYPERPLASIA WITH INCOMPLETE BLADDER EMPTYING: ICD-10-CM

## 2022-10-11 DIAGNOSIS — Z12.5 PROSTATE CANCER SCREENING: ICD-10-CM

## 2022-10-11 DIAGNOSIS — R39.14 BENIGN PROSTATIC HYPERPLASIA WITH INCOMPLETE BLADDER EMPTYING: ICD-10-CM

## 2022-10-11 DIAGNOSIS — N20.0 KIDNEY STONES: Primary | ICD-10-CM

## 2022-10-11 PROCEDURE — 51798 US URINE CAPACITY MEASURE: CPT | Performed by: NURSE PRACTITIONER

## 2022-10-11 PROCEDURE — 99214 OFFICE O/P EST MOD 30 MIN: CPT | Performed by: NURSE PRACTITIONER

## 2022-10-11 ASSESSMENT — ENCOUNTER SYMPTOMS
COUGH: 0
WHEEZING: 0
BACK PAIN: 0
GASTROINTESTINAL NEGATIVE: 1
EYE REDNESS: 0
VOMITING: 0
EYE PAIN: 0
DIARRHEA: 0
SHORTNESS OF BREATH: 0
EYES NEGATIVE: 1
NAUSEA: 0
CONSTIPATION: 0
RESPIRATORY NEGATIVE: 1
ABDOMINAL PAIN: 0

## 2022-10-11 NOTE — PROGRESS NOTES
[address] [address]  Dept: [address] [facility] Urology Office Note - Established    Patient:  Alma Cedeño  YOB: 1965  Date: 10/11/2022    The patient is a 56 y.o. male who presents todayfor evaluation of the following problems:   Chief Complaint   Patient presents with    Flank Pain     1 week f/u- cysto, left URS, HLL & stent placement       HPI  Patient is presenting for post-op follow-up. Underwent cysto, L URS, HLL and stent placement on 9/28/22 for a 7mm left distal ureteral stone and a 5mm left lower pole kidney stone. He is doing well in his post-op recovery. Does admit he had some issues urinating on Saturday- difficulty starting stream and emptying- so he pulled the stent a day earlier than he was told. Since pulling the stent, his urinary symptoms are back to baseline. He is on flomax chronically for BPH symptoms. Patient has passed some stone fragments and hematuria which is clearing. He denies dysuria, flank pain has resolved. He denies post-operative fever/chills, SOB/Chest pain, and leg pain/swelling. PSA was 0.67 in December 2022- states his PCP does prostate cancer screening. He will be due for repeat PSA in December 2023. I will place orders to ensure they are in his chart. He is uncertain of any fhx prostate cancer, as he was adopted. Summary of old records: N/A    Additional History: N/A    Procedures Today: N/A    Urinalysis today:  No results found for this visit on 10/11/22.   Last several PSA's:  No results found for: PSA  Last total testosterone:  No results found for: TESTOSTERONE    Last BUN and creatinine:  Lab Results   Component Value Date    BUN 16 04/06/2021     Lab Results   Component Value Date    CREATININE 1.21 (H) 09/28/2022       Additional Lab/Culture results: none    Imaging Reviewed during this Office Visit: none  (results were independently reviewed by physician and radiology report verified)    PAST MEDICAL, FAMILY AND SOCIAL HISTORY UPDATE:  Past Medical History:   Diagnosis Date    Arthritis     left knee, and bilateral hands    Asthma     Back pain     H/O cardiac radiofrequency ablation January and 2015    Headache(784.0)     PVC (premature ventricular contraction)     Sleep apnea     no C-pap at this time. SVT (supraventricular tachycardia) (HCC)     Vertigo      Past Surgical History:   Procedure Laterality Date    ABLATION OF DYSRHYTHMIC FOCUS  2016    pvc's /  DR Julián Calvo    ANKLE SURGERY Right     mole removed    CARDIAC SURGERY  2015    SVT ablation x2    CARPAL TUNNEL RELEASE Right 2018    CHOLECYSTECTOMY      KNEE ARTHROSCOPY Left     KNEE SURGERY Left     scraped arthritis off    ROTATOR CUFF REPAIR Left     ROTATOR CUFF REPAIR Left     TONSILLECTOMY      adnoids    URETER SURGERY Left 2022    HOLMIUM, CYSTOSCOPY, URETEROSCOPY, STENT PLACEMENT performed by Janis Mcginnis MD at [place] [id number]     No family history on file. No outpatient medications have been marked as taking for the 10/11/22 encounter (Office Visit) with ALLIE Frederick CNP. Seasonal and Terbinafine  Social History     Tobacco Use   Smoking Status Former    Types: Cigarettes    Quit date: 2009    Years since quittin.2   Smokeless Tobacco Never   Tobacco Comments    quit 6 years ago     (Ifpatient a smoker, smoking cessation counseling offered)    Social History     Substance and Sexual Activity   Alcohol Use Yes    Comment: rarely       REVIEW OF SYSTEMS:  Review of Systems    Physical Exam:      Vitals:    10/11/22 0901   BP: (!) 147/97   Pulse: 76     Body mass index is 27.51 kg/m². Patient is a 56 y.o. male in no acute distress and alert and oriented to person, place and time. Physical Exam  Constitutional: Patient in no acute distress. Neuro: Alert and oriented to person, place and time.   Psych: Mood normal, affect normal  Skin: No rash noted  Lungs: Respiratory effort is normal  Cardiovascular: Warm & Pink  Abdomen: Soft, non-tender, non-distended with no CVA,  No flank tenderness  Bladder non-tender and not distended. PVR 55mL  Musculoskeletal: Normal gait and station    Assessment and Plan      1. Kidney stones    2. Prostate cancer screening    3. Benign prostatic hyperplasia with incomplete bladder emptying           Plan:   S/P URS, HLL, and stent placement- he removed stent at home. Update renal US now- we can discuss results on telephone visit. Stone prevention reviewed. Stop Levsin  Continue flomax for BPH symptoms- PVR is 55cc  PSA due in December. Otherwise f/u 6 mos with KUB or sooner if needed. Return in about 2 weeks (around 10/25/2022) for Renal US results VV and 6 mos KUB. Prescriptions Ordered:  No orders of the defined types were placed in this encounter. Orders Placed:  Orders Placed This Encounter   Procedures    US RENAL COMPLETE     This procedure can be scheduled via [facility]. Access your [facility] account by visiting [email or web address]. Standing Status:   Future     Standing Expiration Date:   10/11/2023     Order Specific Question:   Reason for exam:     Answer:   s/p stone treatment. XR ABDOMEN (KUB) (SINGLE AP VIEW)     Standing Status:   Future     Standing Expiration Date:   10/11/2023    PSA Screening     Standing Status:   Future     Standing Expiration Date:   4/11/2024    GA MEASUREMENT,POST-VOID RESIDUAL VOLUME BY US,NON-IMAGING           ALLIE Nguyen CNP    Reviewed and agree with the ROS entered by the MA.

## 2022-10-17 ENCOUNTER — HOSPITAL ENCOUNTER (OUTPATIENT)
Dept: ULTRASOUND IMAGING | Age: 57
Discharge: HOME | End: 2022-10-19
Payer: COMMERCIAL

## 2022-10-17 DIAGNOSIS — N20.0 KIDNEY STONES: ICD-10-CM

## 2022-10-17 PROCEDURE — 76770 US EXAM ABDO BACK WALL COMP: CPT

## 2022-10-25 ENCOUNTER — SCHEDULED TELEPHONE ENCOUNTER (OUTPATIENT)
Dept: UROLOGY | Age: 57
End: 2022-10-25
Payer: COMMERCIAL

## 2022-10-25 DIAGNOSIS — N20.0 KIDNEY STONE: Primary | ICD-10-CM

## 2022-10-25 DIAGNOSIS — R39.14 BENIGN PROSTATIC HYPERPLASIA WITH INCOMPLETE BLADDER EMPTYING: ICD-10-CM

## 2022-10-25 DIAGNOSIS — N40.1 BENIGN PROSTATIC HYPERPLASIA WITH INCOMPLETE BLADDER EMPTYING: ICD-10-CM

## 2022-10-25 PROCEDURE — 99442 PR PHYS/QHP TELEPHONE EVALUATION 11-20 MIN: CPT | Performed by: NURSE PRACTITIONER

## 2022-10-25 ASSESSMENT — ENCOUNTER SYMPTOMS
EYE REDNESS: 0
ABDOMINAL PAIN: 0
VOMITING: 0
GASTROINTESTINAL NEGATIVE: 1
COUGH: 0
WHEEZING: 0
NAUSEA: 0
EYE PAIN: 0
EYES NEGATIVE: 1
SHORTNESS OF BREATH: 0
RESPIRATORY NEGATIVE: 1
CONSTIPATION: 0
DIARRHEA: 0
BACK PAIN: 0

## 2022-10-25 NOTE — PROGRESS NOTES
Naren Pacheco is a 56 y.o. male evaluated via telephone on 10/25/2022 for Nephrolithiasis (2 week with US)  . Subjective   Prior to Visit Medications    Medication Sig Taking? Authorizing Provider   tamsulosin (FLOMAX) 0.4 MG capsule Take 1 capsule by mouth daily  Renetta Aguayo, DO   ALBUTEROL IN Inhale 2 puffs into the lungs every 12 hours as needed  Historical Provider, MD   lisinopril-hydroCHLOROthiazide (PRINZIDE;ZESTORETIC) 20-12.5 MG per tablet Take 1 tablet by mouth daily  Historical Provider, MD   tamsulosin (FLOMAX) 0.4 MG capsule TAKE 1 CAPSULE (0.4 MG TOTAL) BY MOUTH IN THE MORNING. Historical Provider, MD   naproxen sodium (ANAPROX) 550 MG tablet TAKE 1 TABLET BY MOUTH 2 TIMES DAILY AS NEEDED FOR PAIN (HEADACHE)  Real Casas MD   rizatriptan (MAXALT) 10 MG tablet PLEASE SEE ATTACHED FOR DETAILED DIRECTIONS  Real Casas MD   rOPINIRole (REQUIP) 0.5 MG tablet Take 0.5 mg by mouth 3 times daily  Historical Provider, MD   topiramate (TOPAMAX) 50 MG tablet Take 1 tablet by mouth 2 times daily  Moodybrian Leung MD   amitriptyline (ELAVIL) 25 MG tablet Take 1 tablet by mouth nightly  Moodybrian Leung MD   buPROPion (WELLBUTRIN XL) 150 MG XL tablet Take 150 mg by mouth every morning  Historical Provider, MD   pregabalin (LYRICA) 100 MG capsule Take 100 mg by mouth 3 times daily  Historical Provider, MD   eszopiclone (LUNESTA) 1 MG TABS Take 1 mg by mouth nightly as needed   Historical Provider, MD   tiZANidine (ZANAFLEX) 4 MG tablet Take 4 mg by mouth every 8 hours as needed (spasms)   Historical Provider, MD NARANJO  Patient is presenting via VV (telephone encounter only) to review renal US. Had cysto, L URS, HLL and stent placement about 1 month ago for a 7mm distal ureteral stone and 5mm left lower pole stone. He removed the stent himself. He did repeat a renal us which shows 6mm non-obstructing stone in lower left kidney- there is no hydro noted. Mr. Jyoti Keys denies flank pain, hematuria, fever or chills. No n/v, and no appetite change. His urinary symptoms are back to baseline and continues his flomax daily for BPH symptoms. He denies any further concerns for today's visit. Narrative   EXAMINATION:   RETROPERITONEAL ULTRASOUND OF THE KIDNEYS AND URINARY BLADDER       10/17/2022       COMPARISON:   None       HISTORY:   ORDERING SYSTEM PROVIDED HISTORY: Kidney stones   TECHNOLOGIST PROVIDED HISTORY:   This procedure can be scheduled via [email or web address]. Access your [email or web address] account by   visiting [employer].   s/p stone treatment. FINDINGS:       Kidneys: The right kidney measures 11 cm in length and the left kidney measures 10.4   cm in length. Normal renal cortical echogenicity. 6 mm nonobstructing stone inferior left   kidney. No hydronephrosis or right nephrolithiasis. Bladder:       Unremarkable appearance of the bladder. No significant post void residual.           Impression   Nonobstructing left nephrolithiasis with otherwise unremarkable exam.           Summary of old records:  Patient is presenting for post-op follow-up. Underwent cysto, L URS, HLL and stent placement on 9/28/22 for a 7mm left distal ureteral stone and a 5mm left lower pole kidney stone. He is doing well in his post-op recovery. Does admit he had some issues urinating on Saturday- difficulty starting stream and emptying- so he pulled the stent a day earlier than he was told. Since pulling the stent, his urinary symptoms are back to baseline. He is on flomax chronically for BPH symptoms. Patient has passed some stone fragments and hematuria which is clearing. He denies dysuria, flank pain has resolved. He denies post-operative fever/chills, SOB/Chest pain, and leg pain/swelling. PSA was 0.67 in December 2022- states his PCP does prostate cancer screening. He will be due for repeat PSA in December 2023. I will place orders to ensure they are in his chart.   He is uncertain of any fhx prostate cancer, as he was adopted. Review of Systems  - reviewed and agree with ROS entered by MA. Objective   Patient-Reported Vitals  BP Observations: No, remote/electronic monitoring device was not used or able to be verified  Patient-Reported Weight: 229 lb  Patient-Reported Height: 6 4.5         Assessment & Plan   1. Kidney stone  - has cysto L URS, HLL and stent placement for left distal ureteral stone that was obstructing.   - his repeat renal us shows non-obstructing stone to left kidney- 6mm, asymptomatic.    - discussed surveillance vs. Obtaining KUB. He prefers surveillance at this time. - will obtain KUB in 6 mos   - discussed stone prevention. 2. Benign prostatic hyperplasia with incomplete bladder emptying   - does well with flomax, will continue. Denies need for refills. - PCP managing PSA. Patient to call with any new or worsening  symptoms, questions or concerns. Return in about 6 months (around 4/25/2023) for KUB . Total Time: minutes: 11-20 minutes     Pal Memory was evaluated through a synchronous (real-time) audio only encounter. Patient identification was verified at the start of the visit. He (or guardian if applicable) is aware that this is a billable service, which includes applicable co-pays. This visit was conducted with patient's (and/or legal guardian's) verbal consent. He has not had a related appointment within my department in the past 7 days or scheduled within the next 24 hours. The patient was located at Home: [address]. The provider was located at [facility] (Appt Dept): [facility] [address]  [address] [facility].      ALLIE James CNP

## 2023-03-08 ENCOUNTER — TELEPHONE (OUTPATIENT)
Dept: FAMILY MEDICINE CLINIC | Age: 58
End: 2023-03-08

## 2023-03-08 NOTE — TELEPHONE ENCOUNTER
----- Message from Jazmín Falcon sent at 3/8/2023 11:34 AM EST -----  Subject: Appointment Request    Reason for Call: New Patient/New to Provider Appointment needed: New   Patient Request Appointment    QUESTIONS    Reason for appointment request? No appointments available during search     Additional Information for Provider? Patient is hoping to get established   with Provider Rafiq Major if at all possible.  Please call and let patient know   if any new patient appointments will be coming available.  ---------------------------------------------------------------------------  --------------  [address] [employer]  [phone]; OK to leave message on voicemail  ---------------------------------------------------------------------------  --------------  SCRIPT ANSWERS  JUAN Screen: Veronica Tellez

## 2023-03-13 ENCOUNTER — OFFICE VISIT (OUTPATIENT)
Dept: INTERNAL MEDICINE CLINIC | Age: 58
End: 2023-03-13
Payer: COMMERCIAL

## 2023-03-13 VITALS
OXYGEN SATURATION: 98 % | WEIGHT: 244 LBS | BODY MASS INDEX: 28.81 KG/M2 | HEIGHT: 77 IN | DIASTOLIC BLOOD PRESSURE: 84 MMHG | SYSTOLIC BLOOD PRESSURE: 132 MMHG | HEART RATE: 93 BPM

## 2023-03-13 DIAGNOSIS — M25.551 CHRONIC PAIN OF RIGHT HIP: ICD-10-CM

## 2023-03-13 DIAGNOSIS — Z13.1 ENCOUNTER FOR SCREENING FOR DIABETES MELLITUS: ICD-10-CM

## 2023-03-13 DIAGNOSIS — G89.29 CHRONIC PAIN OF RIGHT HIP: ICD-10-CM

## 2023-03-13 DIAGNOSIS — G89.29 CHRONIC PAIN OF BOTH KNEES: Primary | ICD-10-CM

## 2023-03-13 DIAGNOSIS — M25.561 CHRONIC PAIN OF BOTH KNEES: Primary | ICD-10-CM

## 2023-03-13 DIAGNOSIS — H81.13 BENIGN PAROXYSMAL POSITIONAL VERTIGO DUE TO BILATERAL VESTIBULAR DISORDER: ICD-10-CM

## 2023-03-13 DIAGNOSIS — M25.562 CHRONIC PAIN OF BOTH KNEES: Primary | ICD-10-CM

## 2023-03-13 DIAGNOSIS — Z12.11 COLON CANCER SCREENING: ICD-10-CM

## 2023-03-13 PROCEDURE — 99203 OFFICE O/P NEW LOW 30 MIN: CPT | Performed by: INTERNAL MEDICINE

## 2023-03-13 SDOH — ECONOMIC STABILITY: FOOD INSECURITY: WITHIN THE PAST 12 MONTHS, YOU WORRIED THAT YOUR FOOD WOULD RUN OUT BEFORE YOU GOT MONEY TO BUY MORE.: NEVER TRUE

## 2023-03-13 SDOH — ECONOMIC STABILITY: INCOME INSECURITY: HOW HARD IS IT FOR YOU TO PAY FOR THE VERY BASICS LIKE FOOD, HOUSING, MEDICAL CARE, AND HEATING?: NOT HARD AT ALL

## 2023-03-13 SDOH — ECONOMIC STABILITY: HOUSING INSECURITY
IN THE LAST 12 MONTHS, WAS THERE A TIME WHEN YOU DID NOT HAVE A STEADY PLACE TO SLEEP OR SLEPT IN A SHELTER (INCLUDING NOW)?: NO

## 2023-03-13 SDOH — ECONOMIC STABILITY: FOOD INSECURITY: WITHIN THE PAST 12 MONTHS, THE FOOD YOU BOUGHT JUST DIDN'T LAST AND YOU DIDN'T HAVE MONEY TO GET MORE.: NEVER TRUE

## 2023-03-13 ASSESSMENT — PATIENT HEALTH QUESTIONNAIRE - PHQ9
1. LITTLE INTEREST OR PLEASURE IN DOING THINGS: 0
SUM OF ALL RESPONSES TO PHQ QUESTIONS 1-9: 0
SUM OF ALL RESPONSES TO PHQ QUESTIONS 1-9: 0
2. FEELING DOWN, DEPRESSED OR HOPELESS: 0
SUM OF ALL RESPONSES TO PHQ QUESTIONS 1-9: 0
SUM OF ALL RESPONSES TO PHQ QUESTIONS 1-9: 0
SUM OF ALL RESPONSES TO PHQ9 QUESTIONS 1 & 2: 0

## 2023-03-13 NOTE — PROGRESS NOTES
Visit Information    Have you changed or started any medications since your last visit including any over-the-counter medicines, vitamins, or herbal medicines? no   Are you having any side effects from any of your medications? -  no  Have you stopped taking any of your medications? Is so, why? -  no    Have you seen any other physician or provider since your last visit? No  Have you had any other diagnostic tests since your last visit? No  Have you been seen in the emergency room and/or had an admission to a hospital since we last saw you? No  Have you had your routine dental cleaning in the past 6 months? no    Have you activated your [employer] account? If not, what are your barriers?  Yes     Patient Care Team:  Alisa Garcia MD as PCP - General (Internal Medicine)  Adelaida Evans MD as Consulting Physician (Urology)    Medical History Review  Past Medical, Family, and Social History reviewed and does contribute to the patient presenting condition    Health Maintenance   Topic Date Due    COVID-19 Vaccine (1) Never done    Depression Screen  Never done    Hepatitis C screen  Never done    Diabetes screen  Never done    Colorectal Cancer Screen  Never done    Shingles vaccine (1 of 2) Never done    Flu vaccine (1) 08/01/2022    Lipids  01/18/2026    DTaP/Tdap/Td vaccine (2 - Td or Tdap) 11/26/2027    HIV screen  Completed    Hepatitis A vaccine  Aged Out    Hib vaccine  Aged Out    Meningococcal (ACWY) vaccine  Aged Out    Pneumococcal 0-64 years Vaccine  Aged Out

## 2023-03-13 NOTE — PROGRESS NOTES
[address] [address]  Dept: [phone]  Dept Fax: [phone]    Chiqui Barnes is a 66 y.o. male who presents today for his medicalconditions/complaints as noted below. Chiqui Barnes is c/o of New Patient, Dizziness, Joint Pain, Fatigue, and Migraine (Sees neurologist /)      HPI:     Dizziness  This is a chronic (since august 2022) problem. The current episode started more than 1 month ago. Associated symptoms include arthralgias, fatigue and vertigo. He has tried position changes (receiving evaluation in [facility] but he feels he felt better from therapy he got in [facility]) for the symptoms. The treatment provided moderate relief. Joint Pain   The pain is present in the right elbow, left knee, right knee, right hip and neck. This is a chronic problem. The current episode started more than 1 year ago. The quality of the pain is described as aching. The symptoms are aggravated by activity and standing. He has tried NSAIDS for the symptoms. The treatment provided mild relief. had arthroscopy of knee ~ 8 years ago   Fatigue  Associated symptoms include arthralgias, fatigue and vertigo. Migraine   This is a chronic problem. The current episode started more than 1 year ago. The problem occurs intermittently. Associated symptoms include dizziness. He has tried triptans (sees neurologist on [facility]) for the symptoms. The treatment provided significant relief. Irregular Heart Beat   This is a chronic problem. The current episode started more than 1 year ago. The problem has been waxing and waning. Associated symptoms include dizziness and an irregular heartbeat. He has tried antiarrhythmics (has cardiologist) for the symptoms.  His past medical history is significant for heart disease. had arthroscopy of knee ~ 8 years ago     Past Medical History:   Diagnosis Date    Arthritis     left knee, and bilateral hands    Asthma     Back pain     H/O cardiac radiofrequency ablation January and March 2015    Headache(784.0)     PVC (premature ventricular contraction)     Sleep apnea     no C-pap at this time. SVT (supraventricular tachycardia) (HCC)     Vertigo         Current Outpatient Medications   Medication Sig Dispense Refill    tamsulosin (FLOMAX) 0.4 MG capsule Take 1 capsule by mouth daily 30 capsule 0    ALBUTEROL IN Inhale 2 puffs into the lungs every 12 hours as needed      lisinopril-hydroCHLOROthiazide (PRINZIDE;ZESTORETIC) 20-12.5 MG per tablet Take 1 tablet by mouth daily      tamsulosin (FLOMAX) 0.4 MG capsule TAKE 1 CAPSULE (0.4 MG TOTAL) BY MOUTH IN THE MORNING.      naproxen sodium (ANAPROX) 550 MG tablet TAKE 1 TABLET BY MOUTH 2 TIMES DAILY AS NEEDED FOR PAIN (HEADACHE) 14 tablet 3    rizatriptan (MAXALT) 10 MG tablet PLEASE SEE ATTACHED FOR DETAILED DIRECTIONS 6 tablet 1    rOPINIRole (REQUIP) 0.5 MG tablet Take 0.5 mg by mouth 3 times daily      topiramate (TOPAMAX) 50 MG tablet Take 1 tablet by mouth 2 times daily 60 tablet 5    amitriptyline (ELAVIL) 25 MG tablet Take 1 tablet by mouth nightly 90 tablet 2    buPROPion (WELLBUTRIN XL) 150 MG XL tablet Take 150 mg by mouth every morning      pregabalin (LYRICA) 100 MG capsule Take 100 mg by mouth 3 times daily      eszopiclone (LUNESTA) 1 MG TABS Take 1 mg by mouth nightly as needed   0    tiZANidine (ZANAFLEX) 4 MG tablet Take 4 mg by mouth every 8 hours as needed (spasms)   0     No current facility-administered medications for this visit.      Allergies   Allergen Reactions    Seasonal Itching, Dermatitis and Rash    Terbinafine Rash     Other reaction(s): bleeding skin, peeling cracking  Other reaction(s): bleeding skin, peeling cracking         Health Maintenance   Topic Date Due    COVID-19 Vaccine (1) Never done    Depression Screen  Never done    Hepatitis C screen  Never done    Diabetes screen  Never done    Colorectal Cancer Screen  Never done    Shingles vaccine (1 of 2) Never done    Flu vaccine (1) 08/01/2022    Lipids  01/18/2026    DTaP/Tdap/Td vaccine (2 - Td or Tdap) 11/26/2027    HIV screen  Completed    Hepatitis A vaccine  Aged Out    Hib vaccine  Aged Out    Meningococcal (ACWY) vaccine  Aged Out    Pneumococcal 0-64 years Vaccine  Aged Out       Subjective:      Review of Systems   Constitutional:  Positive for fatigue. Musculoskeletal:  Positive for arthralgias. Neurological:  Positive for dizziness and vertigo. All other systems reviewed and are negative. Objective:     Physical Exam  Vitals reviewed. Constitutional:       Appearance: He is well-developed. HENT:      Head: Normocephalic and atraumatic. Eyes:      Conjunctiva/sclera: Conjunctivae normal.      Pupils: Pupils are equal, round, and reactive to light. Neck:      Thyroid: No thyromegaly. Vascular: No JVD. Cardiovascular:      Rate and Rhythm: Normal rate and regular rhythm. Heart sounds: Normal heart sounds. No murmur heard. Pulmonary:      Effort: Pulmonary effort is normal.      Breath sounds: Normal breath sounds. Abdominal:      General: Bowel sounds are normal.      Palpations: Abdomen is soft. Musculoskeletal:         General: Normal range of motion. Cervical back: Normal range of motion and neck supple. Right hip: Tenderness present. Right knee: Swelling present. Tenderness present. Left knee: Swelling present. Tenderness present. Skin:     General: Skin is warm and dry. Neurological:      Mental Status: He is alert and oriented to person, place, and time. Deep Tendon Reflexes: Reflexes are normal and symmetric. /84   Pulse 93   Ht 6' 4.5\" (1.943 m)   Wt 244 lb (110.7 kg)   SpO2 98%   BMI 29.31 kg/m²       Assessment:       Diagnosis Orders   1.  Chronic pain of both knees  Christ Forman MD, Orthopedic Surgery, [place]    XR KNEE RIGHT (3 VIEWS)    XR KNEE LEFT (3 VIEWS)      2. Encounter for screening for diabetes mellitus Glucose, Fasting      3. Chronic pain of right hip  [facility], Romana Krabbe, MD, Orthopedic Surgery, [place]    XR HIP RIGHT (2-3 VIEWS)      4. Colon cancer screening  FIT-DNA (Cologuard)      5. Benign paroxysmal positional vertigo due to bilateral vestibular disorder            Plan:      Return in about 3 months (around 6/13/2023). No orders of the defined types were placed in this encounter. Orders Placed This Encounter   Procedures    FIT-DNA (Cologuard)    XR HIP RIGHT (2-3 VIEWS)     Standing Status:   Future     Standing Expiration Date:   3/13/2024    XR KNEE RIGHT (3 VIEWS)     Standing Status:   Future     Standing Expiration Date:   3/13/2024    XR KNEE LEFT (3 VIEWS)     Standing Status:   Future     Standing Expiration Date:   3/13/2024    Glucose, Fasting     Standing Status:   Future     Standing Expiration Date:   3/13/2024    Yosi Etienne MD, Orthopedic Surgery, [place]     Referral Priority:   Routine     Referral Type:   Eval and Treat     Referral Reason:   Specialty Services Required     Referred to Provider:   Rangel Vasquze MD     Requested Specialty:   Orthopedic Surgery     Number of Visits Requested:   1              Patient given educational materials - see patient instructions. Discussed use, benefit, and side effects of prescribed medications. All patientquestions answered. Pt voiced understanding.     Electronically signed by Stanislav Hardy MD on 3/13/2023at 1:08 PM

## 2023-03-14 ENCOUNTER — HOSPITAL ENCOUNTER (OUTPATIENT)
Dept: GENERAL RADIOLOGY | Facility: CLINIC | Age: 58
Discharge: HOME | End: 2023-03-16
Payer: COMMERCIAL

## 2023-03-14 ENCOUNTER — HOSPITAL ENCOUNTER (OUTPATIENT)
Age: 58
Setting detail: SPECIMEN
Discharge: HOME | End: 2023-03-14

## 2023-03-14 ENCOUNTER — HOSPITAL ENCOUNTER (OUTPATIENT)
Facility: CLINIC | Age: 58
Discharge: HOME | End: 2023-03-16
Payer: COMMERCIAL

## 2023-03-14 DIAGNOSIS — M25.551 CHRONIC PAIN OF RIGHT HIP: ICD-10-CM

## 2023-03-14 DIAGNOSIS — G89.29 CHRONIC PAIN OF BOTH KNEES: ICD-10-CM

## 2023-03-14 DIAGNOSIS — M25.561 CHRONIC PAIN OF BOTH KNEES: ICD-10-CM

## 2023-03-14 DIAGNOSIS — Z13.1 ENCOUNTER FOR SCREENING FOR DIABETES MELLITUS: ICD-10-CM

## 2023-03-14 DIAGNOSIS — M25.562 CHRONIC PAIN OF BOTH KNEES: ICD-10-CM

## 2023-03-14 DIAGNOSIS — G89.29 CHRONIC PAIN OF RIGHT HIP: ICD-10-CM

## 2023-03-14 PROCEDURE — 73562 X-RAY EXAM OF KNEE 3: CPT

## 2023-03-14 PROCEDURE — 73502 X-RAY EXAM HIP UNI 2-3 VIEWS: CPT

## 2023-03-15 DIAGNOSIS — M25.562 CHRONIC PAIN OF BOTH KNEES: ICD-10-CM

## 2023-03-15 DIAGNOSIS — M25.561 CHRONIC PAIN OF BOTH KNEES: ICD-10-CM

## 2023-03-15 DIAGNOSIS — G89.29 CHRONIC PAIN OF RIGHT HIP: Primary | ICD-10-CM

## 2023-03-15 DIAGNOSIS — M25.551 CHRONIC PAIN OF RIGHT HIP: Primary | ICD-10-CM

## 2023-03-15 DIAGNOSIS — G89.29 CHRONIC PAIN OF BOTH KNEES: ICD-10-CM

## 2023-03-15 LAB — GLUCOSE SERPL-MCNC: 105 MG/DL (ref 70–99)

## 2023-03-22 ENCOUNTER — TELEPHONE (OUTPATIENT)
Dept: ORTHOPEDIC SURGERY | Age: 58
End: 2023-03-22

## 2023-03-28 ENCOUNTER — OFFICE VISIT (OUTPATIENT)
Dept: INTERNAL MEDICINE CLINIC | Age: 58
End: 2023-03-28
Payer: COMMERCIAL

## 2023-03-28 VITALS
DIASTOLIC BLOOD PRESSURE: 88 MMHG | OXYGEN SATURATION: 95 % | BODY MASS INDEX: 29.31 KG/M2 | HEIGHT: 77 IN | SYSTOLIC BLOOD PRESSURE: 130 MMHG | HEART RATE: 76 BPM

## 2023-03-28 DIAGNOSIS — R42 VERTIGO: Primary | ICD-10-CM

## 2023-03-28 PROCEDURE — 99213 OFFICE O/P EST LOW 20 MIN: CPT | Performed by: NURSE PRACTITIONER

## 2023-03-28 NOTE — LETTER
March 28, 2023       Noé Velazquez YOB: 1965   [address] Date of Visit:  3/28/2023       To Whom It May Concern:    Kimberlee Park was seen in my clinic on 3/28/2023. He has multiple appointments scheduled in the upcoming weeks for further evaluation of his medical condition. He may return to work on 5/26 pending resolution of symptoms. If you have any questions or concerns, please don't hesitate to call.     Sincerely,        ALLIE Vivar - CNP

## 2023-03-28 NOTE — PROGRESS NOTES
04/06/2021 10:07 PM    CO2 20 04/06/2021 10:07 PM    BUN 16 04/06/2021 10:07 PM    CREATININE 1.21 09/28/2022 02:23 PM    CREATININE 0.95 04/06/2021 10:07 PM    GLUCOSE 128 04/06/2021 10:07 PM     HEMOGLOBIN A1C: No results found for: LABA1C  FASTING LIPID PANEL:   Lab Results   Component Value Date    CHOL 178 07/03/2019    HDL 35 (L) 07/03/2019    LDLCHOLESTEROL 115 07/03/2019    TRIG 139 07/03/2019       ASSESSMENT AND PLAN:      Visit Diagnoses and Associated Orders       Vertigo    -  Primary    Patient with scheduled follow up with vestibular specialist, work note completed                  FOLLOW UP AND INSTRUCTIONS:     Return for routine follow up at next scheduled appointment. Julien received counseling on the following healthy behaviors: exercise     All patient questions answered. Pt voiced understanding. ALLIE Blake - CNP    3/29/2023, 11:01 AM    Please note that this chart was generated using voice recognition Dragon dictation software. Although every effort was made to ensure the accuracy of this automatedtranscription, some errors in transcription may have occurred.

## 2023-03-28 NOTE — Clinical Note
March 28, 2023       Melissa Rojas YOB: 1965   [address] Date of Visit:  3/28/2023       To Whom It May Concern: It is my medical opinion that Rosalie Moura {Work release (duty restriction):06689}. If you have any questions or concerns, please don't hesitate to call.     Sincerely,        Radha Seals, APRN - CNP

## 2023-03-28 NOTE — LETTER
March 28, 2023       Leandro Ada YOB: 1965   [address] Date of Visit:  3/28/2023       To Whom It May Concern:    Kathya Crocker was seen in my clinic on 3/28/2023. He may return to work on 5/26 pending resolution of symptoms    If you have any questions or concerns, please don't hesitate to call.     Sincerely,        Shayna Ontiveros, ALLIE - CNP

## 2023-03-29 ASSESSMENT — ENCOUNTER SYMPTOMS
CONSTIPATION: 0
ABDOMINAL PAIN: 0
SHORTNESS OF BREATH: 0
WHEEZING: 0
COUGH: 0

## 2023-04-19 ENCOUNTER — HOSPITAL ENCOUNTER (OUTPATIENT)
Dept: GENERAL RADIOLOGY | Age: 58
Discharge: HOME | End: 2023-04-21
Payer: COMMERCIAL

## 2023-04-19 ENCOUNTER — HOSPITAL ENCOUNTER (OUTPATIENT)
Age: 58
Discharge: HOME | End: 2023-04-21
Payer: COMMERCIAL

## 2023-04-19 DIAGNOSIS — N20.0 KIDNEY STONES: ICD-10-CM

## 2023-04-19 PROCEDURE — 74018 RADEX ABDOMEN 1 VIEW: CPT

## 2023-04-27 ENCOUNTER — OFFICE VISIT (OUTPATIENT)
Dept: UROLOGY | Age: 58
End: 2023-04-27
Payer: COMMERCIAL

## 2023-04-27 VITALS — HEIGHT: 77 IN | BODY MASS INDEX: 28.81 KG/M2 | WEIGHT: 244 LBS

## 2023-04-27 DIAGNOSIS — R39.14 BENIGN PROSTATIC HYPERPLASIA WITH INCOMPLETE BLADDER EMPTYING: ICD-10-CM

## 2023-04-27 DIAGNOSIS — N40.1 BENIGN PROSTATIC HYPERPLASIA WITH INCOMPLETE BLADDER EMPTYING: ICD-10-CM

## 2023-04-27 DIAGNOSIS — N20.0 KIDNEY STONE: Primary | ICD-10-CM

## 2023-04-27 PROCEDURE — 99213 OFFICE O/P EST LOW 20 MIN: CPT | Performed by: UROLOGY

## 2023-04-27 ASSESSMENT — ENCOUNTER SYMPTOMS
SHORTNESS OF BREATH: 0
EYE PAIN: 0
EYES NEGATIVE: 1
GASTROINTESTINAL NEGATIVE: 1
BACK PAIN: 0
COUGH: 0
ABDOMINAL PAIN: 0
CONSTIPATION: 0
DIARRHEA: 0
RESPIRATORY NEGATIVE: 1
VOMITING: 0
WHEEZING: 0
NAUSEA: 0
EYE REDNESS: 0

## 2023-04-27 NOTE — PROGRESS NOTES
[address] [address]  Dept:  [facility] Urology Office Note - Established    Patient:  Fiorella Pro  YOB: 1965  Date: 4/27/2023    The patient is a 66 y.o. male who presents todayfor evaluation of the following problems:   Chief Complaint   Patient presents with    Nephrolithiasis     6 month with KUB       HPI  Here for stones, doing ok, had stone surgery in past. Kub reviewed with pt and shows small stone right lower pole . Urinating ok, he is on tamsulosin for bph    Summary of old records: N/A    Additional History: N/A    Procedures Today: N/A    Urinalysis today:  No results found for this visit on 04/27/23. Last several PSA's:  No results found for: PSA  Last total testosterone:  No results found for: TESTOSTERONE    AUA Symptom Score (4/27/2023): Last BUN and creatinine:  Lab Results   Component Value Date    BUN 16 04/06/2021     Lab Results   Component Value Date    CREATININE 1.21 (H) 09/28/2022       Additional Lab/Culture results: none    Imaging Reviewed during this Office Visit: none  (results were independently reviewed by physician and radiology report verified)    PAST MEDICAL, FAMILY AND SOCIAL HISTORY UPDATE:  Past Medical History:   Diagnosis Date    Arthritis     left knee, and bilateral hands    Asthma     Back pain     H/O cardiac radiofrequency ablation January and March 2015    Headache(784.0)     PVC (premature ventricular contraction)     Sleep apnea     no C-pap at this time.     SVT (supraventricular tachycardia) (HCC)     Vertigo      Past Surgical History:   Procedure Laterality Date    ABLATION OF DYSRHYTHMIC FOCUS  08/06/2016    pvc's /  DR Berenice Vines    ANKLE SURGERY Right     mole removed    CARDIAC SURGERY  01/26/2015    SVT ablation x2    CARPAL TUNNEL RELEASE Right 08/2018    CHOLECYSTECTOMY      KNEE

## 2023-05-25 ENCOUNTER — OFFICE VISIT (OUTPATIENT)
Dept: INTERNAL MEDICINE CLINIC | Age: 58
End: 2023-05-25
Payer: COMMERCIAL

## 2023-05-25 VITALS
DIASTOLIC BLOOD PRESSURE: 92 MMHG | WEIGHT: 246 LBS | SYSTOLIC BLOOD PRESSURE: 130 MMHG | OXYGEN SATURATION: 95 % | HEART RATE: 74 BPM | BODY MASS INDEX: 29.55 KG/M2

## 2023-05-25 DIAGNOSIS — R42 VERTIGO, CONSTANT: Primary | ICD-10-CM

## 2023-05-25 PROCEDURE — 99212 OFFICE O/P EST SF 10 MIN: CPT | Performed by: INTERNAL MEDICINE

## 2023-05-25 NOTE — PROGRESS NOTES
[address] [address]  Dept: [phone]  Dept Fax: [phone]    Oralia Rico is a 66 y.o. male who presents today for his medicalconditions/complaints as noted below. Barton Current is c/o of Dizziness (Vertigo with headaches and ringing in ears since August)      HPI:     Dizziness  This is a chronic problem. The current episode started more than 1 month ago (since August). Associated symptoms include vertigo. The symptoms are aggravated by standing. He has tried immobilization for the symptoms. Migraine   This is a chronic (often brought on by the vertigo) problem. The current episode started more than 1 month ago. The problem occurs intermittently. The pain is located in the Bilateral region. The pain does not radiate. Associated symptoms include dizziness. Exacerbated by: worse when the vertigo is worse. He has tried triptans for the symptoms. Has received vestibular therapy through [facility] and has had appointments with vestibular specialist, Dr Emily Golden. Has a f/u coming up. He has not gotten any significant improvement    Also having problems with his knees seeing ortho for this. Past Medical History:   Diagnosis Date    Arthritis     left knee, and bilateral hands    Asthma     Back pain     H/O cardiac radiofrequency ablation January and March 2015    Headache(784.0)     PVC (premature ventricular contraction)     Sleep apnea     no C-pap at this time.     SVT (supraventricular tachycardia) ([facility])     Vertigo         Current Outpatient Medications   Medication Sig Dispense Refill    tamsulosin (FLOMAX) 0.4 MG capsule Take 1 capsule by mouth daily 30 capsule 0    ALBUTEROL IN Inhale 2 puffs into the lungs every 12 hours as needed      lisinopril-hydroCHLOROthiazide (PRINZIDE;ZESTORETIC) 20-12.5 MG per tablet Take 1 tablet by mouth daily      tamsulosin (FLOMAX) 0.4 MG capsule TAKE 1 CAPSULE (0.4 MG TOTAL) BY MOUTH IN THE

## 2023-07-25 ENCOUNTER — OFFICE VISIT (OUTPATIENT)
Dept: INTERNAL MEDICINE CLINIC | Age: 58
End: 2023-07-25

## 2023-07-25 VITALS
WEIGHT: 240 LBS | OXYGEN SATURATION: 99 % | HEART RATE: 93 BPM | BODY MASS INDEX: 28.83 KG/M2 | SYSTOLIC BLOOD PRESSURE: 122 MMHG | DIASTOLIC BLOOD PRESSURE: 78 MMHG

## 2023-07-25 DIAGNOSIS — R42 VERTIGO, CONSTANT: ICD-10-CM

## 2023-07-25 DIAGNOSIS — H81.13 BENIGN PAROXYSMAL POSITIONAL VERTIGO DUE TO BILATERAL VESTIBULAR DISORDER: Primary | ICD-10-CM

## 2023-07-25 PROCEDURE — 99213 OFFICE O/P EST LOW 20 MIN: CPT | Performed by: INTERNAL MEDICINE

## 2023-07-25 NOTE — PROGRESS NOTES
[address] [address]  Dept: [phone]  Dept Fax: [phone]    Aly Gaitan is a 66 y.o. male who presents today for his medicalconditions/complaints as noted below. Aly Gaitan is c/o of Dizziness (Two month follow up, not getting any better)      HPI:     Dizziness  This is a chronic problem. The current episode started more than 1 year ago. The problem occurs constantly. The problem has been waxing and waning (feels a little worse). He has tried immobilization and position changes [facility] for the symptoms. The treatment provided no relief. Has applied for disability through social security. Past Medical History:   Diagnosis Date    Arthritis     left knee, and bilateral hands    Asthma     Back pain     Fibromyalgia 2015    H/O cardiac radiofrequency ablation January and March 2015    Headache(784.0)     PVC (premature ventricular contraction)     Restless legs syndrome 2015    Sleep apnea     no C-pap at this time.     SVT (supraventricular tachycardia) (HCC)     Vertigo         Current Outpatient Medications   Medication Sig Dispense Refill    tamsulosin (FLOMAX) 0.4 MG capsule Take 1 capsule by mouth daily 30 capsule 0    ALBUTEROL IN Inhale 2 puffs into the lungs every 12 hours as needed      lisinopril-hydroCHLOROthiazide (PRINZIDE;ZESTORETIC) 20-12.5 MG per tablet Take 1 tablet by mouth daily      tamsulosin (FLOMAX) 0.4 MG capsule TAKE 1 CAPSULE (0.4 MG TOTAL) BY MOUTH IN THE MORNING.      naproxen sodium (ANAPROX) 550 MG tablet TAKE 1 TABLET BY MOUTH 2 TIMES DAILY AS NEEDED FOR PAIN (HEADACHE) 14 tablet 3    rizatriptan (MAXALT) 10 MG tablet PLEASE SEE ATTACHED FOR DETAILED DIRECTIONS 6 tablet 1    rOPINIRole (REQUIP) 0.5 MG tablet Take 1 tablet by mouth 3 times daily      topiramate (TOPAMAX) 50 MG tablet Take 1 tablet by mouth 2 times daily 60 tablet 5    amitriptyline (ELAVIL) 25 MG

## 2023-08-08 ENCOUNTER — TELEPHONE (OUTPATIENT)
Dept: INTERNAL MEDICINE CLINIC | Age: 58
End: 2023-08-08

## 2023-08-08 NOTE — TELEPHONE ENCOUNTER
----- Message from Enders April sent at 8/8/2023  3:03 PM EDT -----  Subject: Message to Provider    QUESTIONS  Information for Provider? Rani Babcock is saying she was told doctor's notes & any   iformation form June 22- July 31 and attending physician's statement needs   to be filled out. [phone] fax is [phone]  ---------------------------------------------------------------------------  --------------  [facility]  [phone]; OK to leave message on voicemail  ---------------------------------------------------------------------------  --------------  SCRIPT ANSWERS  Relationship to Patient? Covered Entity  Covered Entity Type? Health Insurance? Representative Name?  [employer] long term disability

## 2023-10-25 ENCOUNTER — OFFICE VISIT (OUTPATIENT)
Dept: INTERNAL MEDICINE CLINIC | Age: 58
End: 2023-10-25

## 2023-10-25 VITALS
BODY MASS INDEX: 28.59 KG/M2 | WEIGHT: 238 LBS | SYSTOLIC BLOOD PRESSURE: 142 MMHG | DIASTOLIC BLOOD PRESSURE: 80 MMHG | OXYGEN SATURATION: 95 % | HEART RATE: 63 BPM

## 2023-10-25 DIAGNOSIS — R52 PAIN: Primary | ICD-10-CM

## 2023-10-25 DIAGNOSIS — Z12.12 SCREENING FOR COLORECTAL CANCER: ICD-10-CM

## 2023-10-25 DIAGNOSIS — Z12.11 SCREENING FOR COLORECTAL CANCER: ICD-10-CM

## 2023-10-25 DIAGNOSIS — Z13.9 SCREENING DUE: ICD-10-CM

## 2023-10-25 PROCEDURE — 99214 OFFICE O/P EST MOD 30 MIN: CPT | Performed by: INTERNAL MEDICINE

## 2023-10-25 RX ORDER — LISINOPRIL AND HYDROCHLOROTHIAZIDE 20; 12.5 MG/1; MG/1
2 TABLET ORAL DAILY
Qty: 180 TABLET | Refills: 1 | Status: SHIPPED | OUTPATIENT
Start: 2023-10-25

## 2023-10-25 NOTE — PATIENT INSTRUCTIONS
Low salt diet  Get blood work done in one week  Call once insurance obtained to get more blood testing

## 2023-10-26 NOTE — PROGRESS NOTES
Visit Information    Have you changed or started any medications since your last visit including any over-the-counter medicines, vitamins, or herbal medicines? no   Are you having any side effects from any of your medications? -  no  Have you stopped taking any of your medications? Is so, why? -  no    Have you seen any other physician or provider since your last visit? No  Have you had any other diagnostic tests since your last visit? No  Have you been seen in the emergency room and/or had an admission to a hospital since we last saw you? No  Have you had your routine dental cleaning in the past 6 months? no    Have you activated your [employer] account? If not, what are your barriers?  Yes     Patient Care Team:  Bernard Esquivel MD as PCP - General (Internal Medicine)  Viviane Mcqueen MD as Consulting Physician (Urology)    Medical History Review  Past Medical, Family, and Social History reviewed and does contribute to the patient presenting condition    Health Maintenance   Topic Date Due    Hepatitis B vaccine (1 of 3 - 3-dose series) Never done    COVID-19 Vaccine (1) Never done    Hepatitis C screen  Never done    Colorectal Cancer Screen  Never done    Shingles vaccine (1 of 2) Never done    Flu vaccine (1) 08/01/2023    Depression Screen  03/13/2024    Lipids  07/03/2024    Diabetes screen  03/14/2026    DTaP/Tdap/Td vaccine (2 - Td or Tdap) 11/26/2027    HIV screen  Completed    Hepatitis A vaccine  Aged Out    Hib vaccine  Aged Out    Meningococcal (ACWY) vaccine  Aged Out    Pneumococcal 0-64 years Vaccine  Aged Out
wheezing  Cardiovascular: Negative for chest pain, palpitations, shortness of breath  GI: Negative for abdominal pain, nausea, vomiting, diarrhea, constipation  Genitourinary : negative for dysuria, urinary frequency, urinary urgency, hematuria  Neurological : Negative for headache, dizziness, gait change,   Psych : Negative for depression, anxiety  Skin: Negative rash and skin lesions  Musculoskeletal : As reported above      PHYSICAL EXAM:      Vitals:    10/25/23 1216   BP: (!) 142/80   Site: Left Upper Arm   Position: Sitting   Pulse: 63   SpO2: 95%   Weight: 108 kg (238 lb)     BP Readings from Last 3 Encounters:   10/25/23 (!) 142/80   07/25/23 122/78   05/25/23 (!) 130/92        Constitutional: Normal appearance for chronological age, does not appear chronically ill. HEENT: The pupils are equal and reactive. Neck: Trachea is midline. The neck is supple. Negative nodes. Respiratory: normal respiratory effort. Lungs are clear to auscultation bilaterally. no wheezing, no crackles. Cardiovascular: No jugular venous distention or carotid bruits. Normal S1 and S2 sounds are noted without murmurs, rubs, gallops. No leg edema  Abdomen: Soft to palpation in all four quadrants. There is no organomegaly and no rebound tenderness. Bowel sounds are normal.   Musculoskeletal: Muscular strength good bilaterally. No joint swelling  Neurological: Normal Jame Coma Scale.   Alert oriented x3, grossly nonfocal    No results found for: \"LABA1C\"  No results found for: \"EAG\"   LABORATORY FINDINGS:    CBC:  Lab Results   Component Value Date/Time    WBC 8.2 04/06/2021 10:07 PM    HGB 15.2 04/06/2021 10:07 PM     04/06/2021 10:07 PM       BMP:    Lab Results   Component Value Date/Time     04/06/2021 10:07 PM    K 4.0 04/06/2021 10:07 PM     04/06/2021 10:07 PM    CO2 20 04/06/2021 10:07 PM    BUN 16 04/06/2021 10:07 PM    CREATININE 1.21 09/28/2022 02:23 PM    CREATININE 0.95 04/06/2021 10:07 PM

## 2023-11-06 ENCOUNTER — HOSPITAL ENCOUNTER (OUTPATIENT)
Age: 58
Setting detail: SPECIMEN
Discharge: HOME | End: 2023-11-06

## 2023-11-06 DIAGNOSIS — R39.14 BENIGN PROSTATIC HYPERPLASIA WITH INCOMPLETE BLADDER EMPTYING: ICD-10-CM

## 2023-11-06 DIAGNOSIS — N40.1 BENIGN PROSTATIC HYPERPLASIA WITH INCOMPLETE BLADDER EMPTYING: ICD-10-CM

## 2023-11-06 LAB — PSA SERPL-MCNC: 0.97 NG/ML

## 2023-11-28 ENCOUNTER — OFFICE VISIT (OUTPATIENT)
Dept: FAMILY MEDICINE CLINIC | Age: 58
End: 2023-11-28

## 2023-11-28 VITALS
HEART RATE: 85 BPM | DIASTOLIC BLOOD PRESSURE: 87 MMHG | TEMPERATURE: 98.3 F | SYSTOLIC BLOOD PRESSURE: 135 MMHG | OXYGEN SATURATION: 96 %

## 2023-11-28 DIAGNOSIS — J01.90 ACUTE BACTERIAL SINUSITIS: Primary | ICD-10-CM

## 2023-11-28 DIAGNOSIS — R05.9 COUGH IN ADULT: ICD-10-CM

## 2023-11-28 DIAGNOSIS — B96.89 ACUTE BACTERIAL SINUSITIS: Primary | ICD-10-CM

## 2023-11-28 DIAGNOSIS — K12.2 UVULITIS: ICD-10-CM

## 2023-11-28 PROCEDURE — 99213 OFFICE O/P EST LOW 20 MIN: CPT

## 2023-11-28 RX ORDER — AMOXICILLIN AND CLAVULANATE POTASSIUM 875; 125 MG/1; MG/1
1 TABLET, FILM COATED ORAL 2 TIMES DAILY
Qty: 20 TABLET | Refills: 0 | Status: SHIPPED | OUTPATIENT
Start: 2023-11-28 | End: 2023-12-08

## 2023-11-28 RX ORDER — BROMPHENIRAMINE MALEATE, PSEUDOEPHEDRINE HYDROCHLORIDE, AND DEXTROMETHORPHAN HYDROBROMIDE 2; 30; 10 MG/5ML; MG/5ML; MG/5ML
5 SYRUP ORAL 4 TIMES DAILY PRN
Qty: 118 ML | Refills: 0 | Status: SHIPPED | OUTPATIENT
Start: 2023-11-28

## 2023-11-28 RX ORDER — PREDNISONE 20 MG/1
40 TABLET ORAL DAILY
Qty: 10 TABLET | Refills: 0 | Status: SHIPPED | OUTPATIENT
Start: 2023-11-28 | End: 2023-12-03

## 2023-11-28 ASSESSMENT — ENCOUNTER SYMPTOMS
EYE PAIN: 0
VISUAL CHANGE: 0
STRIDOR: 0
SINUS PRESSURE: 0
EYE DISCHARGE: 0
WHEEZING: 0
EYE REDNESS: 0
ABDOMINAL DISTENTION: 0
SHORTNESS OF BREATH: 0
RECTAL PAIN: 0
NAUSEA: 0
VOICE CHANGE: 1
SORE THROAT: 1
COUGH: 1
VOMITING: 0
BACK PAIN: 0
ABDOMINAL PAIN: 0
DIARRHEA: 0
COLOR CHANGE: 0
PHOTOPHOBIA: 0
CONSTIPATION: 0
ANAL BLEEDING: 0
EYES NEGATIVE: 1
FACIAL SWELLING: 0
CHEST TIGHTNESS: 0
SINUS PAIN: 0
BLOOD IN STOOL: 0
EYE ITCHING: 0
TROUBLE SWALLOWING: 0
SWOLLEN GLANDS: 0
CHOKING: 0
RHINORRHEA: 0
APNEA: 0
GASTROINTESTINAL NEGATIVE: 1
CHANGE IN BOWEL HABIT: 0

## 2023-11-28 NOTE — PROGRESS NOTES
brompheniramine-pseudoephedrine-DM 2-30-10 MG/5ML syrup Take 5 mLs by mouth 4 times daily as needed for Cough 118 mL 0    lisinopril-hydroCHLOROthiazide (PRINZIDE;ZESTORETIC) 20-12.5 MG per tablet Take 2 tablets by mouth daily 180 tablet 1    ALBUTEROL IN Inhale 2 puffs into the lungs every 12 hours as needed      tamsulosin (FLOMAX) 0.4 MG capsule TAKE 1 CAPSULE (0.4 MG TOTAL) BY MOUTH IN THE MORNING.      naproxen sodium (ANAPROX) 550 MG tablet TAKE 1 TABLET BY MOUTH 2 TIMES DAILY AS NEEDED FOR PAIN (HEADACHE) 14 tablet 3    rizatriptan (MAXALT) 10 MG tablet PLEASE SEE ATTACHED FOR DETAILED DIRECTIONS 6 tablet 1    rOPINIRole (REQUIP) 0.5 MG tablet Take 2 tablets by mouth at bedtime      topiramate (TOPAMAX) 50 MG tablet Take 1 tablet by mouth 2 times daily 60 tablet 5    amitriptyline (ELAVIL) 25 MG tablet Take 1 tablet by mouth nightly 90 tablet 2    eszopiclone (LUNESTA) 1 MG TABS Take 1 tablet by mouth nightly as needed. 0    tamsulosin (FLOMAX) 0.4 MG capsule Take 1 capsule by mouth daily 30 capsule 0     No current facility-administered medications for this visit. Allergies   Allergen Reactions    Seasonal Itching, Dermatitis and Rash    Terbinafine Rash     Other reaction(s): bleeding skin, peeling cracking  Other reaction(s): bleeding skin, peeling cracking         Review of Systems:     Review of Systems   Constitutional: Negative. Negative for activity change, appetite change, chills, diaphoresis, fatigue, fever and unexpected weight change. HENT:  Positive for ear pain, postnasal drip, sore throat and voice change. Negative for congestion, dental problem, drooling, ear discharge, facial swelling, hearing loss, mouth sores, nosebleeds, rhinorrhea, sinus pressure, sinus pain, sneezing, tinnitus and trouble swallowing. Eyes: Negative. Negative for photophobia, pain, discharge, redness, itching and visual disturbance. Respiratory:  Positive for cough.  Negative for apnea, choking, chest

## 2023-11-28 NOTE — PATIENT INSTRUCTIONS
Patient Education        Sore Throat: Care Instructions  Overview     Infection by bacteria or a virus causes most sore throats. Cigarette smoke, dry air, air pollution, allergies, and yelling can also cause a sore throat. Sore throats can be painful and annoying. Fortunately, most sore throats go away on their own. If you have a bacterial infection, your doctor may prescribe antibiotics. Follow-up care is a key part of your treatment and safety. Be sure to make and go to all appointments, and call your doctor if you are having problems. It's also a good idea to know your test results and keep a list of the medicines you take. How can you care for yourself at home? If your doctor prescribed antibiotics, take them as directed. Do not stop taking them just because you feel better. You need to take the full course of antibiotics. Gargle with warm salt water several times a day to help reduce swelling and relieve pain. Mix 1/2 teaspoon of salt in 1 cup of warm water. Take an over-the-counter pain medicine, such as acetaminophen (Tylenol), ibuprofen (Advil, Motrin), or naproxen (Aleve). Read and follow all instructions on the label. Be careful when taking over-the-counter cold or flu medicines and Tylenol at the same time. Many of these medicines have acetaminophen, which is Tylenol. Read the labels to make sure that you are not taking more than the recommended dose. Too much acetaminophen (Tylenol) can be harmful. Drink plenty of fluids. Fluids may help soothe an irritated throat. Hot fluids, such as tea or soup, may help decrease throat pain. Use over-the-counter throat lozenges to soothe pain. Regular cough drops or hard candy may also help. These should not be given to young children because of the risk of choking. Do not smoke or allow others to smoke around you. If you need help quitting, talk to your doctor about stop-smoking programs and medicines.  These can increase your chances of quitting for

## 2024-03-26 ENCOUNTER — OFFICE VISIT (OUTPATIENT)
Dept: INTERNAL MEDICINE CLINIC | Age: 59
End: 2024-03-26

## 2024-03-26 VITALS
OXYGEN SATURATION: 95 % | HEIGHT: 77 IN | HEART RATE: 103 BPM | WEIGHT: 246 LBS | SYSTOLIC BLOOD PRESSURE: 128 MMHG | DIASTOLIC BLOOD PRESSURE: 74 MMHG | BODY MASS INDEX: 29.05 KG/M2

## 2024-03-26 DIAGNOSIS — R42 VERTIGO: ICD-10-CM

## 2024-03-26 DIAGNOSIS — F32.9 REACTIVE DEPRESSION: ICD-10-CM

## 2024-03-26 DIAGNOSIS — G47.8 POOR SLEEP PATTERN: ICD-10-CM

## 2024-03-26 DIAGNOSIS — H93.13 TINNITUS OF BOTH EARS: Primary | ICD-10-CM

## 2024-03-26 DIAGNOSIS — Z13.31 POSITIVE DEPRESSION SCREENING: ICD-10-CM

## 2024-03-26 PROCEDURE — 99214 OFFICE O/P EST MOD 30 MIN: CPT | Performed by: INTERNAL MEDICINE

## 2024-03-26 RX ORDER — AMITRIPTYLINE HYDROCHLORIDE 25 MG/1
25 TABLET, FILM COATED ORAL NIGHTLY
Qty: 30 TABLET | Refills: 3 | Status: SHIPPED | OUTPATIENT
Start: 2024-03-26

## 2024-03-26 SDOH — ECONOMIC STABILITY: TRANSPORTATION INSECURITY
IN THE PAST 12 MONTHS, HAS LACK OF TRANSPORTATION KEPT YOU FROM MEETINGS, WORK, OR FROM GETTING THINGS NEEDED FOR DAILY LIVING?: PATIENT DECLINED

## 2024-03-26 SDOH — ECONOMIC STABILITY: HOUSING INSECURITY
IN THE LAST 12 MONTHS, WAS THERE A TIME WHEN YOU DID NOT HAVE A STEADY PLACE TO SLEEP OR SLEPT IN A SHELTER (INCLUDING NOW)?: PATIENT DECLINED

## 2024-03-26 SDOH — ECONOMIC STABILITY: FOOD INSECURITY: WITHIN THE PAST 12 MONTHS, THE FOOD YOU BOUGHT JUST DIDN'T LAST AND YOU DIDN'T HAVE MONEY TO GET MORE.: PATIENT DECLINED

## 2024-03-26 SDOH — ECONOMIC STABILITY: FOOD INSECURITY: WITHIN THE PAST 12 MONTHS, YOU WORRIED THAT YOUR FOOD WOULD RUN OUT BEFORE YOU GOT MONEY TO BUY MORE.: PATIENT DECLINED

## 2024-03-26 SDOH — ECONOMIC STABILITY: INCOME INSECURITY: HOW HARD IS IT FOR YOU TO PAY FOR THE VERY BASICS LIKE FOOD, HOUSING, MEDICAL CARE, AND HEATING?: PATIENT DECLINED

## 2024-03-26 ASSESSMENT — PATIENT HEALTH QUESTIONNAIRE - PHQ9
10. IF YOU CHECKED OFF ANY PROBLEMS, HOW DIFFICULT HAVE THESE PROBLEMS MADE IT FOR YOU TO DO YOUR WORK, TAKE CARE OF THINGS AT HOME, OR GET ALONG WITH OTHER PEOPLE: SOMEWHAT DIFFICULT
4. FEELING TIRED OR HAVING LITTLE ENERGY: NEARLY EVERY DAY
8. MOVING OR SPEAKING SO SLOWLY THAT OTHER PEOPLE COULD HAVE NOTICED. OR THE OPPOSITE - BEING SO FIDGETY OR RESTLESS THAT YOU HAVE BEEN MOVING AROUND A LOT MORE THAN USUAL: MORE THAN HALF THE DAYS
9. THOUGHTS THAT YOU WOULD BE BETTER OFF DEAD, OR OF HURTING YOURSELF: NOT AT ALL
SUM OF ALL RESPONSES TO PHQ9 QUESTIONS 1 & 2: 5
3. TROUBLE FALLING OR STAYING ASLEEP: MORE THAN HALF THE DAYS
3. TROUBLE FALLING OR STAYING ASLEEP: MORE THAN HALF THE DAYS
2. FEELING DOWN, DEPRESSED OR HOPELESS: MORE THAN HALF THE DAYS
SUM OF ALL RESPONSES TO PHQ QUESTIONS 1-9: 18
8. MOVING OR SPEAKING SO SLOWLY THAT OTHER PEOPLE COULD HAVE NOTICED. OR THE OPPOSITE, BEING SO FIGETY OR RESTLESS THAT YOU HAVE BEEN MOVING AROUND A LOT MORE THAN USUAL: MORE THAN HALF THE DAYS
SUM OF ALL RESPONSES TO PHQ QUESTIONS 1-9: 18
9. THOUGHTS THAT YOU WOULD BE BETTER OFF DEAD, OR OF HURTING YOURSELF: NOT AT ALL
SUM OF ALL RESPONSES TO PHQ QUESTIONS 1-9: 18
4. FEELING TIRED OR HAVING LITTLE ENERGY: NEARLY EVERY DAY
2. FEELING DOWN, DEPRESSED OR HOPELESS: MORE THAN HALF THE DAYS
SUM OF ALL RESPONSES TO PHQ QUESTIONS 1-9: 18
10. IF YOU CHECKED OFF ANY PROBLEMS, HOW DIFFICULT HAVE THESE PROBLEMS MADE IT FOR YOU TO DO YOUR WORK, TAKE CARE OF THINGS AT HOME, OR GET ALONG WITH OTHER PEOPLE: SOMEWHAT DIFFICULT
1. LITTLE INTEREST OR PLEASURE IN DOING THINGS: NEARLY EVERY DAY
5. POOR APPETITE OR OVEREATING: MORE THAN HALF THE DAYS
6. FEELING BAD ABOUT YOURSELF - OR THAT YOU ARE A FAILURE OR HAVE LET YOURSELF OR YOUR FAMILY DOWN: SEVERAL DAYS
7. TROUBLE CONCENTRATING ON THINGS, SUCH AS READING THE NEWSPAPER OR WATCHING TELEVISION: NEARLY EVERY DAY
5. POOR APPETITE OR OVEREATING: MORE THAN HALF THE DAYS
7. TROUBLE CONCENTRATING ON THINGS, SUCH AS READING THE NEWSPAPER OR WATCHING TELEVISION: NEARLY EVERY DAY
SUM OF ALL RESPONSES TO PHQ QUESTIONS 1-9: 18
1. LITTLE INTEREST OR PLEASURE IN DOING THINGS: NEARLY EVERY DAY
SUM OF ALL RESPONSES TO PHQ9 QUESTIONS 1 & 2: 5

## 2024-03-26 NOTE — PROGRESS NOTES
Visit Information    Have you changed or started any medications since your last visit including any over-the-counter medicines, vitamins, or herbal medicines? no   Are you having any side effects from any of your medications? -  no  Have you stopped taking any of your medications? Is so, why? -  no    Have you seen any other physician or provider since your last visit? No  Have you had any other diagnostic tests since your last visit? No  Have you been seen in the emergency room and/or had an admission to a hospital since we last saw you? No  Have you had your routine dental cleaning in the past 6 months? no    Have you activated your [employer] account? If not, what are your barriers? Yes     Patient Care Team:  Isra Paniting MD as PCP - General (Internal Medicine)  Isra Painting MD as PCP - Empaneled Provider  Jim Hightower MD as Consulting Physician (Urology)    Medical History Review  Past Medical, Family, and Social History reviewed and does contribute to the patient presenting condition    Health Maintenance   Topic Date Due    Hepatitis B vaccine (1 of 3 - 3-dose series) Never done    COVID-19 Vaccine (1) Never done    Hepatitis C screen  Never done    Colorectal Cancer Screen  Never done    Shingles vaccine (1 of 2) Never done    Flu vaccine (1) 08/01/2023    Lipids  07/03/2024    Depression Monitoring  03/26/2025    Diabetes screen  03/14/2026    DTaP/Tdap/Td vaccine (2 - Td or Tdap) 11/26/2027    HIV screen  Completed    Hepatitis A vaccine  Aged Out    Hib vaccine  Aged Out    Polio vaccine  Aged Out    Meningococcal (ACWY) vaccine  Aged Out    Pneumococcal 0-64 years Vaccine  Aged Out    Depression Screen  Discontinued    Prostate Specific Antigen (PSA) Screening or Monitoring  Discontinued

## 2024-03-26 NOTE — PROGRESS NOTES
PHQ-9 score today: (PHQ-9 Total Score: 18), additional evaluation and assessment performed, follow-up plan includes but not limited to: Medication management and Referral to /Specialist  for evaluation and management.

## 2024-03-27 NOTE — PROGRESS NOTES
OFFICE VISIT  PROGRESS NOTE         Date of patient's visit: 3/27/24  Patient's Name:  Julien Batista  YOB: 1965       Patient Care Team:  Isra Painting MD as PCP - General (Internal Medicine)  Isra Painting MD as PCP - Empaneled Provider  Jim Hightower MD as Consulting Physician (Urology)        SUBJECTIVE     HISTORY           History of present illness     Pertinent details  added to ,       Chief Complaint   Patient presents with    Tinnitus     2 week history, has tried ear drops with no relief, bilateral ears but worse in left           Encounter Diagnoses   Name Primary?    Tinnitus of both ears Yes    Positive depression screening     Vertigo     Reactive depression     Poor sleep pattern         Symptom / problem          --history obtained from patient.-   Patient presented for evaluation of tinnitus of the both ears which has been aggravated recently  Patient is quite bothered by that  Also has seen ENT in the past for positional vertigo    His depression score was positive  According to patient is because of the persistent hearing related issues I tinnitus vertigo persistent  Patient also has significant insomnia on an average sleep 3 3-1/2 hours a night             MEDICATIONS:      Current Outpatient Medications   Medication Sig Dispense Refill    amitriptyline (ELAVIL) 25 MG tablet Take 1 tablet by mouth nightly 30 tablet 3    tamsulosin (FLOMAX) 0.4 MG capsule Take 1 capsule by mouth daily 30 capsule 0    ALBUTEROL IN Inhale 2 puffs into the lungs every 12 hours as needed      rizatriptan (MAXALT) 10 MG tablet PLEASE SEE ATTACHED FOR DETAILED DIRECTIONS 6 tablet 1    rOPINIRole (REQUIP) 0.5 MG tablet Take 2 tablets by mouth at bedtime      lisinopril-hydroCHLOROthiazide

## 2024-04-15 ENCOUNTER — TELEPHONE (OUTPATIENT)
Dept: INTERNAL MEDICINE CLINIC | Age: 59
End: 2024-04-15

## 2024-05-23 ENCOUNTER — OFFICE VISIT (OUTPATIENT)
Dept: INTERNAL MEDICINE CLINIC | Age: 59
End: 2024-05-23

## 2024-05-23 VITALS
HEART RATE: 85 BPM | DIASTOLIC BLOOD PRESSURE: 88 MMHG | BODY MASS INDEX: 29.55 KG/M2 | WEIGHT: 246 LBS | SYSTOLIC BLOOD PRESSURE: 130 MMHG | OXYGEN SATURATION: 92 %

## 2024-05-23 DIAGNOSIS — Z12.11 SCREEN FOR COLON CANCER: Primary | ICD-10-CM

## 2024-05-23 PROCEDURE — 99214 OFFICE O/P EST MOD 30 MIN: CPT | Performed by: INTERNAL MEDICINE

## 2024-05-23 RX ORDER — NAPROXEN SODIUM 550 MG/1
550 TABLET ORAL 2 TIMES DAILY PRN
Qty: 14 TABLET | Refills: 3 | Status: SHIPPED | OUTPATIENT
Start: 2024-05-23

## 2024-05-23 RX ORDER — RIZATRIPTAN BENZOATE 10 MG/1
TABLET ORAL
Qty: 6 TABLET | Refills: 1 | Status: SHIPPED | OUTPATIENT
Start: 2024-05-23

## 2024-05-23 RX ORDER — TAMSULOSIN HYDROCHLORIDE 0.4 MG/1
0.4 CAPSULE ORAL DAILY
Qty: 30 CAPSULE | Refills: 0 | Status: SHIPPED | OUTPATIENT
Start: 2024-05-23

## 2024-05-23 NOTE — PROGRESS NOTES
[facility]  [address] [address]  Dept: [phone]  Dept Fax: [phone]    Office Progress/Follow Up Note  Date of patient's visit: 5/23/2024  Patient's Name:  Julien Batista YOB: 1965            Patient Care Team:  Isra Painting MD as PCP - General (Internal Medicine)  [facility] Pain[facility]  Jim Hightower MD as Consulting Physician (Urology)    REASON FOR VISIT: Follow-up visit    HISTORY OF PRESENT ILLNESS:      Chief Complaint   Patient presents with    Tinnitus     Still, ENT didn't help was concerned with vertigo with migraines         History was obtained from the patient. Julien Batista is a 59 y.o. is here for a follow-up visit.    Hypertension  Controlled  Taking prinzide    The patient is here for a follow-up visit, was last seen in the clinic on 7/25/2023, at that time was told he had BPPV.  The patient has also been to [place] where he had formal BPPV testing, was noted to have nystagmus.  He has tried meclizine with no improvement in symptoms.  Dizziness in improving, still has ringing in both ears. Saw ENT since last seen    Migraines   On topamax, rizatriptan prn    Patient Active Problem List   Diagnosis    Spondylosis of lumbosacral region    Sacroiliitis (HCC)    Headache    Meningitis    Symptomatic bradycardia    Confusion    S/P ablation operation for arrhythmia    Retrograde amnesia    SIRS (systemic inflammatory response syndrome) (HCC)    Phlebitis of superficial vein of upper extremity    Cellulitis of left arm    Cellulitis of right arm    Fever chills    Migraine without aura and without status migrainosus, not intractable    Tinnitus of both ears    Vertigo    Reactive depression       Health Maintenance Due   Topic Date Due    Hepatitis B vaccine (1 of 3 - 3-dose series) Never done    COVID-19 Vaccine (1) Never done    Hepatitis C screen  Never done    Colorectal Cancer Screen  Never

## 2024-06-19 ENCOUNTER — TELEPHONE (OUTPATIENT)
Dept: UROLOGY | Age: 59
End: 2024-06-19

## 2024-06-19 NOTE — TELEPHONE ENCOUNTER
Pt c/o of possible kidney stone and flank pain.  He stated \"I am in [place] until the 2nd week of July.  Is there anything OTC I can take for pain and if there an OTC like flomax?  It is hard to pee and I get a strong urge but just dribble.\"  Pt was informed that per CNP pt will need to go to the ER down in [place] due to flank pain.  Pt upset and stated I do not have insurance and hung up.

## 2024-07-11 ENCOUNTER — TELEPHONE (OUTPATIENT)
Dept: INTERNAL MEDICINE CLINIC | Age: 59
End: 2024-07-11

## 2024-07-11 NOTE — TELEPHONE ENCOUNTER
UPDATE:    Tired to fax a request to [facility] in [place] a release medical records and to have them sent to us from a recent hospital visit he had there due to a kidney stone on 6/19/24.    Fax number came back busy twice but I did send an online request that confirmed and should take 10 business days to send to the office. (But we will see if it worked?)    Scanned in his media is the cover sheet trying to be sent through.    Thank you.

## 2024-07-11 NOTE — TELEPHONE ENCOUNTER
Julien was transferred to the office from [facility] to schedule an appointment to be rechecked about his Vertigo.    He needs on at the end of this month, 2 weeks before the deadline of Aug. 13th, to keep his benefits for his job.    Also stated he passed a kidney stone in [facility] and would like to follow up with that also but will call his Urologist first to see if he needs an appointment with them before PCP. But will let us know about that appointment request.    The soonest appointment was Aug.15th (he checked on MyChart and I checked while talking to him) so I let him know I would sent a message to Dr Painting to see if we can get him in sooner with in regards to the Vertigo.    Please advise patient to be scheduled.    Thank you.

## 2024-07-15 ENCOUNTER — OFFICE VISIT (OUTPATIENT)
Dept: INTERNAL MEDICINE CLINIC | Age: 59
End: 2024-07-15

## 2024-07-15 VITALS
BODY MASS INDEX: 30.78 KG/M2 | DIASTOLIC BLOOD PRESSURE: 86 MMHG | SYSTOLIC BLOOD PRESSURE: 128 MMHG | HEART RATE: 94 BPM | WEIGHT: 252.8 LBS | OXYGEN SATURATION: 94 % | HEIGHT: 76 IN

## 2024-07-15 DIAGNOSIS — R65.10 SIRS (SYSTEMIC INFLAMMATORY RESPONSE SYNDROME) (HCC): ICD-10-CM

## 2024-07-15 DIAGNOSIS — Z12.11 COLON CANCER SCREENING: ICD-10-CM

## 2024-07-15 DIAGNOSIS — R42 VERTIGO: ICD-10-CM

## 2024-07-15 DIAGNOSIS — M46.1 SACROILIITIS (HCC): Primary | ICD-10-CM

## 2024-07-15 PROCEDURE — 99214 OFFICE O/P EST MOD 30 MIN: CPT | Performed by: INTERNAL MEDICINE

## 2024-07-15 SDOH — ECONOMIC STABILITY: FOOD INSECURITY: WITHIN THE PAST 12 MONTHS, YOU WORRIED THAT YOUR FOOD WOULD RUN OUT BEFORE YOU GOT MONEY TO BUY MORE.: PATIENT DECLINED

## 2024-07-15 SDOH — ECONOMIC STABILITY: FOOD INSECURITY: WITHIN THE PAST 12 MONTHS, THE FOOD YOU BOUGHT JUST DIDN'T LAST AND YOU DIDN'T HAVE MONEY TO GET MORE.: PATIENT DECLINED

## 2024-07-15 SDOH — ECONOMIC STABILITY: INCOME INSECURITY: HOW HARD IS IT FOR YOU TO PAY FOR THE VERY BASICS LIKE FOOD, HOUSING, MEDICAL CARE, AND HEATING?: PATIENT DECLINED

## 2024-07-15 ASSESSMENT — PATIENT HEALTH QUESTIONNAIRE - PHQ9
SUM OF ALL RESPONSES TO PHQ QUESTIONS 1-9: 0
7. TROUBLE CONCENTRATING ON THINGS, SUCH AS READING THE NEWSPAPER OR WATCHING TELEVISION: NOT AT ALL
9. THOUGHTS THAT YOU WOULD BE BETTER OFF DEAD, OR OF HURTING YOURSELF: NOT AT ALL
SUM OF ALL RESPONSES TO PHQ9 QUESTIONS 1 & 2: 0
2. FEELING DOWN, DEPRESSED OR HOPELESS: NOT AT ALL
8. MOVING OR SPEAKING SO SLOWLY THAT OTHER PEOPLE COULD HAVE NOTICED. OR THE OPPOSITE, BEING SO FIGETY OR RESTLESS THAT YOU HAVE BEEN MOVING AROUND A LOT MORE THAN USUAL: NOT AT ALL
3. TROUBLE FALLING OR STAYING ASLEEP: NOT AT ALL
6. FEELING BAD ABOUT YOURSELF - OR THAT YOU ARE A FAILURE OR HAVE LET YOURSELF OR YOUR FAMILY DOWN: NOT AT ALL
SUM OF ALL RESPONSES TO PHQ QUESTIONS 1-9: 0
4. FEELING TIRED OR HAVING LITTLE ENERGY: NOT AT ALL
5. POOR APPETITE OR OVEREATING: NOT AT ALL
SUM OF ALL RESPONSES TO PHQ QUESTIONS 1-9: 0
10. IF YOU CHECKED OFF ANY PROBLEMS, HOW DIFFICULT HAVE THESE PROBLEMS MADE IT FOR YOU TO DO YOUR WORK, TAKE CARE OF THINGS AT HOME, OR GET ALONG WITH OTHER PEOPLE: NOT DIFFICULT AT ALL
SUM OF ALL RESPONSES TO PHQ QUESTIONS 1-9: 0
1. LITTLE INTEREST OR PLEASURE IN DOING THINGS: NOT AT ALL

## 2024-07-15 NOTE — PROGRESS NOTES
transcription, some errors in transcription may have occurred.       Visit Information    Have you changed or started any medications since your last visit including any over-the-counter medicines, vitamins, or herbal medicines? no   Are you having any side effects from any of your medications? -  no  Have you stopped taking any of your medications? Is so, why? -  no    Have you seen any other physician or provider since your last visit? Yes - Records Obtained  Have you had any other diagnostic tests since your last visit? No  Have you been seen in the emergency room and/or had an admission to a hospital since we last saw you? Yes - Records Obtained  Have you had your routine dental cleaning in the past 6 months? no    Have you activated your [employer] account? If not, what are your barriers? Yes     Patient Care Team:  Isra Painting MD as PCP - General (Internal Medicine)  Isra Painting MD as PCP - Empaneled Provider  Jim Hightower MD as Consulting Physician (Urology)    Medical History Review  Past Medical, Family, and Social History reviewed and does not contribute to the patient presenting condition    Health Maintenance   Topic Date Due    Hepatitis B vaccine (1 of 3 - 3-dose series) Never done    COVID-19 Vaccine (1) Never done    Hepatitis C screen  Never done    Colorectal Cancer Screen  Never done    Shingles vaccine (1 of 2) Never done    Lipids  07/03/2024    Flu vaccine (1) 08/01/2024    Depression Monitoring  03/26/2025    Diabetes screen  03/14/2026    DTaP/Tdap/Td vaccine (2 - Td or Tdap) 11/26/2027    HIV screen  Completed    Hepatitis A vaccine  Aged Out    Hib vaccine  Aged Out    Polio vaccine  Aged Out    Meningococcal (ACWY) vaccine  Aged Out    Pneumococcal 0-64 years Vaccine  Aged Out    Depression Screen  Discontinued    Prostate Specific Antigen (PSA) Screening or Monitoring  Discontinued

## 2024-07-25 ENCOUNTER — TELEPHONE (OUTPATIENT)
Dept: INTERNAL MEDICINE CLINIC | Age: 59
End: 2024-07-25

## 2024-07-25 NOTE — TELEPHONE ENCOUNTER
----- Message from Valdez Mcclureboo Hawkins sent at 7/25/2024 10:05 AM EDT -----  Regarding: ECC Message to Provider  ECC Message to Provider    Relationship to Patient: covered Entity    Additional Information .status of medical records Fax number: [phone]  --------------------------------------------------------------------------------------------------------------------------    Call Back Information: OK to leave message on voicemail  Preferred Call Back Number: Phone [phone]

## 2024-07-25 NOTE — TELEPHONE ENCOUNTER
Called and spoke with pt FMLA paperwork ended today needs reassess to see if pt needs to be off appt made for 7/26/24

## 2024-07-25 NOTE — TELEPHONE ENCOUNTER
Attempted to return call to number listed in message, unable to reach anyone, automated system.     Spoke with Dr. Painting's MA, she will call Julien to follow up

## 2024-07-26 ENCOUNTER — OFFICE VISIT (OUTPATIENT)
Dept: INTERNAL MEDICINE CLINIC | Age: 59
End: 2024-07-26

## 2024-07-26 ENCOUNTER — TELEPHONE (OUTPATIENT)
Dept: INTERNAL MEDICINE CLINIC | Age: 59
End: 2024-07-26

## 2024-07-26 VITALS
DIASTOLIC BLOOD PRESSURE: 82 MMHG | OXYGEN SATURATION: 96 % | WEIGHT: 251.8 LBS | HEART RATE: 64 BPM | HEIGHT: 76 IN | BODY MASS INDEX: 30.66 KG/M2 | SYSTOLIC BLOOD PRESSURE: 118 MMHG

## 2024-07-26 DIAGNOSIS — R42 VERTIGO, CONSTANT: ICD-10-CM

## 2024-07-26 DIAGNOSIS — R42 VERTIGO: ICD-10-CM

## 2024-07-26 DIAGNOSIS — Z02.71 DISABILITY EXAMINATION: Primary | ICD-10-CM

## 2024-07-26 PROCEDURE — 99213 OFFICE O/P EST LOW 20 MIN: CPT | Performed by: INTERNAL MEDICINE

## 2024-07-26 NOTE — PROGRESS NOTES
Visit Information    Have you changed or started any medications since your last visit including any over-the-counter medicines, vitamins, or herbal medicines? no   Are you having any side effects from any of your medications? -  no  Have you stopped taking any of your medications? Is so, why? -  no    Have you seen any other physician or provider since your last visit? Yes - Records Obtained  Have you had any other diagnostic tests since your last visit? Yes - Records Obtained  Have you been seen in the emergency room and/or had an admission to a hospital since we last saw you? Yes - Records Obtained  Have you had your routine dental cleaning in the past 6 months? no    Have you activated your [employer] account? If not, what are your barriers? Yes     Patient Care Team:  Isra Painting MD as PCP - General (Internal Medicine)  Isra Painting MD as PCP - Empaneled Provider  Jim Hightower MD as Consulting Physician (Urology)    Medical History Review  Past Medical, Family, and Social History reviewed and does contribute to the patient presenting condition    Health Maintenance   Topic Date Due    Hepatitis B vaccine (1 of 3 - 3-dose series) Never done    COVID-19 Vaccine (1) Never done    Hepatitis C screen  Never done    Colorectal Cancer Screen  Never done    Shingles vaccine (1 of 2) Never done    Lipids  07/03/2024    Flu vaccine (1) 08/01/2024    Depression Monitoring  07/15/2025    Diabetes screen  03/14/2026    DTaP/Tdap/Td vaccine (2 - Td or Tdap) 11/26/2027    HIV screen  Completed    Hepatitis A vaccine  Aged Out    Hib vaccine  Aged Out    Polio vaccine  Aged Out    Meningococcal (ACWY) vaccine  Aged Out    Pneumococcal 0-64 years Vaccine  Aged Out    Depression Screen  Discontinued    Prostate Specific Antigen (PSA) Screening or Monitoring  Discontinued     
Cellulitis of left arm    Cellulitis of right arm    Fever chills    Migraine without aura and without status migrainosus, not intractable    Tinnitus of both ears    Vertigo    Reactive depression       Health Maintenance Due   Topic Date Due    Hepatitis B vaccine (1 of 3 - 3-dose series) Never done    COVID-19 Vaccine (1) Never done    Hepatitis C screen  Never done    Colorectal Cancer Screen  Never done    Shingles vaccine (1 of 2) Never done    Lipids  07/03/2024       Allergies   Allergen Reactions    Seasonal Itching, Dermatitis and Rash         MEDICATIONS:     Current Outpatient Medications   Medication Sig Dispense Refill    tamsulosin (FLOMAX) 0.4 MG capsule Take 1 capsule by mouth daily 30 capsule 0    rizatriptan (MAXALT) 10 MG tablet PLEASE SEE ATTACHED FOR DETAILED DIRECTIONS 6 tablet 1    naproxen sodium (ANAPROX) 550 MG tablet Take 1 tablet by mouth 2 times daily as needed for Pain (Headache) 14 tablet 3    amitriptyline (ELAVIL) 25 MG tablet Take 1 tablet by mouth nightly 30 tablet 3    lisinopril-hydroCHLOROthiazide (PRINZIDE;ZESTORETIC) 20-12.5 MG per tablet Take 2 tablets by mouth daily 180 tablet 1    ALBUTEROL IN Inhale 2 puffs into the lungs every 12 hours as needed      rOPINIRole (REQUIP) 0.5 MG tablet Take 2 tablets by mouth at bedtime      topiramate (TOPAMAX) 50 MG tablet Take 1 tablet by mouth 2 times daily (Patient not taking: Reported on 7/15/2024) 60 tablet 5    eszopiclone (LUNESTA) 1 MG TABS Take 1 tablet by mouth nightly as needed. (Patient not taking: Reported on 7/15/2024)  0     No current facility-administered medications for this visit.       SOCIAL HISTORY    Reviewed and no change from previous record. Julien  reports that he quit smoking about 35 years ago. His smoking use included cigarettes. He has never used smokeless tobacco.    FAMILY HISTORY:    Reviewed and No change from previous visit  family history is not on file.      PHYSICAL EXAM:      Vitals:    07/26/24

## 2024-08-21 ENCOUNTER — OFFICE VISIT (OUTPATIENT)
Dept: INTERNAL MEDICINE CLINIC | Age: 59
End: 2024-08-21

## 2024-08-21 VITALS
DIASTOLIC BLOOD PRESSURE: 74 MMHG | SYSTOLIC BLOOD PRESSURE: 130 MMHG | WEIGHT: 252 LBS | HEIGHT: 76 IN | BODY MASS INDEX: 30.69 KG/M2 | HEART RATE: 74 BPM | OXYGEN SATURATION: 97 %

## 2024-08-21 DIAGNOSIS — B35.4 TINEA CORPORIS: ICD-10-CM

## 2024-08-21 DIAGNOSIS — Z13.220 SCREENING FOR HYPERLIPIDEMIA: Primary | ICD-10-CM

## 2024-08-21 PROCEDURE — 99213 OFFICE O/P EST LOW 20 MIN: CPT | Performed by: INTERNAL MEDICINE

## 2024-08-21 RX ORDER — TERBINAFINE HYDROCHLORIDE 250 MG/1
250 TABLET ORAL DAILY
Qty: 14 TABLET | Refills: 0 | Status: SHIPPED | OUTPATIENT
Start: 2024-08-21 | End: 2024-09-04

## 2024-08-21 NOTE — PROGRESS NOTES
Visit Information    Have you changed or started any medications since your last visit including any over-the-counter medicines, vitamins, or herbal medicines? no   Are you having any side effects from any of your medications? -  no  Have you stopped taking any of your medications? Is so, why? -  no    Have you seen any other physician or provider since your last visit? No  Have you had any other diagnostic tests since your last visit? No  Have you been seen in the emergency room and/or had an admission to a hospital since we last saw you? No  Have you had your routine dental cleaning in the past 6 months? no    Have you activated your [employer] account? If not, what are your barriers? Yes     Patient Care Team:  Isra Painting MD as PCP - General (Internal Medicine)  Isra Painting MD as PCP - Empaneled Provider  Jim Hightower MD as Consulting Physician (Urology)    Medical History Review  Past Medical, Family, and Social History reviewed and does contribute to the patient presenting condition    Health Maintenance   Topic Date Due    Hepatitis C screen  Never done    Hepatitis B vaccine (1 of 3 - 19+ 3-dose series) Never done    Colorectal Cancer Screen  Never done    Shingles vaccine (1 of 2) Never done    COVID-19 Vaccine (1 - 2023-24 season) Never done    Lipids  07/03/2024    Flu vaccine (1) 08/01/2024    Depression Monitoring  07/26/2025    Diabetes screen  03/14/2026    DTaP/Tdap/Td vaccine (2 - Td or Tdap) 11/26/2027    HIV screen  Completed    Hepatitis A vaccine  Aged Out    Hib vaccine  Aged Out    Polio vaccine  Aged Out    Meningococcal (ACWY) vaccine  Aged Out    Pneumococcal 0-64 years Vaccine  Aged Out    Depression Screen  Discontinued    Prostate Specific Antigen (PSA) Screening or Monitoring  Discontinued

## 2024-08-22 NOTE — PROGRESS NOTES
OFFICE VISIT  PROGRESS NOTE         Date of patient's visit: 8/22/24  Patient's Name:  Julien Batista  YOB: 1965       Patient Care Team:  Isra Painting MD as PCP - General (Internal Medicine)  Is[facility] Painting [facility]  Jim Hightower MD as Consulting Physician (Urology)        SUBJECTIVE     HISTORY           History of present illness     Pertinent details  added to ,       Chief Complaint   Patient presents with    Rash          Encounter Diagnoses   Name Primary?    Screening for hyperlipidemia Yes    Tinea corporis         Symptom / problem          --history obtained from patient.-   Patient complained of rash on foot as well as skin at the axilla  This has been going on for a few weeks  The rash is classical for tinea corporis  With inflamed edges  And central clearing   Patient does have a history past history of tinea corporis        MEDICATIONS:      Current Outpatient Medications   Medication Sig Dispense Refill    terbinafine (LAMISIL) 250 MG tablet Take 1 tablet by mouth daily for 14 days 14 tablet 0    tamsulosin (FLOMAX) 0.4 MG capsule Take 1 capsule by mouth daily 30 capsule 0    rizatriptan (MAXALT) 10 MG tablet PLEASE SEE ATTACHED FOR DETAILED DIRECTIONS 6 tablet 1    naproxen sodium (ANAPROX) 550 MG tablet Take 1 tablet by mouth 2 times daily as needed for Pain (Headache) 14 tablet 3    amitriptyline (ELAVIL) 25 MG tablet Take 1 tablet by mouth nightly 30 tablet 3    lisinopril-hydroCHLOROthiazide (PRINZIDE;ZESTORETIC) 20-12.5 MG per tablet Take 2 tablets by mouth daily 180 tablet 1    ALBUTEROL IN Inhale 2 puffs into the lungs every 12 hours as needed      rOPINIRole (REQUIP) 0.5 MG tablet Take 2 tablets by mouth at bedtime      topiramate (TOPAMAX) 50 MG tablet

## 2024-09-11 RX ORDER — RIZATRIPTAN BENZOATE 10 MG/1
TABLET ORAL
Qty: 6 TABLET | Refills: 1 | Status: SHIPPED | OUTPATIENT
Start: 2024-09-11

## 2024-09-12 ENCOUNTER — OFFICE VISIT (OUTPATIENT)
Dept: INTERNAL MEDICINE CLINIC | Age: 59
End: 2024-09-12
Payer: MEDICAID

## 2024-09-12 VITALS
TEMPERATURE: 97.8 F | BODY MASS INDEX: 30.67 KG/M2 | HEART RATE: 65 BPM | DIASTOLIC BLOOD PRESSURE: 74 MMHG | WEIGHT: 252 LBS | SYSTOLIC BLOOD PRESSURE: 128 MMHG | OXYGEN SATURATION: 96 %

## 2024-09-12 DIAGNOSIS — Z13.220 SCREENING FOR HYPERLIPIDEMIA: ICD-10-CM

## 2024-09-12 DIAGNOSIS — R25.1 EPISODE OF SHAKING: Primary | ICD-10-CM

## 2024-09-12 DIAGNOSIS — Z13.1 ENCOUNTER FOR SCREENING FOR DIABETES MELLITUS: ICD-10-CM

## 2024-09-12 PROCEDURE — 99214 OFFICE O/P EST MOD 30 MIN: CPT | Performed by: INTERNAL MEDICINE

## 2024-09-12 RX ORDER — BLOOD-GLUCOSE METER
1 KIT MISCELLANEOUS DAILY
Qty: 1 KIT | Refills: 0 | Status: SHIPPED | OUTPATIENT
Start: 2024-09-12

## 2024-09-13 ENCOUNTER — TELEPHONE (OUTPATIENT)
Dept: INTERNAL MEDICINE CLINIC | Age: 59
End: 2024-09-13

## 2024-09-17 ENCOUNTER — HOSPITAL ENCOUNTER (OUTPATIENT)
Age: 59
Setting detail: SPECIMEN
Discharge: HOME | End: 2024-09-17

## 2024-09-17 DIAGNOSIS — Z13.1 ENCOUNTER FOR SCREENING FOR DIABETES MELLITUS: ICD-10-CM

## 2024-09-17 DIAGNOSIS — Z13.220 SCREENING FOR HYPERLIPIDEMIA: ICD-10-CM

## 2024-09-17 LAB
CHOLEST SERPL-MCNC: 196 MG/DL (ref 0–199)
CHOLESTEROL/HDL RATIO: 5
GLUCOSE P FAST SERPL-MCNC: 129 MG/DL (ref 74–99)
HDLC SERPL-MCNC: 36 MG/DL
LDLC SERPL CALC-MCNC: 134 MG/DL (ref 0–100)
TRIGL SERPL-MCNC: 129 MG/DL
VLDLC SERPL CALC-MCNC: 26 MG/DL

## 2024-09-20 ENCOUNTER — TELEPHONE (OUTPATIENT)
Dept: INTERNAL MEDICINE CLINIC | Age: 59
End: 2024-09-20

## 2024-09-20 DIAGNOSIS — R73.01 ELEVATED FASTING GLUCOSE: Primary | ICD-10-CM

## 2024-09-23 ENCOUNTER — HOSPITAL ENCOUNTER (OUTPATIENT)
Age: 59
Setting detail: SPECIMEN
Discharge: HOME | End: 2024-09-23

## 2024-09-23 DIAGNOSIS — R73.01 ELEVATED FASTING GLUCOSE: ICD-10-CM

## 2024-09-23 LAB
EST. AVERAGE GLUCOSE BLD GHB EST-MCNC: 114 MG/DL
HBA1C MFR BLD: 5.6 % (ref 4–6)

## 2024-09-24 ENCOUNTER — HOSPITAL ENCOUNTER (OUTPATIENT)
Age: 59
Setting detail: SPECIMEN
Discharge: HOME | End: 2024-09-24

## 2024-09-24 ENCOUNTER — OFFICE VISIT (OUTPATIENT)
Dept: INTERNAL MEDICINE CLINIC | Age: 59
End: 2024-09-24

## 2024-09-24 VITALS
DIASTOLIC BLOOD PRESSURE: 84 MMHG | HEART RATE: 66 BPM | OXYGEN SATURATION: 99 % | WEIGHT: 246 LBS | BODY MASS INDEX: 29.96 KG/M2 | HEIGHT: 76 IN | SYSTOLIC BLOOD PRESSURE: 128 MMHG

## 2024-09-24 DIAGNOSIS — Z13.220 SCREENING FOR HYPERLIPIDEMIA: ICD-10-CM

## 2024-09-24 DIAGNOSIS — D22.9 CHANGE IN SKIN MOLE: Primary | ICD-10-CM

## 2024-09-24 DIAGNOSIS — D22.9 CHANGE IN SKIN MOLE: ICD-10-CM

## 2024-09-24 DIAGNOSIS — Z87.891 PERSONAL HISTORY OF TOBACCO USE: ICD-10-CM

## 2024-09-24 DIAGNOSIS — R42 VERTIGO: ICD-10-CM

## 2024-09-24 DIAGNOSIS — H61.23 BILATERAL IMPACTED CERUMEN: ICD-10-CM

## 2024-09-24 DIAGNOSIS — Z76.89 ENCOUNTER TO ESTABLISH CARE: ICD-10-CM

## 2024-09-24 DIAGNOSIS — Z11.59 NEED FOR HEPATITIS C SCREENING TEST: ICD-10-CM

## 2024-09-24 DIAGNOSIS — B35.4 TINEA CORPORIS: ICD-10-CM

## 2024-09-24 LAB
BASOPHILS # BLD: 0.05 K/UL (ref 0–0.2)
BASOPHILS NFR BLD: 1 % (ref 0–2)
EOSINOPHIL # BLD: 0.09 K/UL (ref 0–0.44)
EOSINOPHILS RELATIVE PERCENT: 2 % (ref 1–4)
ERYTHROCYTE [DISTWIDTH] IN BLOOD BY AUTOMATED COUNT: 12.8 % (ref 11.8–14.4)
HCT VFR BLD AUTO: 48.6 % (ref 40.7–50.3)
HCV AB SERPL QL IA: NONREACTIVE
HGB BLD-MCNC: 15.9 G/DL (ref 13–17)
IMM GRANULOCYTES # BLD AUTO: <0.03 K/UL (ref 0–0.3)
IMM GRANULOCYTES NFR BLD: 0 %
LYMPHOCYTES NFR BLD: 1.74 K/UL (ref 1.1–3.7)
LYMPHOCYTES RELATIVE PERCENT: 34 % (ref 24–43)
MCH RBC QN AUTO: 31.3 PG (ref 25.2–33.5)
MCHC RBC AUTO-ENTMCNC: 32.7 G/DL (ref 28.4–34.8)
MCV RBC AUTO: 95.7 FL (ref 82.6–102.9)
MONOCYTES NFR BLD: 0.36 K/UL (ref 0.1–1.2)
MONOCYTES NFR BLD: 7 % (ref 3–12)
NEUTROPHILS NFR BLD: 56 % (ref 36–65)
NEUTS SEG NFR BLD: 2.93 K/UL (ref 1.5–8.1)
NRBC BLD-RTO: 0 PER 100 WBC
PLATELET # BLD AUTO: 256 K/UL (ref 138–453)
PMV BLD AUTO: 10.7 FL (ref 8.1–13.5)
RBC # BLD AUTO: 5.08 M/UL (ref 4.21–5.77)
WBC OTHER # BLD: 5.2 K/UL (ref 3.5–11.3)

## 2024-09-24 ASSESSMENT — ENCOUNTER SYMPTOMS
SHORTNESS OF BREATH: 0
COUGH: 0
VOMITING: 0
CONSTIPATION: 0
DIARRHEA: 0
ALLERGIC/IMMUNOLOGIC NEGATIVE: 1
ABDOMINAL PAIN: 0
NAUSEA: 0
WHEEZING: 0
BACK PAIN: 0

## 2024-10-10 ENCOUNTER — TELEPHONE (OUTPATIENT)
Dept: INTERNAL MEDICINE CLINIC | Age: 59
End: 2024-10-10

## 2024-10-10 RX ORDER — ALBUTEROL SULFATE 90 UG/1
2 INHALANT RESPIRATORY (INHALATION) EVERY 4 HOURS PRN
Qty: 18 G | Refills: 5 | Status: SHIPPED | OUTPATIENT
Start: 2024-10-10

## 2024-10-10 RX ORDER — GLUCOSAMINE HCL/CHONDROITIN SU 500-400 MG
CAPSULE ORAL
Qty: 100 STRIP | Refills: 0 | Status: SHIPPED | OUTPATIENT
Start: 2024-10-10

## 2024-10-10 RX ORDER — LANCETS 30 GAUGE
EACH MISCELLANEOUS
Qty: 100 EACH | Refills: 0 | Status: SHIPPED | OUTPATIENT
Start: 2024-10-10

## 2024-10-10 NOTE — TELEPHONE ENCOUNTER
Patient called and stated that he received the glucose kit but her needs strips and lancets. He is also in need of a refill for his in [facility] [place] due to seasonal allergies. Please advise.

## 2024-10-23 RX ORDER — RIZATRIPTAN BENZOATE 10 MG/1
TABLET ORAL
Qty: 6 TABLET | Refills: 1 | Status: SHIPPED | OUTPATIENT
Start: 2024-10-23

## 2024-10-25 ENCOUNTER — OFFICE VISIT (OUTPATIENT)
Dept: INTERNAL MEDICINE CLINIC | Age: 59
End: 2024-10-25
Payer: MEDICAID

## 2024-10-25 VITALS
SYSTOLIC BLOOD PRESSURE: 130 MMHG | HEART RATE: 68 BPM | BODY MASS INDEX: 30.32 KG/M2 | OXYGEN SATURATION: 96 % | DIASTOLIC BLOOD PRESSURE: 90 MMHG | WEIGHT: 249 LBS | HEIGHT: 76 IN

## 2024-10-25 DIAGNOSIS — R52 SHOOTING PAIN: ICD-10-CM

## 2024-10-25 DIAGNOSIS — R55 PRE-SYNCOPE: ICD-10-CM

## 2024-10-25 DIAGNOSIS — Z86.79 H/O SUPRAVENTRICULAR TACHYCARDIA: ICD-10-CM

## 2024-10-25 DIAGNOSIS — G43.009 MIGRAINE WITHOUT AURA AND WITHOUT STATUS MIGRAINOSUS, NOT INTRACTABLE: Primary | ICD-10-CM

## 2024-10-25 PROCEDURE — 99214 OFFICE O/P EST MOD 30 MIN: CPT

## 2024-10-25 ASSESSMENT — ENCOUNTER SYMPTOMS
SHORTNESS OF BREATH: 0
WHEEZING: 0
ABDOMINAL PAIN: 0
BACK PAIN: 0
NAUSEA: 0
COUGH: 0
DIARRHEA: 0
ALLERGIC/IMMUNOLOGIC NEGATIVE: 1
VOMITING: 0
CONSTIPATION: 0

## 2024-10-25 NOTE — PROGRESS NOTES
[facility]  [address] [address]  Dept: [phone]  Dept Fax: [phone]    OFFICE VISIT NOTE  Date of patient's visit: 10/25/2024  Patient's Name:  Julien Batista YOB: 1965            Patient Care Team:  Cesar Yoo MD as PCP - General (Internal Medicine)  Cesar Yoo MD as PCP - Empaneled Provider  Jim Hightower MD as Consulting Physician (Urology)  _________________________________________    ________________________________________________  Chief Complaint:   Hyperglycemia (Has been keeping a log)    _______________________________________________  History of Presenting Illness:  History was obtained from the patient. Julien Batista is a 59 y.o. male.     BP: Normal.  HR: Normal.    Asked by his previous PCP due to low down blood sugar levels.  He brings his blood sugar levels.  It has been running around 140s to 150s.  This measurements are usually after eating, they are not fasting.  Most recent A1c was 5.6.  The patient is not prediabetic.  Discussed with the patient that he needs to take care of his diet.  And he needs to be physically active, aerobic exercise at least 150 minutes/week.    But patient states that he is still having dizziness.  Which is a chronic thing, had follows up with neurology and ENT in the past.  Has an upcoming appointment with Dr. Richards for second opinion in January 2025.  He states that he keeps himself hydrated.  Complaining of shooting pain down his arms and legs nowadays.  Has history of hypertension, supposed to be on Coreg and Prinzide.  Has not been taking them along with Elavil, he thinks as it is causing symptoms like dizziness, and GI issues..  But dizziness is still persistent despite being off of it for a month.  Talked with the patient that he started taking them and monitor for symptoms.  Also told the patient that he needs to follow-up with cardiology.  He wants to see a new

## 2024-11-04 ENCOUNTER — HOSPITAL ENCOUNTER (OUTPATIENT)
Age: 59
Discharge: HOME | End: 2024-11-06
Payer: MEDICAID

## 2024-11-04 ENCOUNTER — HOSPITAL ENCOUNTER (OUTPATIENT)
Dept: MRI IMAGING | Age: 59
Discharge: HOME | End: 2024-11-06
Payer: MEDICAID

## 2024-11-04 VITALS
WEIGHT: 249 LBS | HEART RATE: 68 BPM | SYSTOLIC BLOOD PRESSURE: 130 MMHG | HEIGHT: 76 IN | DIASTOLIC BLOOD PRESSURE: 90 MMHG | BODY MASS INDEX: 30.32 KG/M2

## 2024-11-04 DIAGNOSIS — R55 PRE-SYNCOPE: ICD-10-CM

## 2024-11-04 DIAGNOSIS — R52 SHOOTING PAIN: ICD-10-CM

## 2024-11-04 DIAGNOSIS — Z86.79 H/O SUPRAVENTRICULAR TACHYCARDIA: ICD-10-CM

## 2024-11-04 DIAGNOSIS — G43.009 MIGRAINE WITHOUT AURA AND WITHOUT STATUS MIGRAINOSUS, NOT INTRACTABLE: ICD-10-CM

## 2024-11-04 LAB
EKG ATRIAL RATE: 94 BPM
EKG P AXIS: 43 DEGREES
EKG P-R INTERVAL: 166 MS
EKG Q-T INTERVAL: 366 MS
EKG QRS DURATION: 114 MS
EKG QTC CALCULATION (BAZETT): 457 MS
EKG R AXIS: 35 DEGREES
EKG T AXIS: 27 DEGREES
EKG VENTRICULAR RATE: 94 BPM

## 2024-11-04 PROCEDURE — 93005 ELECTROCARDIOGRAM TRACING: CPT

## 2024-11-04 PROCEDURE — 93010 ELECTROCARDIOGRAM REPORT: CPT | Performed by: INTERNAL MEDICINE

## 2024-11-04 PROCEDURE — 93306 TTE W/DOPPLER COMPLETE: CPT

## 2024-11-04 PROCEDURE — 70551 MRI BRAIN STEM W/O DYE: CPT

## 2024-11-05 DIAGNOSIS — I77.810 DILATED AORTIC ROOT (HCC): Primary | ICD-10-CM

## 2024-11-05 LAB
ECHO AO ROOT DIAM: 4 CM
ECHO AO ROOT INDEX: 1.65 CM/M2
ECHO AV AREA PEAK VELOCITY: 3.4 CM2
ECHO AV AREA VTI: 3 CM2
ECHO AV AREA/BSA PEAK VELOCITY: 1.4 CM2/M2
ECHO AV AREA/BSA VTI: 1.2 CM2/M2
ECHO AV MEAN GRADIENT: 6 MMHG
ECHO AV MEAN VELOCITY: 1.2 M/S
ECHO AV PEAK GRADIENT: 9 MMHG
ECHO AV PEAK VELOCITY: 1.5 M/S
ECHO AV VELOCITY RATIO: 0.93
ECHO AV VTI: 29.4 CM
ECHO BSA: 2.46 M2
ECHO LA AREA 2C: 17.9 CM2
ECHO LA AREA 4C: 15.2 CM2
ECHO LA DIAMETER INDEX: 1.69 CM/M2
ECHO LA DIAMETER: 4.1 CM
ECHO LA MAJOR AXIS: 5.4 CM
ECHO LA MINOR AXIS: 5.9 CM
ECHO LA TO AORTIC ROOT RATIO: 1.03
ECHO LA VOL BP: 40 ML (ref 18–58)
ECHO LA VOL MOD A2C: 44 ML (ref 18–58)
ECHO LA VOL MOD A4C: 34 ML (ref 18–58)
ECHO LA VOL/BSA BIPLANE: 16 ML/M2 (ref 16–34)
ECHO LA VOLUME INDEX MOD A2C: 18 ML/M2 (ref 16–34)
ECHO LA VOLUME INDEX MOD A4C: 14 ML/M2 (ref 16–34)
ECHO LV E' LATERAL VELOCITY: 10.3 CM/S
ECHO LV E' SEPTAL VELOCITY: 9 CM/S
ECHO LV EF PHYSICIAN: 65 %
ECHO LV FRACTIONAL SHORTENING: 25 % (ref 28–44)
ECHO LV INTERNAL DIMENSION DIASTOLE INDEX: 1.65 CM/M2
ECHO LV INTERNAL DIMENSION DIASTOLIC: 4 CM (ref 4.2–5.9)
ECHO LV INTERNAL DIMENSION SYSTOLIC INDEX: 1.23 CM/M2
ECHO LV INTERNAL DIMENSION SYSTOLIC: 3 CM
ECHO LV IVSD: 1.6 CM (ref 0.6–1)
ECHO LV MASS 2D: 257.9 G (ref 88–224)
ECHO LV MASS INDEX 2D: 106.1 G/M2 (ref 49–115)
ECHO LV POSTERIOR WALL DIASTOLIC: 1.6 CM (ref 0.6–1)
ECHO LV RELATIVE WALL THICKNESS RATIO: 0.8
ECHO LVOT AREA: 3.8 CM2
ECHO LVOT AV VTI INDEX: 0.8
ECHO LVOT DIAM: 2.2 CM
ECHO LVOT MEAN GRADIENT: 4 MMHG
ECHO LVOT PEAK GRADIENT: 8 MMHG
ECHO LVOT PEAK VELOCITY: 1.4 M/S
ECHO LVOT STROKE VOLUME INDEX: 36.7 ML/M2
ECHO LVOT SV: 89.3 ML
ECHO LVOT VTI: 23.5 CM
ECHO MV A VELOCITY: 0.89 M/S
ECHO MV E DECELERATION TIME (DT): 236 MS
ECHO MV E VELOCITY: 0.65 M/S
ECHO MV E/A RATIO: 0.73
ECHO MV E/E' LATERAL: 6.31
ECHO MV E/E' RATIO (AVERAGED): 6.77
ECHO MV E/E' SEPTAL: 7.22
ECHO RA AREA 4C: 13.1 CM2
ECHO RA END SYSTOLIC VOLUME APICAL 4 CHAMBER INDEX BSA: 14 ML/M2
ECHO RA VOLUME: 34 ML
ECHO RV TAPSE: 1.7 CM (ref 1.7–?)

## 2024-11-11 RX ORDER — RIZATRIPTAN BENZOATE 10 MG/1
TABLET ORAL
Qty: 6 TABLET | Refills: 1 | Status: SHIPPED | OUTPATIENT
Start: 2024-11-11

## 2024-11-13 NOTE — TELEPHONE ENCOUNTER
Patient stopp[ed in today stating that the refill for Maxalt was tablets. He ususally gets the dissolving kind and preferrs that be sent in for him.    Patient also informed of message to get further refills from a neurologist and he states he does not see that physician until December 11th.    Please advise

## 2024-11-13 NOTE — TELEPHONE ENCOUNTER
No medications in database which is dissolving tablet  If there is one on database, please pend for me. And will refill till he sees neurologist  He could call his neurologist

## 2024-11-22 ENCOUNTER — HOSPITAL ENCOUNTER (EMERGENCY)
Age: 59
Discharge: HOME | End: 2024-11-22
Attending: STUDENT IN AN ORGANIZED HEALTH CARE EDUCATION/TRAINING PROGRAM
Payer: MEDICAID

## 2024-11-22 VITALS
SYSTOLIC BLOOD PRESSURE: 139 MMHG | OXYGEN SATURATION: 93 % | HEIGHT: 76 IN | BODY MASS INDEX: 29.22 KG/M2 | TEMPERATURE: 97.2 F | HEART RATE: 71 BPM | RESPIRATION RATE: 18 BRPM | DIASTOLIC BLOOD PRESSURE: 101 MMHG | WEIGHT: 240 LBS

## 2024-11-22 DIAGNOSIS — G43.809 OTHER MIGRAINE WITHOUT STATUS MIGRAINOSUS, NOT INTRACTABLE: Primary | ICD-10-CM

## 2024-11-22 PROCEDURE — 6360000002 HC RX W HCPCS: Performed by: STUDENT IN AN ORGANIZED HEALTH CARE EDUCATION/TRAINING PROGRAM

## 2024-11-22 PROCEDURE — 99284 EMERGENCY DEPT VISIT MOD MDM: CPT

## 2024-11-22 PROCEDURE — 93005 ELECTROCARDIOGRAM TRACING: CPT | Performed by: STUDENT IN AN ORGANIZED HEALTH CARE EDUCATION/TRAINING PROGRAM

## 2024-11-22 PROCEDURE — 96372 THER/PROPH/DIAG INJ SC/IM: CPT

## 2024-11-22 PROCEDURE — 6370000000 HC RX 637 (ALT 250 FOR IP): Performed by: STUDENT IN AN ORGANIZED HEALTH CARE EDUCATION/TRAINING PROGRAM

## 2024-11-22 RX ORDER — DEXAMETHASONE SODIUM PHOSPHATE 10 MG/ML
8 INJECTION, SOLUTION INTRAMUSCULAR; INTRAVENOUS ONCE
Status: COMPLETED | OUTPATIENT
Start: 2024-11-22 | End: 2024-11-22

## 2024-11-22 RX ORDER — PROMETHAZINE HYDROCHLORIDE 25 MG/1
25 TABLET ORAL EVERY 6 HOURS PRN
Qty: 20 TABLET | Refills: 0 | Status: SHIPPED | OUTPATIENT
Start: 2024-11-22 | End: 2024-11-29

## 2024-11-22 RX ORDER — DROPERIDOL 2.5 MG/ML
1.25 INJECTION, SOLUTION INTRAMUSCULAR; INTRAVENOUS ONCE
Status: COMPLETED | OUTPATIENT
Start: 2024-11-22 | End: 2024-11-22

## 2024-11-22 RX ORDER — ACETAMINOPHEN 500 MG
1000 TABLET ORAL ONCE
Status: COMPLETED | OUTPATIENT
Start: 2024-11-22 | End: 2024-11-22

## 2024-11-22 RX ORDER — RIZATRIPTAN BENZOATE 10 MG/1
10 TABLET ORAL EVERY 12 HOURS PRN
Qty: 10 TABLET | Refills: 0 | Status: SHIPPED | OUTPATIENT
Start: 2024-11-22 | End: 2024-11-29

## 2024-11-22 RX ORDER — DROPERIDOL 2.5 MG/ML
1.25 INJECTION, SOLUTION INTRAMUSCULAR; INTRAVENOUS ONCE
Status: DISCONTINUED | OUTPATIENT
Start: 2024-11-22 | End: 2024-11-22

## 2024-11-22 RX ADMIN — ACETAMINOPHEN 1000 MG: 500 TABLET ORAL at 11:39

## 2024-11-22 RX ADMIN — DEXAMETHASONE SODIUM PHOSPHATE 8 MG: 10 INJECTION INTRAMUSCULAR; INTRAVENOUS at 11:40

## 2024-11-22 RX ADMIN — DROPERIDOL 1.25 MG: 2.5 INJECTION, SOLUTION INTRAMUSCULAR; INTRAVENOUS at 11:40

## 2024-11-22 ASSESSMENT — LIFESTYLE VARIABLES
HOW OFTEN DO YOU HAVE A DRINK CONTAINING ALCOHOL: NEVER
HOW MANY STANDARD DRINKS CONTAINING ALCOHOL DO YOU HAVE ON A TYPICAL DAY: PATIENT DOES NOT DRINK

## 2024-11-22 ASSESSMENT — PAIN SCALES - GENERAL
PAINLEVEL_OUTOF10: 5
PAINLEVEL_OUTOF10: 8
PAINLEVEL_OUTOF10: 7

## 2024-11-22 ASSESSMENT — ENCOUNTER SYMPTOMS
ABDOMINAL PAIN: 0
SHORTNESS OF BREATH: 0

## 2024-11-22 NOTE — DISCHARGE INSTRUCTIONS
Please follow-up with your neurologist  If you have a return of a migraine, I would recommend taking 1 g of Tylenol, either 600 mg of Motrin or 440 mg of naproxen and take it with a dose of Phenergan as well.  After that please try to sleep.  If your headache persists, then you may take 1 dose of the Maxalt.  If it has not improved, you may take it again in a few hours but do not take more than a total of 20 mg of the Maxalt within 24 hours.  If you take 2 doses of the Maxalt within 1 day and you do not have any improvement in your headache, return to the ER  Please ask your neurologist about Ubrelvy and Nurtec  And again if you have any worsening of symptoms that are not improved at home, return to the ER

## 2024-11-22 NOTE — ED PROVIDER NOTES
[facility]  Emergency Department Encounter  Emergency Medicine Physician     Pt Name: Julien Batista  MRN: [id number]  Birthdate 1965  Date of evaluation: 11/22/24  PCP:  Cesar Yoo MD    CHIEF COMPLAINT       Chief Complaint   Patient presents with    Dizziness     For 2 yrs and 3 months, worse today d/t migraine    Generalized Body Aches     Every joint in entire body       HISTORY OF PRESENT ILLNESS  (Location/Symptom, Timing/Onset, Context/Setting, Quality, Duration, Modifying Factors, Severity.)    Julien Batista is a 59 y.o. male who presents with complaint of migraine.  Patient states has been having a migraine ongoing since this morning.  States that he gets migraines about twice a week.  He has an upcoming appointment in 2 weeks with a neurologist.  States that usually he takes naproxen, and Maxalt which takes care of his migraine.  He states however that he is out of his Maxalt.  States that he took 440 mg of naproxen this morning but no other medications.  States that it helped slightly but no significant improvement.  States that he gets dizzy when he has migraines and has had pain.  He states he is starting to have some photophobia and phonophobia.  Denies any neck pain or neck stiffness.  Denies any fevers or chills.  Denies any trauma.  Denies any chest pain or shortness of breath or palpitations.  States this feels like a typical migraine for him.  States it was slow onset, no thunderclap.        PAST MEDICAL / SURGICAL / SOCIAL / FAMILY HISTORY    has a past medical history of Arthritis, Asthma, Back pain, Fibromyalgia, H/O cardiac radiofrequency ablation, Headache(784.0), PVC (premature ventricular contraction), Restless legs syndrome, Sleep apnea, SVT (supraventricular tachycardia) (HCC), and Vertigo.     has a past surgical history that includes Cholecystectomy; Rotator cuff repair (Left); Knee arthroscopy (Left); Ankle surgery (Right); Tonsillectomy; Cardiac  Considered but Do Not Suspect: meningitis        3)  Treatment and Disposition     ED Course as of 11/23/24 2054 Fri Nov 22, 2024   1240 Patient evaluated.  States he is feeling better.  States his headache is nearly gone.  Dizziness gone.  Will plan for discharge [AP]      ED Course User Index  [AP] Reece Graves, DO       Patient repeat assessment, shared decision making, and disposition discussion:  DC as above      I have reviewed discharge instructions with the patient.  The patient verbalized understanding.      MIPS:  [None]    Social determinants of health impacting treatment or disposition:  vadimn    Code Status Discussion:  Did not have Code status discussion since patient being discharged      PROCEDURES:  none    CONSULTS:  None    CRITICAL CARE:  There was a high probability of clinically significant/life threatening deterioration in this patient's condition which required my urgent intervention.  Total critical care time was 14 minutes.  This excludes any time for separately reportable procedures.     FINAL IMPRESSION     1. Other migraine without status migrainosus, not intractable          DISPOSITION / PLAN   DISPOSITION Decision To Discharge 11/22/2024 12:47:08 PM           Evaluation and treatment course in the ED, and plan of care upon discharge was discussed in length with the patient/patient representative. The patient/patient representative understands that at this time there is no evidence for a more malignant underlying process, but also understands that early in the process of an illness or injury, an emergency department work-up can be falsely reassuring. Patient/patient representative had no further questions prior to being discharged and understands that worsening, changing or persistent symptoms should prompt a immediate call or follow-up with their primary care physician or return to the emergency department. Any changes to existing medications or new prescriptions were reviewed

## 2024-11-25 LAB
EKG ATRIAL RATE: 68 BPM
EKG P AXIS: 7 DEGREES
EKG P-R INTERVAL: 182 MS
EKG Q-T INTERVAL: 424 MS
EKG QRS DURATION: 122 MS
EKG QTC CALCULATION (BAZETT): 450 MS
EKG R AXIS: 39 DEGREES
EKG T AXIS: 14 DEGREES
EKG VENTRICULAR RATE: 68 BPM

## 2024-11-25 PROCEDURE — 93010 ELECTROCARDIOGRAM REPORT: CPT | Performed by: INTERNAL MEDICINE

## 2024-12-11 ENCOUNTER — OFFICE VISIT (OUTPATIENT)
Dept: NEUROLOGY | Age: 59
End: 2024-12-11
Payer: MEDICAID

## 2024-12-11 VITALS
HEIGHT: 76 IN | SYSTOLIC BLOOD PRESSURE: 170 MMHG | DIASTOLIC BLOOD PRESSURE: 126 MMHG | HEART RATE: 72 BPM | BODY MASS INDEX: 29.71 KG/M2 | WEIGHT: 244 LBS

## 2024-12-11 DIAGNOSIS — H81.12 BENIGN PAROXYSMAL POSITIONAL VERTIGO OF LEFT EAR: ICD-10-CM

## 2024-12-11 DIAGNOSIS — G43.E19 INTRACTABLE CHRONIC MIGRAINE WITH AURA AND WITHOUT STATUS MIGRAINOSUS: Primary | ICD-10-CM

## 2024-12-11 PROCEDURE — 99205 OFFICE O/P NEW HI 60 MIN: CPT | Performed by: STUDENT IN AN ORGANIZED HEALTH CARE EDUCATION/TRAINING PROGRAM

## 2024-12-11 RX ORDER — DULOXETIN HYDROCHLORIDE 30 MG/1
30 CAPSULE, DELAYED RELEASE ORAL DAILY
Qty: 30 CAPSULE | Refills: 3 | Status: SHIPPED | OUTPATIENT
Start: 2024-12-11

## 2024-12-11 RX ORDER — RIZATRIPTAN BENZOATE 10 MG/1
10 TABLET ORAL EVERY 12 HOURS PRN
Qty: 10 TABLET | Refills: 0 | Status: SHIPPED | OUTPATIENT
Start: 2024-12-11 | End: 2024-12-18

## 2024-12-11 RX ORDER — MAGNESIUM OXIDE 400 MG/1
400 TABLET ORAL DAILY
Qty: 30 TABLET | Refills: 1 | Status: SHIPPED | OUTPATIENT
Start: 2024-12-11

## 2024-12-11 NOTE — PROGRESS NOTES
Does not apply route daily 9/12/24  Yes Isra Painting MD   tamsulosin (FLOMAX) 0.4 MG capsule Take 1 capsule by mouth daily 5/23/24  Yes Isra Painting MD   naproxen sodium (ANAPROX) 550 MG tablet Take 1 tablet by mouth 2 times daily as needed for Pain (Headache) 5/23/24  Yes Isra Painting MD   rOPINIRole (REQUIP) 0.5 MG tablet Take 2 tablets by mouth at bedtime   Yes ProviderJulian MD   albuterol sulfate HFA (PROVENTIL;VENTOLIN;PROAIR) 108 (90 Base) MCG/ACT inhaler Inhale 2 puffs into the lungs every 4 hours as needed for Wheezing or Shortness of Breath  Patient not taking: Reported on 12/11/2024 10/10/24   Cesar Yoo MD   carbamide peroxide (DEBROX) 6.5 % otic solution Place 5 drops into both ears twice daily for 5 days  Patient not taking: Reported on 12/11/2024 9/24/24   Cesar Yoo MD   amitriptyline (ELAVIL) 25 MG tablet Take 1 tablet by mouth nightly  Patient not taking: Reported on 12/11/2024 3/26/24   Ray Partida MD   lisinopril-hydroCHLOROthiazide (PRINZIDE;ZESTORETIC) 20-12.5 MG per tablet Take 2 tablets by mouth daily  Patient not taking: Reported on 12/11/2024 10/25/23   Isra Painting MD       ALLERGIES:   Allergies   Allergen Reactions    Seasonal Itching, Dermatitis and Rash         ROS:   The patient did not report additional concerns. There were no symptoms suggestive of cardiac, gastrointestinal, or endocrinal dysfunction.   No abnormal skin findings.    No symptoms suggestive of respiratory, genitourinary or muskuloskeletal dysfunction.           Physical Exam:  BP (!) 170/126 (Site: Right Upper Arm, Position: Sitting, Cuff Size: Large Adult)   Pulse 72   Ht 1.93 m (6' 4\")   Wt 110.7 kg (244 lb)   BMI 29.70 kg/m²   General Appearance: In no acute distress  Head: \"Normocephalic, without obvious abnormality\",\"atraumatic\"    Mental Status: Orientation oriented to person, place, problem, and time.   Mood/Affect: appropriate mood and affect.  Attention

## 2024-12-12 ENCOUNTER — OFFICE VISIT (OUTPATIENT)
Dept: INTERNAL MEDICINE CLINIC | Age: 59
End: 2024-12-12
Payer: MEDICAID

## 2024-12-12 ENCOUNTER — HOSPITAL ENCOUNTER (OUTPATIENT)
Facility: CLINIC | Age: 59
Discharge: HOME | End: 2024-12-14
Payer: MEDICAID

## 2024-12-12 ENCOUNTER — HOSPITAL ENCOUNTER (OUTPATIENT)
Dept: GENERAL RADIOLOGY | Facility: CLINIC | Age: 59
Discharge: HOME | End: 2024-12-14
Payer: MEDICAID

## 2024-12-12 VITALS
WEIGHT: 245 LBS | SYSTOLIC BLOOD PRESSURE: 152 MMHG | BODY MASS INDEX: 29.83 KG/M2 | OXYGEN SATURATION: 96 % | HEIGHT: 76 IN | DIASTOLIC BLOOD PRESSURE: 108 MMHG | HEART RATE: 90 BPM

## 2024-12-12 DIAGNOSIS — E78.2 MIXED HYPERLIPIDEMIA: ICD-10-CM

## 2024-12-12 DIAGNOSIS — I10 ESSENTIAL HYPERTENSION: ICD-10-CM

## 2024-12-12 DIAGNOSIS — M25.561 CHRONIC PAIN OF BOTH KNEES: ICD-10-CM

## 2024-12-12 DIAGNOSIS — M25.562 CHRONIC PAIN OF BOTH KNEES: ICD-10-CM

## 2024-12-12 DIAGNOSIS — G89.29 CHRONIC PAIN OF BOTH KNEES: ICD-10-CM

## 2024-12-12 DIAGNOSIS — M25.521 PAIN OF BOTH ELBOWS: Primary | ICD-10-CM

## 2024-12-12 DIAGNOSIS — M25.521 PAIN OF BOTH ELBOWS: ICD-10-CM

## 2024-12-12 DIAGNOSIS — Z87.891 FORMER SMOKER: ICD-10-CM

## 2024-12-12 DIAGNOSIS — M25.522 PAIN OF BOTH ELBOWS: ICD-10-CM

## 2024-12-12 DIAGNOSIS — M25.522 PAIN OF BOTH ELBOWS: Primary | ICD-10-CM

## 2024-12-12 PROCEDURE — 73080 X-RAY EXAM OF ELBOW: CPT

## 2024-12-12 PROCEDURE — 99214 OFFICE O/P EST MOD 30 MIN: CPT

## 2024-12-12 PROCEDURE — 73562 X-RAY EXAM OF KNEE 3: CPT

## 2024-12-12 PROCEDURE — 3077F SYST BP >= 140 MM HG: CPT

## 2024-12-12 PROCEDURE — 3080F DIAST BP >= 90 MM HG: CPT

## 2024-12-12 RX ORDER — AMLODIPINE BESYLATE 5 MG/1
5 TABLET ORAL DAILY
Qty: 90 TABLET | Refills: 1 | Status: SHIPPED | OUTPATIENT
Start: 2024-12-12

## 2024-12-12 RX ORDER — ATORVASTATIN CALCIUM 10 MG/1
10 TABLET, FILM COATED ORAL DAILY
Qty: 90 TABLET | Refills: 1 | Status: SHIPPED | OUTPATIENT
Start: 2024-12-12

## 2024-12-12 NOTE — PROGRESS NOTES
[facility]  [address] [address]  Dept: [phone]  Dept Fax: [phone]    OFFICE VISIT NOTE  Date of patient's visit: 12/17/2024  Patient's Name:  Julien Batista YOB: 1965            Patient Care Team:  Cesar Yoo MD as PCP - General (Internal Medicine)  Cesar Yoo MD as PCP - Empaneled Provider  Jim Hightower MD as Consulting Physician (Urology)  _________________________________________    ________________________________________________  Chief Complaint:   Elbow Pain (Moving stuff and heard a pop, instant pain)    _______________________________________________  History of Presenting Illness:  History was obtained from the patient. Julien Batista is a 59 y.o. male.     Elbow pain   Rt and left side   Since 2 weeks   He was moving things in the garage  Holt a snap when lifting objects  No trauma/ accident  No medication recently   Rest makes it better  Trying to wash hair, hurts it more  Usually pain occurs when he starts to lift something or when twisting movement  First episode    HTN:  Diagnosed: many yrs ago  Medications:  was on prinzide, stopped it on his own ; not tolerating the medications well, was feeling nauseous when took it , so stopped it. Got better after stopping it.   Associated symptoms: no chest pain, blurry vision, headache.   Checks blood pressure at home: Yes; controlled  Diet: Compliant; counseled on low salt diet, low fat and low cholesterol.   Exercise: No  Body mass index is 29.82 kg/m².    BP Readings from Last 3 Encounters:   12/12/24 (!) 152/108   12/11/24 (!) 170/126   11/22/24 (!) 139/101       Lab Results   Component Value Date    HGB 15.9 09/24/2024    LABA1C 5.6 09/23/2024    CHOL 196 09/17/2024    HDL 36 (L) 09/17/2024     (H) 09/17/2024    TRIG 129 09/17/2024    CREATININE 1.2 12/13/2024     _____________________________________________________  Past Medical/Surgical

## 2024-12-13 ENCOUNTER — HOSPITAL ENCOUNTER (OUTPATIENT)
Age: 59
Setting detail: SPECIMEN
Discharge: HOME | End: 2024-12-13

## 2024-12-13 DIAGNOSIS — I10 ESSENTIAL HYPERTENSION: ICD-10-CM

## 2024-12-13 DIAGNOSIS — E78.2 MIXED HYPERLIPIDEMIA: ICD-10-CM

## 2024-12-13 LAB
ALBUMIN SERPL-MCNC: 4.4 G/DL (ref 3.5–5.2)
ALBUMIN/GLOB SERPL: 1.6 {RATIO} (ref 1–2.5)
ALP SERPL-CCNC: 83 U/L (ref 40–129)
ALT SERPL-CCNC: 34 U/L (ref 10–50)
ANION GAP SERPL CALCULATED.3IONS-SCNC: 13 MMOL/L (ref 9–16)
AST SERPL-CCNC: 25 U/L (ref 10–50)
BILIRUB SERPL-MCNC: 0.7 MG/DL (ref 0–1.2)
BUN SERPL-MCNC: 13 MG/DL (ref 6–20)
CALCIUM SERPL-MCNC: 9.7 MG/DL (ref 8.6–10.4)
CHLORIDE SERPL-SCNC: 103 MMOL/L (ref 98–107)
CO2 SERPL-SCNC: 25 MMOL/L (ref 20–31)
CREAT SERPL-MCNC: 1.2 MG/DL (ref 0.7–1.2)
GFR, ESTIMATED: 71 ML/MIN/1.73M2
GLUCOSE SERPL-MCNC: 135 MG/DL (ref 74–99)
POTASSIUM SERPL-SCNC: 3.8 MMOL/L (ref 3.7–5.3)
PROT SERPL-MCNC: 7.1 G/DL (ref 6.6–8.7)
SODIUM SERPL-SCNC: 141 MMOL/L (ref 136–145)

## 2024-12-16 ENCOUNTER — OFFICE VISIT (OUTPATIENT)
Dept: ORTHOPEDIC SURGERY | Age: 59
End: 2024-12-16
Payer: MEDICAID

## 2024-12-16 VITALS — WEIGHT: 245 LBS | RESPIRATION RATE: 16 BRPM | HEIGHT: 76 IN | BODY MASS INDEX: 29.83 KG/M2

## 2024-12-16 DIAGNOSIS — M25.561 BILATERAL CHRONIC KNEE PAIN: Primary | ICD-10-CM

## 2024-12-16 DIAGNOSIS — M17.0 ARTHRITIS OF BOTH KNEES: ICD-10-CM

## 2024-12-16 DIAGNOSIS — G89.29 BILATERAL CHRONIC KNEE PAIN: Primary | ICD-10-CM

## 2024-12-16 DIAGNOSIS — M25.562 BILATERAL CHRONIC KNEE PAIN: Primary | ICD-10-CM

## 2024-12-16 PROCEDURE — 99203 OFFICE O/P NEW LOW 30 MIN: CPT | Performed by: PHYSICIAN ASSISTANT

## 2024-12-16 PROCEDURE — 20610 DRAIN/INJ JOINT/BURSA W/O US: CPT | Performed by: PHYSICIAN ASSISTANT

## 2024-12-16 RX ORDER — BUPIVACAINE HYDROCHLORIDE 2.5 MG/ML
2 INJECTION, SOLUTION INFILTRATION; PERINEURAL ONCE
Status: COMPLETED | OUTPATIENT
Start: 2024-12-16 | End: 2024-12-16

## 2024-12-16 RX ORDER — BETAMETHASONE SODIUM PHOSPHATE AND BETAMETHASONE ACETATE 3; 3 MG/ML; MG/ML
12 INJECTION, SUSPENSION INTRA-ARTICULAR; INTRALESIONAL; INTRAMUSCULAR; SOFT TISSUE ONCE
Status: COMPLETED | OUTPATIENT
Start: 2024-12-16 | End: 2024-12-16

## 2024-12-16 RX ADMIN — BETAMETHASONE SODIUM PHOSPHATE AND BETAMETHASONE ACETATE 12 MG: 3; 3 INJECTION, SUSPENSION INTRA-ARTICULAR; INTRALESIONAL; INTRAMUSCULAR; SOFT TISSUE at 12:02

## 2024-12-16 RX ADMIN — BUPIVACAINE HYDROCHLORIDE 5 MG: 2.5 INJECTION, SOLUTION INFILTRATION; PERINEURAL at 12:06

## 2024-12-16 RX ADMIN — BUPIVACAINE HYDROCHLORIDE 5 MG: 2.5 INJECTION, SOLUTION INFILTRATION; PERINEURAL at 12:05

## 2024-12-16 ASSESSMENT — ENCOUNTER SYMPTOMS
COLOR CHANGE: 0
COUGH: 0
SHORTNESS OF BREATH: 0
VOMITING: 0

## 2024-12-16 NOTE — PROGRESS NOTES
bracing, injections, advanced imaging, physical therapy and/or surgical intervention.    The patient would like to proceed with:    1. Bilateral knee corticosteroid injection. The patient was given bilateral knee corticosteroid injections today in office. He tolerated the  procedure without complication. We discussed that the patient can have a repeat injection every 4 months if needed. Therefore she would be eligible on/after 4/16/2025 only if needed.     2. The patient was given a referral to formal outpatient physical therapy at [facility] outpatient PT. I would like the patient to work on lower extremity strengthening, range of motion restoration, balance and mobility and gait training.       The patient will follow up in 6 weeks, or sooner if needed. We discussed that the patient should call us with any questions or concerns.  The patient voiced his understanding.       Return in about 6 weeks (around 1/27/2025) for re-evaluation, after completing PT.    Total Time: 30 min, this does not include time spent on injections/procedures.      Orders Placed This Encounter   Medications    BUPivacaine (MARCAINE) 0.25 % injection 5 mg    BUPivacaine (MARCAINE) 0.25 % injection 5 mg    betamethasone acetate-betamethasone sodium phosphate (CELESTONE) injection 12 mg    betamethasone acetate-betamethasone sodium phosphate (CELESTONE) injection 12 mg       Orders Placed This Encounter   Procedures    20610 - DRAIN/INJECT LARGE JOINT/BURSA    [facility]     Referral Priority:   Routine     Referral Type:   Eval and Treat     Referral Reason:   Specialty Services Required     Requested Specialty:   Physical Therapy     Number of Visits Requested:   1         This note is created with the assistance of a speech recognition program.  While intending to generate a document that actually reflects the content of the visit, the document can still have some errors including those of syntax and sound a

## 2024-12-18 ENCOUNTER — TELEPHONE (OUTPATIENT)
Dept: INTERNAL MEDICINE CLINIC | Age: 59
End: 2024-12-18

## 2024-12-18 ENCOUNTER — APPOINTMENT (OUTPATIENT)
Dept: GENERAL RADIOLOGY | Age: 59
End: 2024-12-18
Attending: EMERGENCY MEDICINE
Payer: MEDICAID

## 2024-12-18 ENCOUNTER — HOSPITAL ENCOUNTER (EMERGENCY)
Age: 59
Discharge: HOME | End: 2024-12-18
Attending: EMERGENCY MEDICINE
Payer: MEDICAID

## 2024-12-18 VITALS
TEMPERATURE: 97.5 F | DIASTOLIC BLOOD PRESSURE: 90 MMHG | HEIGHT: 76 IN | RESPIRATION RATE: 17 BRPM | SYSTOLIC BLOOD PRESSURE: 147 MMHG | HEART RATE: 75 BPM | BODY MASS INDEX: 29.83 KG/M2 | WEIGHT: 245 LBS | OXYGEN SATURATION: 94 %

## 2024-12-18 DIAGNOSIS — R07.9 CHEST PAIN, UNSPECIFIED TYPE: Primary | ICD-10-CM

## 2024-12-18 LAB
ANION GAP SERPL CALCULATED.3IONS-SCNC: 13 MMOL/L (ref 9–16)
BASOPHILS # BLD: 0 K/UL (ref 0–0.2)
BASOPHILS NFR BLD: 0 % (ref 0–2)
BUN SERPL-MCNC: 24 MG/DL (ref 6–20)
CALCIUM SERPL-MCNC: 9.1 MG/DL (ref 8.6–10.4)
CHLORIDE SERPL-SCNC: 105 MMOL/L (ref 98–107)
CO2 SERPL-SCNC: 21 MMOL/L (ref 20–31)
CREAT SERPL-MCNC: 1.2 MG/DL (ref 0.7–1.2)
D DIMER PPP FEU-MCNC: <0.27 UG/ML FEU (ref 0–0.59)
EOSINOPHIL # BLD: 0 K/UL (ref 0–0.4)
EOSINOPHILS RELATIVE PERCENT: 0 % (ref 0–4)
ERYTHROCYTE [DISTWIDTH] IN BLOOD BY AUTOMATED COUNT: 13 % (ref 11.5–14.9)
GFR, ESTIMATED: 71 ML/MIN/1.73M2
GLUCOSE SERPL-MCNC: 177 MG/DL (ref 74–99)
HCT VFR BLD AUTO: 45.2 % (ref 41–53)
HGB BLD-MCNC: 15.6 G/DL (ref 13.5–17.5)
INR PPP: 1.1
LYMPHOCYTES NFR BLD: 1.8 K/UL (ref 1–4.8)
LYMPHOCYTES RELATIVE PERCENT: 15 % (ref 24–44)
MCH RBC QN AUTO: 32.2 PG (ref 26–34)
MCHC RBC AUTO-ENTMCNC: 34.5 G/DL (ref 31–37)
MCV RBC AUTO: 93.5 FL (ref 80–100)
MONOCYTES NFR BLD: 0.5 K/UL (ref 0.1–1.3)
MONOCYTES NFR BLD: 5 % (ref 1–7)
MYOGLOBIN SERPL-MCNC: 30 NG/ML (ref 28–72)
MYOGLOBIN SERPL-MCNC: 35 NG/ML (ref 28–72)
NEUTROPHILS NFR BLD: 80 % (ref 36–66)
NEUTS SEG NFR BLD: 9.6 K/UL (ref 1.3–9.1)
PARTIAL THROMBOPLASTIN TIME: 25.1 SEC (ref 24–36)
PLATELET # BLD AUTO: 276 K/UL (ref 150–450)
PMV BLD AUTO: 8.8 FL (ref 6–12)
POTASSIUM SERPL-SCNC: 3.5 MMOL/L (ref 3.7–5.3)
PROTHROMBIN TIME: 14.7 SEC (ref 11.8–14.6)
RBC # BLD AUTO: 4.83 M/UL (ref 4.5–5.9)
SODIUM SERPL-SCNC: 139 MMOL/L (ref 136–145)
T4 FREE SERPL-MCNC: 0.8 NG/DL (ref 0.9–1.7)
TROPONIN I SERPL HS-MCNC: 10 NG/L (ref 0–22)
TROPONIN I SERPL HS-MCNC: 9 NG/L (ref 0–22)
TSH SERPL DL<=0.05 MIU/L-ACNC: 1.67 UIU/ML (ref 0.27–4.2)
WBC OTHER # BLD: 12 K/UL (ref 3.5–11)

## 2024-12-18 PROCEDURE — 85730 THROMBOPLASTIN TIME PARTIAL: CPT

## 2024-12-18 PROCEDURE — 84443 ASSAY THYROID STIM HORMONE: CPT

## 2024-12-18 PROCEDURE — 80048 BASIC METABOLIC PNL TOTAL CA: CPT

## 2024-12-18 PROCEDURE — 71045 X-RAY EXAM CHEST 1 VIEW: CPT

## 2024-12-18 PROCEDURE — 85025 COMPLETE CBC W/AUTO DIFF WBC: CPT

## 2024-12-18 PROCEDURE — 93005 ELECTROCARDIOGRAM TRACING: CPT | Performed by: EMERGENCY MEDICINE

## 2024-12-18 PROCEDURE — 6370000000 HC RX 637 (ALT 250 FOR IP): Performed by: EMERGENCY MEDICINE

## 2024-12-18 PROCEDURE — 84439 ASSAY OF FREE THYROXINE: CPT

## 2024-12-18 PROCEDURE — 85610 PROTHROMBIN TIME: CPT

## 2024-12-18 PROCEDURE — 36415 COLL VENOUS BLD VENIPUNCTURE: CPT

## 2024-12-18 PROCEDURE — 84484 ASSAY OF TROPONIN QUANT: CPT

## 2024-12-18 PROCEDURE — 99285 EMERGENCY DEPT VISIT HI MDM: CPT

## 2024-12-18 PROCEDURE — 83874 ASSAY OF MYOGLOBIN: CPT

## 2024-12-18 PROCEDURE — 85379 FIBRIN DEGRADATION QUANT: CPT

## 2024-12-18 RX ORDER — ASPIRIN 81 MG/1
324 TABLET, CHEWABLE ORAL ONCE
Status: COMPLETED | OUTPATIENT
Start: 2024-12-18 | End: 2024-12-18

## 2024-12-18 RX ADMIN — ASPIRIN 324 MG: 81 TABLET, CHEWABLE ORAL at 12:55

## 2024-12-18 ASSESSMENT — ENCOUNTER SYMPTOMS
COUGH: 0
COLOR CHANGE: 0
SINUS PRESSURE: 0
SORE THROAT: 0
TROUBLE SWALLOWING: 0
CONSTIPATION: 0
SHORTNESS OF BREATH: 1
WHEEZING: 0
DIARRHEA: 0
EYE REDNESS: 0
FACIAL SWELLING: 0
EYE DISCHARGE: 0
NAUSEA: 0
BACK PAIN: 0
EYE PAIN: 0
RHINORRHEA: 0
CHEST TIGHTNESS: 0
VOMITING: 0
ABDOMINAL PAIN: 0
BLOOD IN STOOL: 0

## 2024-12-18 ASSESSMENT — PAIN DESCRIPTION - LOCATION: LOCATION: CHEST

## 2024-12-18 ASSESSMENT — PAIN DESCRIPTION - ORIENTATION: ORIENTATION: MID

## 2024-12-18 ASSESSMENT — HEART SCORE: ECG: NORMAL

## 2024-12-18 NOTE — ED PROVIDER NOTES
Psychiatric/Behavioral:  Negative for confusion, decreased concentration, hallucinations, self-injury, sleep disturbance and suicidal ideas.        PAST MEDICAL HISTORY     Past Medical History:   Diagnosis Date    Arthritis     left knee, and bilateral hands    Asthma     Back pain     Fibromyalgia 2015    H/O cardiac radiofrequency ablation January and March 2015    Headache(784.0)     PVC (premature ventricular contraction)     Restless legs syndrome 2015    Sleep apnea     no C-pap at this time.    SVT (supraventricular tachycardia) (HCC)     Vertigo        SURGICAL HISTORY       Past Surgical History:   Procedure Laterality Date    ABLATION OF DYSRHYTHMIC FOCUS  08/06/2016    pvc's /  DR BUENO    ANKLE SURGERY Right     mole removed    CARDIAC SURGERY  01/26/2015    SVT ablation x2    CARPAL TUNNEL RELEASE Right 08/2018    CHOLECYSTECTOMY      KNEE ARTHROSCOPY Left     KNEE SURGERY Left     scraped arthritis off    ROTATOR CUFF REPAIR Left     ROTATOR CUFF REPAIR Left     TONSILLECTOMY      adnoids    URETER SURGERY Left 9/28/2022    HOLMIUM, CYSTOSCOPY, URETEROSCOPY, STENT PLACEMENT performed by Jim Hightower MD at [facility] OR       CURRENT MEDICATIONS       Current Discharge Medication List        CONTINUE these medications which have NOT CHANGED    Details   amLODIPine (NORVASC) 5 MG tablet Take 1 tablet by mouth daily  Qty: 90 tablet, Refills: 1    Associated Diagnoses: Essential hypertension      atorvastatin (LIPITOR) 10 MG tablet Take 1 tablet by mouth daily  Qty: 90 tablet, Refills: 1    Associated Diagnoses: Mixed hyperlipidemia      diclofenac sodium (VOLTAREN) 1 % GEL Apply 4 g topically 4 times daily  Qty: 150 g, Refills: 1    Associated Diagnoses: Pain of both elbows; Chronic pain of both knees      rizatriptan (MAXALT) 10 MG tablet Take 1 tablet by mouth every 12 hours as needed for Migraine PLEASE SEE ATTACHED FOR DETAILED DIRECTIONS  Qty: 10 tablet, Refills: 0    Associated Diagnoses: Intractable

## 2024-12-18 NOTE — TELEPHONE ENCOUNTER
I advised patient to go to the ER for high blood pressure readings and headaches.  His blood pressure for the past few days has been averaging 159/103.  Patient is also short of breath.  Patient stated he was going to head to the ER

## 2024-12-19 ENCOUNTER — HOSPITAL ENCOUNTER (OUTPATIENT)
Dept: PHYSICAL THERAPY | Age: 59
Setting detail: THERAPIES SERIES
Discharge: HOME | End: 2024-12-19
Payer: MEDICAID

## 2024-12-19 LAB
EKG ATRIAL RATE: 70 BPM
EKG P AXIS: 51 DEGREES
EKG P-R INTERVAL: 176 MS
EKG Q-T INTERVAL: 412 MS
EKG QRS DURATION: 126 MS
EKG QTC CALCULATION (BAZETT): 444 MS
EKG R AXIS: 32 DEGREES
EKG T AXIS: 23 DEGREES
EKG VENTRICULAR RATE: 70 BPM

## 2024-12-19 PROCEDURE — 97162 PT EVAL MOD COMPLEX 30 MIN: CPT

## 2024-12-19 PROCEDURE — 97530 THERAPEUTIC ACTIVITIES: CPT

## 2024-12-19 PROCEDURE — 93010 ELECTROCARDIOGRAM REPORT: CPT | Performed by: INTERNAL MEDICINE

## 2024-12-19 NOTE — THERAPY EVALUATION
OBJECTIVE     OBSERVATION No Deficit Deficit Comments    Posture      Forward Head []  [x]  Moderately forward head    Rounded Shoulders [] [x]    Kyphosis [x] []inc.  [] dec.    Lordosis [x] []inc.  [] dec.    Lateral Shift [] [] (R) [] (L)    Slumped Sitting [] [x]    Palpation x     Sensation [x] [] Sometimes gets tingling into the hands with migraines    Edema [x] []    Neurological [x] []    Deep Tendon Reflexes [] [] NT    Ambulation   x Slow and guarded with SPC, wide PHANI    Balance/fall risk   x Impaired balance per mCTSIB    Vision    Awaiting on new glasses prescription    Orthostatic hypotension    NT      Cervical Assessment     Red flags & Special Tests:  Result/comments    Vertebral Artery Neg    Alar Ligament  Negative for laxity    Transverse Ligament Neg for laxity    Other         Oculomotor Tests- With Fixation (Open Vision):    Test Result-  Symptoms  Description/comments   Spontaneous Nystagmus Neg      Gaze Holding Nystagmus neg     Smooth Pursuit  neg     Ocular ROM Full   Feeling somewhat uneasy but feels it normal    Saccades Normal      Convergence Normal      VOR Slow Stable   \"Euphoric\"    VOR Cancellation Normal      Head Thrust/Head Impulse Test  Negative   To the R: feeling more off balanced dizziness    Static Visual Acuity [20ft] NT      Dynamic Visual Acuity [20ft]  NT   Read lowest line possible, backwards    Valsalva NT          Positional Tests:    TEST ROOM LIGHT WITHOUT FIXATION-IR GOGGLES   RIGHT CASANDRA-HALLPIKE   Negative    LEFT CASANDRA-HALLPIKE   Negative    SUPINE HEAD CENTER   Negative    ROLL TEST (L):   (L): negative     (R):   (R):  negative      Function Outcomes/Testing     Test   12/19/24      Modified CTSIB      - Condition #1: Narrow base of support, eyes open, firm 30s    - Condition #2: Narrow base of support, eyes closed, firm 7s with moderate sway, falling to the L     - Condition #3: Narrow base of support, eyes open, compliant  30s     - Condition #4: Narrow

## 2024-12-20 ENCOUNTER — TELEPHONE (OUTPATIENT)
Dept: INTERNAL MEDICINE CLINIC | Age: 59
End: 2024-12-20

## 2024-12-20 ENCOUNTER — HOSPITAL ENCOUNTER (OUTPATIENT)
Dept: CT IMAGING | Age: 59
Discharge: HOME | End: 2024-12-22
Payer: MEDICAID

## 2024-12-20 DIAGNOSIS — Z87.891 PERSONAL HISTORY OF TOBACCO USE: ICD-10-CM

## 2024-12-20 DIAGNOSIS — I10 ESSENTIAL HYPERTENSION: Primary | ICD-10-CM

## 2024-12-20 PROCEDURE — 71271 CT THORAX LUNG CANCER SCR C-: CPT

## 2024-12-30 ENCOUNTER — OFFICE VISIT (OUTPATIENT)
Dept: ORTHOPEDIC SURGERY | Age: 59
End: 2024-12-30
Payer: MEDICAID

## 2024-12-30 VITALS — HEIGHT: 76 IN | WEIGHT: 245 LBS | BODY MASS INDEX: 29.83 KG/M2 | RESPIRATION RATE: 14 BRPM

## 2024-12-30 DIAGNOSIS — S46.212A STRAIN OF LEFT BICEPS, INITIAL ENCOUNTER: ICD-10-CM

## 2024-12-30 DIAGNOSIS — S46.211A STRAIN OF RIGHT BICEPS, INITIAL ENCOUNTER: Primary | ICD-10-CM

## 2024-12-30 PROCEDURE — 99203 OFFICE O/P NEW LOW 30 MIN: CPT | Performed by: ORTHOPAEDIC SURGERY

## 2024-12-30 NOTE — PROGRESS NOTES
magnesium oxide (MAG-OX) 400 MG tablet Take 1 tablet by mouth daily 30 tablet 1    albuterol sulfate HFA (PROVENTIL;VENTOLIN;PROAIR) 108 (90 Base) MCG/ACT inhaler Inhale 2 puffs into the lungs every 4 hours as needed for Wheezing or Shortness of Breath 18 g 5    blood glucose monitor strips Test 1 time a day 100 strip 0    Lancets MISC Use once daily to check glucose. 100 each 0    carbamide peroxide (DEBROX) 6.5 % otic solution Place 5 drops into both ears twice daily for 5 days (Patient not taking: Reported on 12/11/2024) 15 mL 1    glucose monitoring kit 1 kit by Does not apply route daily 1 kit 0    tamsulosin (FLOMAX) 0.4 MG capsule Take 1 capsule by mouth daily 30 capsule 0    naproxen sodium (ANAPROX) 550 MG tablet Take 1 tablet by mouth 2 times daily as needed for Pain (Headache) 14 tablet 3    amitriptyline (ELAVIL) 25 MG tablet Take 1 tablet by mouth nightly (Patient not taking: Reported on 12/11/2024) 30 tablet 3    rOPINIRole (REQUIP) 0.5 MG tablet Take 2 tablets by mouth at bedtime       No current facility-administered medications for this visit.       Allergies  Allergies have been reviewed.  Julien is allergic to seasonal.    Social History  Julien  reports that he quit smoking about 15 years ago. His smoking use included cigarettes. He started smoking about 35 years ago. He has a 20 pack-year smoking history. He has never used smokeless tobacco. He reports that he does not currently use alcohol. He reports current drug use. Frequency: 4.00 times per week. Drug: Marijuana (Weed).    Family History  Julien's family history includes Bleeding Prob in his father; Clotting Disorder in his father; Unknown in his father.      Review of Systems   History obtained from the patient.   REVIEW OF SYSTEMS:   Constitution: negative for fever, chills, weight loss or malaise   Musculoskeletal: As noted in the HPI   Neurologic: As noted in the HPI    Physical Exam  Resp 14   Ht 1.931 m (6' 4.02\")   Wt 111.1

## 2024-12-31 ENCOUNTER — HOSPITAL ENCOUNTER (OUTPATIENT)
Dept: PHYSICAL THERAPY | Age: 59
Setting detail: THERAPIES SERIES
Discharge: HOME | End: 2024-12-31
Payer: MEDICAID

## 2024-12-31 PROCEDURE — 97110 THERAPEUTIC EXERCISES: CPT

## 2024-12-31 PROCEDURE — 97112 NEUROMUSCULAR REEDUCATION: CPT

## 2024-12-31 NOTE — FLOWSHEET NOTE
[facility]   Outpatient Rehabilitation & Therapy  [address] [facility] [address]  P: [phone]   F: [phone]    Physical Therapy Daily Treatment Note      Date:  2024  Patient Name:  Julien Batista    :  1965  MRN: [id number]  Physician: Rosa Elena Watt MD                                   Insurance: [employer]   Medical Diagnosis: H81.12 (ICD-10-CM) - Benign paroxysmal positional vertigo of left ear   Rehab Codes: R42 dizziness, R23.9 abnormal posture, R26.81 unsteadiness on feet   Onset date: 24- date of referral                     Next 's appt.: 3/19/25- Dr. Watt   Visit# / total visits:   Cancels/No Shows: 0/1    Subjective:  Arrives stating everything is \"floating\" as far as what he sees. He feels very off balance. Had injections in both his knees this week. Reports he has PT eval for knees next week. His migraine is gone. Migraines occurring 2-4x/week. Increased with weather changes. Reports compliance with HEP given at evaluation.   Pain:  [x] Yes  [] No Location: knees  Pain Rating: (0-10 scale) 5-6/10  Pain altered Tx:  [] No  [] Yes  Action:  Comments:    Objective:  Modalities:   Precautions: B knee pain, Migraines, low back pain   Exercises:  Exercise Reps/ Time Weight/ Level Completed  Today Comments   Gaze stabilzation program: week 1     GST far target H/V    GST close target H/V 1' each   x More dizzy with close target   More dizzy horizontal HM     Given as HEP to complete 3-5x/day    VOR cx with HM H/V  1' each  X  Increased postural sway AP                  Occipital stretch 3x 30\"   x    Supine cervical retraction 2x10 5\"   x    Scapular retraction with ER  2x10 green X      Total gym semi prone row  2x10 7# x    Total gym semi prone T (middle trapezius) , shoulder extension/lat  2x10 each 3# x    Total gym semi prone Y (lower trapezius)  2x10 3# x    Other:    Specific Instructions for next treatment:  - postural training   - gaze stability/vestibular

## 2025-01-03 ENCOUNTER — HOSPITAL ENCOUNTER (OUTPATIENT)
Dept: PHYSICAL THERAPY | Age: 60
Setting detail: THERAPIES SERIES
Discharge: HOME | End: 2025-01-03
Payer: MEDICAID

## 2025-01-03 PROCEDURE — 97110 THERAPEUTIC EXERCISES: CPT

## 2025-01-03 PROCEDURE — 97112 NEUROMUSCULAR REEDUCATION: CPT

## 2025-01-03 NOTE — FLOWSHEET NOTE
[facility]   Outpatient Rehabilitation & Therapy  [address] [facility] [address]  P: [phone]   F: [phone]    Physical Therapy Daily Treatment Note      Date:  1/3/2025  Patient Name:  Julien Batista    :  1965  MRN: [id number]  Physician: Rosa Elena Watt MD                                   Insurance: [employer] Medicaid   Medical Diagnosis: H81.12 (ICD-10-CM) - Benign paroxysmal positional vertigo of left ear   Rehab Codes: R42 dizziness, R23.9 abnormal posture, R26.81 unsteadiness on feet   Onset date: 24- date of referral                     Next 's appt.: 3/19/25- Dr. Watt   Visit# / total visits: 3/14  Cancels/No Shows: 0/    Subjective:  1/3 Patient arrived and states his BP was high this morning reading 177/113, states he took medication after. States he tried standing up and had to sit right back down due to dizziness. States he feels wobbly when standing. Denies falls since last session.   Pain:  [x] Yes  [] No Location: knees  Pain Rating: (0-10 scale) 5-6/10  Pain altered Tx:  [] No  [] Yes  Action:    Objective:  Modalities:   Precautions: B knee pain, Migraines, low back pain   Exercises:  Exercise Reps/ Time Weight/ Level Completed  Today Comments   Gaze stabilzation program: week 1     GST far target H/V    GST close target H/V 1' 2 each   x More dizzy with close target   More dizzy horizontal HM     Given as HEP to complete 3-5x/day    VOR cx with HM H/V  1' 2 each  X  Increased postural sway AP    Head habituation diagonal head turns  1' 2   ea   x Looking upward to right and upward to left  Added 1/3            Occipital stretch 3x 30\"       Supine cervical retraction 2x10 5\"       Scapular retraction with ER  2x10 green X      Total gym semi prone row  2x15 7# x 1/3 inc reps    Total gym semi prone T (middle trapezius) , shoulder extension/lat  2x15 each 3# x 1/3 inc reps    Total gym semi prone Y (lower trapezius)  2x15  3# x 1/3 inc reps    Other: /94 bpm 108

## 2025-01-06 ENCOUNTER — HOSPITAL ENCOUNTER (OUTPATIENT)
Dept: PHYSICAL THERAPY | Age: 60
Setting detail: THERAPIES SERIES
Discharge: HOME | End: 2025-01-06
Payer: MEDICAID

## 2025-01-06 PROCEDURE — 97110 THERAPEUTIC EXERCISES: CPT

## 2025-01-06 PROCEDURE — 97162 PT EVAL MOD COMPLEX 30 MIN: CPT

## 2025-01-06 NOTE — THERAPY EVALUATION
enter  18          Handrail:    [x]  Right to enter   [x] Left to enter    Stairs within the home   --          Handrails: []  Right to ascending  [] Left to ascending   Bathroom has a []  Tub only  [] Tub/shower combo   [x] Walk in shower    []  Grab bars           Washing machine is on []  Main level   [] Second level   [] Basement   Hobbies  -    Work status  Not working        Medications: [x] Refer to full medical record [] None [] Other:  Allergies:      [x] Refer to full medical record [] None [] Other:      Pain:   Pain present? Yes    Location B knee pain -- below the knee cap and more \"inside\"    Pain Rating currently 2/10 -- sitting    Pain at worse 5/10 walking, 8/10 with steps    Description of pain Constant dull pain, occasional sharp pain throughout the day    Altered Sensation None   **Notes restless legs at night **    What makes it worse Walking, stairs    What makes it better Biofreeze, sitting in recliner, rest    Symptom progression Consistent -- no relief with the injections        Prior Level of Function:    Functional Status Previous level of function Current level of function Comments   Sitting [x] Independent  [] Deficit [x] Independent  [] Deficit    Standing/walking [] Independent  [x] Deficit [] Independent  [x] Deficit  Uses cane as needed due to balance    Driving [x] Independent  [] Deficit [x] Independent  [] Deficit    Housekeeping/Meal Preparation [x] Independent  [] Deficit [] Independent  [x] Deficit Limited standing tolerance    Lifting/Carrying [] Independent  [x] Deficit [] Independent  [x] Deficit Limited by pain and balance    Bending/Reaching [] Independent  [x] Deficit [] Independent  [x] Deficit Limited by pain    Stair climbing [] Independent  [x] Deficit [] Independent  [x] Deficit Increases knee pain    Pivoting [x] Independent  [] Deficit [x] Independent  [] Deficit    Squatting [] Independent  [x] Deficit [] Independent  [x] Deficit Increases knee pain    Other []

## 2025-01-07 ENCOUNTER — APPOINTMENT (OUTPATIENT)
Dept: PHYSICAL THERAPY | Age: 60
End: 2025-01-07
Payer: MEDICAID

## 2025-01-08 ENCOUNTER — HOSPITAL ENCOUNTER (OUTPATIENT)
Dept: PHYSICAL THERAPY | Age: 60
Setting detail: THERAPIES SERIES
Discharge: HOME | End: 2025-01-08
Payer: MEDICAID

## 2025-01-08 PROCEDURE — 97110 THERAPEUTIC EXERCISES: CPT

## 2025-01-08 PROCEDURE — 97112 NEUROMUSCULAR REEDUCATION: CPT

## 2025-01-08 NOTE — FLOWSHEET NOTE
[facility]   Outpatient Rehabilitation & Therapy  [address] [facility] [address]  P: [phone]   F: [phone]    Physical Therapy Daily Treatment Note      Date:  2025  Patient Name:  Julien Batista    :  1965  MRN: [id number]  Physician: Rosa Elena Watt MD                                   Insurance: [facility] Medicaid   Medical Diagnosis: H81.12 (ICD-10-CM) - Benign paroxysmal positional vertigo of left ear   Rehab Codes: R42 dizziness, R23.9 abnormal posture, R26.81 unsteadiness on feet   Onset date: 24- date of referral                     Next 's appt.: 3/19/25- Dr. Watt   Visit# / total visits:   Cancels/No Shows: 0/1    Subjective:  Patient arrives stating the dizziness is the same.  Says his BP is still high. BP reading this am was 150/97. Reports no other symptoms overall. Waiting to see cardiologist, seems them first week of Feb. Arrives without dizziness. Reports compliance with gaze stabilization exercises 3-4x/day.   Pain:  [x] Yes  [] No Location: knees  Pain Rating: (0-10 scale) 5-6/10  Pain altered Tx:  [] No  [] Yes  Action:    Objective:  Modalities:   Precautions: B knee pain, Migraines, low back pain   Exercises:  Exercise Reps/ Time Weight/ Level Completed  Today Comments   Gaze stabilzation program: week 1     GST far target H/V    GST close target H/V 1' 2 each           VOR cx with HM H/V  1' 2 each  X  Increased postural sway AP    Head habituation diagonal head turns  1' 2   ea     x Looking upward to right and upward to left  1 set just HM  2nd set VOR cx with ball.    Head/eye coordination H/V  2' each  X  Seated; progressed to week 2 GST program. Handout given as HEP. Given as HEP to complete 3-5x/day    Occipital stretch 3x 20\"   x    Cervical retraction with scaption  2x15     x At wall    Supine cervical retraction 2x10 5\"       Scapular retraction with ER  2x10 green X     Horizontal abduction  2x10 green X     BOR   2x10 each 7# X      Total gym semi

## 2025-01-09 ENCOUNTER — PATIENT MESSAGE (OUTPATIENT)
Dept: INTERNAL MEDICINE CLINIC | Age: 60
End: 2025-01-09

## 2025-01-09 ENCOUNTER — HOSPITAL ENCOUNTER (OUTPATIENT)
Dept: PHYSICAL THERAPY | Age: 60
Setting detail: THERAPIES SERIES
Discharge: HOME | End: 2025-01-09
Payer: MEDICAID

## 2025-01-09 PROCEDURE — 97112 NEUROMUSCULAR REEDUCATION: CPT

## 2025-01-09 PROCEDURE — 97110 THERAPEUTIC EXERCISES: CPT

## 2025-01-09 NOTE — FLOWSHEET NOTE
reduce reliance on vision.   Pt will decrease score on DHI to </=  50 points showing improvement of dizziness that has moderate effect on their everyday life.   Pt will demonstrate improved postural awareness with only minimal cues to correct posture throughout session to prevent undue stress to spine.       Long Term Goals: (to be met in 14     Treatments)   Pt will be able to balance with eyes closed for > 30 seconds with no LOB on foam surface to enhance control from other balance systems and reduce reliance on vision.   2. Pt will report only 1-2 migraines per week reducing incidence that is leading to dysfunction with day to day.   3. Pt will decrease score on DHI to </=  30 points showing improvement of dizziness that has moderate effect on their everyday life.   4. Pt will report no dizziness with turning over in bed improving bed mobility tolerance.   5. Pt will be able to ambulate with head turns with no LOB to be able to go to the grocery store.     Pt will be independent with home program addressing strength, flexibility and balance to maximize gains made in therapy to improve overall functional capacity for mobility.    Pt will report no falls for x6 weeks demonstrating improved safety with mobility and implementation of fall prevention strategies.       Patient Goals: TO relieve dizziness and be more balanced       Pt. Education:  [x] Yes  [] No  [] Reviewed Prior HEP/Ed  Method of Education: [x] Verbal  [] Demo  [] Written  Comprehension of Education:  [] Verbalizes understanding.  [] Demonstrates understanding.  [] Needs review.  [] Demonstrates/verbalizes HEP/Ed previously given.     Plan: [x] Continue per plan of care.   [] Other:      Treatment Charges: Mins Units Time in/Out   []  Modalities      [x]  Ther Exercise 15 1    []  Manual Therapy      []  Ther Activities      []  Aquatics      [x]  Neuromuscular 26 2    [] Vasocompression      [] Gait Training      [] Dry needling        [] 1 or 2

## 2025-01-10 ENCOUNTER — HOSPITAL ENCOUNTER (OUTPATIENT)
Dept: PHYSICAL THERAPY | Age: 60
Setting detail: THERAPIES SERIES
Discharge: HOME | End: 2025-01-10
Payer: MEDICAID

## 2025-01-10 PROCEDURE — 97110 THERAPEUTIC EXERCISES: CPT

## 2025-01-10 NOTE — FLOWSHEET NOTE
[facility]   Outpatient Rehabilitation & Therapy  [address] [facility] [address]  P: [phone]   F: [phone]    Physical Therapy Daily Treatment Note      Date:  1/10/2025  Patient Name:  Julien Batista    :  1965  MRN: [id number]  Physician:      Nettie Alicea PA-C                          Insurance: [employer] ?? -  visits remain for , auth after 30th visit  (pay attention as has 2 plan of cares)   Medical Diagnosis:   M25.561, M25.562, G89.29 (ICD-10-CM) - Bilateral chronic knee pain   M17.0 (ICD-10-CM) - Arthritis of both knees   Rehab Codes: R25 pain , M25.60 stiffness, R53.1 weakness   Onset Date: Chronic (24- Date of referral)               Next 's appt: 24- Ortho   Visit# / total visits: 2/10   Cancels/No Shows: 0/0    Subjective:      Patient reported knees were feeling ok until last night. Noted he slipped on steps carrying groceries and landed on R knee. Noted popping in L knee when getting back up. Reported hot shower this morning helped pain a little. Reported some mild \"fogginess\" in terms of vertigo symptoms.      Pain:  [x] Yes  [] No Location: B knees   Pain Rating: (0-10 scale) 6/10 R knee, 3/10 L knee   Pain altered Tx:  [x] No  [] Yes  Action:  Comments:    Objective:  Modalities:   Precautions:  Exercises:  Exercise Reps/ Time Weight/ Level Completed  Today Comments   Supine Hamstring Stretch 3x30\" ea LE  x    Modified Benji/Quad Stretch  3x30\" ea LE  x Strap to provide quad stretch    Bridges 10x2  x    Clamshells 10x2 Red x           Heel Taps 10x 4\" x    Step Ups into 3\" SLS 15 x ea 8\" x    3-way hip 15 x ea Red  x                  Other:    Specific Instructions for next treatment:  - work on pain tolerable loading of B knees   - focus on mechanics   - stretching to improve flexibility   - vaso if needed for pain        Assessment: [x] Progressing toward goals. Fist session following eval. Initiated session with stretching to improve LE

## 2025-01-13 ENCOUNTER — HOSPITAL ENCOUNTER (OUTPATIENT)
Dept: PHYSICAL THERAPY | Age: 60
Setting detail: THERAPIES SERIES
Discharge: HOME | End: 2025-01-13
Payer: MEDICAID

## 2025-01-13 PROCEDURE — 97110 THERAPEUTIC EXERCISES: CPT

## 2025-01-13 PROCEDURE — 97112 NEUROMUSCULAR REEDUCATION: CPT

## 2025-01-13 NOTE — FLOWSHEET NOTE
[facility]   Outpatient Rehabilitation & Therapy  [address] [facility] [address]  P: [phone]   F: [phone]    Physical Therapy Daily Treatment Note    Date:  2025  Patient Name:  Julien Batista    :  1965  MRN: [id number]  Physician:      Nettie Alicea PA-C                          Insurance: [employer] ?? -  visits remain for , auth after 30th visit  (pay attention as has 2 plan of cares)   Medical Diagnosis:   M25.561, M25.562, G89.29 (ICD-10-CM) - Bilateral chronic knee pain   M17.0 (ICD-10-CM) - Arthritis of both knees   Rehab Codes: R25 pain , M25.60 stiffness, R53.1 weakness   Onset Date: Chronic (24- Date of referral)               Next 's appt: 24- Ortho   Visit# / total visits: 3/10   Cancels/No Shows: 0/0    Subjective:      Patient reports tripping when going up stairs yesterday hitting left knee and right elbow.  States right knee is more painful upon arrival today.  States he feels the injections did not help much at this point.      Pain:  [x] Yes  [] No Location: B knees   Pain Rating: (0-10 scale) 6/10 R knee, 4-5/10 L knee   Pain altered Tx:  [x] No  [] Yes  Action:  Comments:    Objective:  Modalities:   Precautions:  Exercises:  Exercise Reps/ Time Weight/ Level Completed  Today Comments   Supine Hamstring Stretch 3x30\" ea LE  x    Modified Benji/Quad Stretch  3x30\" ea LE  x Strap to provide quad stretch    Bridges 10x2  x    Clamshells 10x2 Red x           Heel Taps F 10x 4\" x    Step Ups into 3\" SLS 15 x ea x sets 8\" x    4-way hip 15 x ea Red  x    Min squats 15 x ea  x           Other:    Specific Instructions for next treatment:  - work on pain tolerable loading of B knees   - focus on mechanics   - stretching to improve flexibility   - vaso if needed for pain      Assessment: [x] Progressing toward goals.  Continued with stretching to improve LE mobility along with mat level hip strengthening to patient's tolerance.  Continued with

## 2025-01-13 NOTE — FLOWSHEET NOTE
with scaption  2x15     x At wall    Cervical retraction with horizontal ABD  2x15 Yellow  x At wall    Scapular retraction with ER  2x15 green x    Horizontal abduction  2x10 green x    BOR  - bent over row  2x10 each 8# x Inc weight 1/13    Total gym semi prone row  2x15 7# x 1/3 inc reps    Total gym semi prone T (middle trapezius) , shoulder extension/lat  2x15 each 4# x 1/3 inc reps   Progressed weight 1/13   Total gym semi prone Y (lower trapezius)  2x15  4# x 1/3 inc reps   Progressed weight 1/13   Other:     Specific Instructions for next treatment:  - postural training   - gaze stability/vestibular training     Assessment: [x] Progressing toward goals. First portion of treatment focused on postural strengthening exercises to reduce stress on surrounding structures. Progressed weight for exercises as noted above to promote strength gains. Pt noted increased fatigue following progressions but was tolerable. Second portion of treatment spent on balance and vestibular training exercises. Pt SBA for all activities and is able to self correct on // bars. Added horizontal and vertical VOR cancellation exercise with staggered stance-- pt had more difficulty with LLE behind. Pt tends to have more LOB during vertical head turns compared to horizontal. Pt reported \"swaying\" sensation during all balance activities, pt states that sensation did not increase at all during session.     [] No change.     [] Other:    [x] Patient would continue to benefit from skilled physical therapy services in order to address the above limitations to improve functional mobility and abolish dizziness for patient to complete ADLs/IADLs with least amount of compensation or restriction.   GOALS:   Short Term Goals: (to be met in 7-8   Treatments)   Pt will report 50% improvement in dizziness showing a lessening of symptoms impacting daily life    improved gait mechanics, trajectory   Pt will be able to balance with eyes closed for > 30

## 2025-01-15 ENCOUNTER — OFFICE VISIT (OUTPATIENT)
Dept: INTERNAL MEDICINE CLINIC | Age: 60
End: 2025-01-15
Payer: MEDICAID

## 2025-01-15 VITALS
HEIGHT: 76 IN | HEART RATE: 98 BPM | WEIGHT: 242 LBS | DIASTOLIC BLOOD PRESSURE: 100 MMHG | SYSTOLIC BLOOD PRESSURE: 142 MMHG | OXYGEN SATURATION: 96 % | BODY MASS INDEX: 29.47 KG/M2

## 2025-01-15 DIAGNOSIS — I10 ESSENTIAL HYPERTENSION: ICD-10-CM

## 2025-01-15 DIAGNOSIS — Z98.890 S/P ABLATION OPERATION FOR ARRHYTHMIA: ICD-10-CM

## 2025-01-15 DIAGNOSIS — Z86.79 S/P ABLATION OPERATION FOR ARRHYTHMIA: ICD-10-CM

## 2025-01-15 DIAGNOSIS — I50.32 CHRONIC DIASTOLIC HEART FAILURE (HCC): ICD-10-CM

## 2025-01-15 DIAGNOSIS — Z12.11 COLON CANCER SCREENING: ICD-10-CM

## 2025-01-15 DIAGNOSIS — M46.1 SACROILIITIS (HCC): ICD-10-CM

## 2025-01-15 DIAGNOSIS — G43.009 MIGRAINE WITHOUT AURA AND WITHOUT STATUS MIGRAINOSUS, NOT INTRACTABLE: Primary | ICD-10-CM

## 2025-01-15 PROCEDURE — 3077F SYST BP >= 140 MM HG: CPT | Performed by: INTERNAL MEDICINE

## 2025-01-15 PROCEDURE — 3080F DIAST BP >= 90 MM HG: CPT | Performed by: INTERNAL MEDICINE

## 2025-01-15 PROCEDURE — 99214 OFFICE O/P EST MOD 30 MIN: CPT | Performed by: INTERNAL MEDICINE

## 2025-01-15 RX ORDER — NAPROXEN SODIUM 550 MG/1
550 TABLET ORAL 2 TIMES DAILY PRN
Qty: 14 TABLET | Refills: 3 | Status: SHIPPED | OUTPATIENT
Start: 2025-01-15

## 2025-01-15 RX ORDER — GLUCOSAMINE HCL/CHONDROITIN SU 500-400 MG
CAPSULE ORAL
Qty: 100 STRIP | Refills: 0 | Status: SHIPPED | OUTPATIENT
Start: 2025-01-15

## 2025-01-15 RX ORDER — AMLODIPINE BESYLATE 10 MG/1
10 TABLET ORAL DAILY
Qty: 30 TABLET | Refills: 4 | Status: SHIPPED | OUTPATIENT
Start: 2025-01-15

## 2025-01-15 SDOH — ECONOMIC STABILITY: INCOME INSECURITY: IN THE LAST 12 MONTHS, WAS THERE A TIME WHEN YOU WERE NOT ABLE TO PAY THE MORTGAGE OR RENT ON TIME?: YES

## 2025-01-15 SDOH — ECONOMIC STABILITY: FOOD INSECURITY: WITHIN THE PAST 12 MONTHS, THE FOOD YOU BOUGHT JUST DIDN'T LAST AND YOU DIDN'T HAVE MONEY TO GET MORE.: NEVER TRUE

## 2025-01-15 SDOH — ECONOMIC STABILITY: FOOD INSECURITY: WITHIN THE PAST 12 MONTHS, YOU WORRIED THAT YOUR FOOD WOULD RUN OUT BEFORE YOU GOT MONEY TO BUY MORE.: NEVER TRUE

## 2025-01-15 SDOH — ECONOMIC STABILITY: TRANSPORTATION INSECURITY
IN THE PAST 12 MONTHS, HAS THE LACK OF TRANSPORTATION KEPT YOU FROM MEDICAL APPOINTMENTS OR FROM GETTING MEDICATIONS?: NO

## 2025-01-15 SDOH — ECONOMIC STABILITY: TRANSPORTATION INSECURITY
IN THE PAST 12 MONTHS, HAS LACK OF TRANSPORTATION KEPT YOU FROM MEETINGS, WORK, OR FROM GETTING THINGS NEEDED FOR DAILY LIVING?: NO

## 2025-01-15 ASSESSMENT — PATIENT HEALTH QUESTIONNAIRE - PHQ9
2. FEELING DOWN, DEPRESSED OR HOPELESS: MORE THAN HALF THE DAYS
SUM OF ALL RESPONSES TO PHQ QUESTIONS 1-9: 14
9. THOUGHTS THAT YOU WOULD BE BETTER OFF DEAD, OR OF HURTING YOURSELF: NOT AT ALL
1. LITTLE INTEREST OR PLEASURE IN DOING THINGS: NEARLY EVERY DAY
SUM OF ALL RESPONSES TO PHQ QUESTIONS 1-9: 14
SUM OF ALL RESPONSES TO PHQ9 QUESTIONS 1 & 2: 5
10. IF YOU CHECKED OFF ANY PROBLEMS, HOW DIFFICULT HAVE THESE PROBLEMS MADE IT FOR YOU TO DO YOUR WORK, TAKE CARE OF THINGS AT HOME, OR GET ALONG WITH OTHER PEOPLE: SOMEWHAT DIFFICULT
3. TROUBLE FALLING OR STAYING ASLEEP: NEARLY EVERY DAY
7. TROUBLE CONCENTRATING ON THINGS, SUCH AS READING THE NEWSPAPER OR WATCHING TELEVISION: SEVERAL DAYS
4. FEELING TIRED OR HAVING LITTLE ENERGY: NEARLY EVERY DAY
8. MOVING OR SPEAKING SO SLOWLY THAT OTHER PEOPLE COULD HAVE NOTICED. OR THE OPPOSITE, BEING SO FIGETY OR RESTLESS THAT YOU HAVE BEEN MOVING AROUND A LOT MORE THAN USUAL: SEVERAL DAYS
2. FEELING DOWN, DEPRESSED OR HOPELESS: MORE THAN HALF THE DAYS
SUM OF ALL RESPONSES TO PHQ QUESTIONS 1-9: 14
6. FEELING BAD ABOUT YOURSELF - OR THAT YOU ARE A FAILURE OR HAVE LET YOURSELF OR YOUR FAMILY DOWN: SEVERAL DAYS
10. IF YOU CHECKED OFF ANY PROBLEMS, HOW DIFFICULT HAVE THESE PROBLEMS MADE IT FOR YOU TO DO YOUR WORK, TAKE CARE OF THINGS AT HOME, OR GET ALONG WITH OTHER PEOPLE: SOMEWHAT DIFFICULT
5. POOR APPETITE OR OVEREATING: NOT AT ALL
7. TROUBLE CONCENTRATING ON THINGS, SUCH AS READING THE NEWSPAPER OR WATCHING TELEVISION: SEVERAL DAYS
3. TROUBLE FALLING OR STAYING ASLEEP: NEARLY EVERY DAY
8. MOVING OR SPEAKING SO SLOWLY THAT OTHER PEOPLE COULD HAVE NOTICED. OR THE OPPOSITE - BEING SO FIDGETY OR RESTLESS THAT YOU HAVE BEEN MOVING AROUND A LOT MORE THAN USUAL: SEVERAL DAYS
6. FEELING BAD ABOUT YOURSELF - OR THAT YOU ARE A FAILURE OR HAVE LET YOURSELF OR YOUR FAMILY DOWN: SEVERAL DAYS
1. LITTLE INTEREST OR PLEASURE IN DOING THINGS: NEARLY EVERY DAY
9. THOUGHTS THAT YOU WOULD BE BETTER OFF DEAD, OR OF HURTING YOURSELF: NOT AT ALL
4. FEELING TIRED OR HAVING LITTLE ENERGY: NEARLY EVERY DAY
5. POOR APPETITE OR OVEREATING: NOT AT ALL

## 2025-01-15 NOTE — PROGRESS NOTES
[facility]  [place] [id number] [facility] [address]  Dept: [phone]  Dept Fax: [phone]     Name: Julien Batista  : 1965           Chief Complaint   Patient presents with    Hypertension     His bp has been high lately    Elbow Pain     Pt won't work with him as he already is getting PT on 2 other parts of his body.    Tinnitus     Constant ringing.        History of Present Illness:    HPI  Here for follow up   Last month   BP have been running high   Added amlodipine 5 recently   Will increase to 10 mg daily     Past Medical History:    Past Medical History:   Diagnosis Date    Arthritis     left knee, and bilateral hands    Asthma     Back pain     Fibromyalgia     H/O cardiac radiofrequency ablation January and 2015    Headache(784.0)     PVC (premature ventricular contraction)     Restless legs syndrome     Sleep apnea     no C-pap at this time.    SVT (supraventricular tachycardia) (HCC)     Vertigo       Reviewed all health maintenance requirements and ordered appropriate tests  Health Maintenance Due   Topic Date Due    Hepatitis B vaccine (1 of 3 - 19+ 3-dose series) Never done    Colorectal Cancer Screen  Never done    Shingles vaccine (1 of 2) Never done    Flu vaccine (1) 2024    COVID-19 Vaccine ( - - season) Never done       Past Surgical History:    Past Surgical History:   Procedure Laterality Date    ABLATION OF DYSRHYTHMIC FOCUS  2016    pvc's /  DR BUENO    ANKLE SURGERY Right     mole removed    CARDIAC SURGERY  2015    SVT ablation x2    CARPAL TUNNEL RELEASE Right 2018    CHOLECYSTECTOMY      KNEE ARTHROSCOPY Left     KNEE SURGERY Left     scraped arthritis off    ROTATOR CUFF REPAIR Left     ROTATOR CUFF REPAIR Left     TONSILLECTOMY      adnoids    URETER SURGERY Left 2022    HOLMIUM, CYSTOSCOPY, URETEROSCOPY, STENT PLACEMENT performed by Jim Hightower MD at [facility] OR

## 2025-01-15 NOTE — PROGRESS NOTES
\"Have you been to the ER, urgent care clinic since your last visit?  Hospitalized since your last visit?\"    YES    “Have you seen or consulted any other health care providers outside our system since your last visit?”    YES      “Have you had a colorectal cancer screening such as a colonoscopy/FIT/Cologuard?    NO    No colonoscopy on file  No cologuard on file  No FIT/FOBT on file   No flexible sigmoidoscopy on file

## 2025-01-16 ENCOUNTER — HOSPITAL ENCOUNTER (OUTPATIENT)
Dept: PHYSICAL THERAPY | Age: 60
Setting detail: THERAPIES SERIES
Discharge: HOME | End: 2025-01-16
Payer: MEDICAID

## 2025-01-16 PROCEDURE — 97110 THERAPEUTIC EXERCISES: CPT

## 2025-01-16 PROCEDURE — 97112 NEUROMUSCULAR REEDUCATION: CPT

## 2025-01-16 NOTE — FLOWSHEET NOTE
[facility]   Outpatient Rehabilitation & Therapy  [address]  P: [phone]   F: [phone]    Physical Therapy Daily Treatment Note      Date:  2025  Patient Name:  Julien Batista    :  1965  MRN: [id number]  Physician: Rosa Elena Watt MD                                   Insurance: [employer] -- visits remain   Medical Diagnosis: H81.12 (ICD-10-CM) - Benign paroxysmal positional vertigo of left ear   Rehab Codes: R42 dizziness, R23.9 abnormal posture, R26.81 unsteadiness on feet   Onset date: 24- date of referral                     Next Dr's appt.: 3/19/25- Dr. Watt   Visit# / total visits:   Cancels/No Shows: 0/1    Subjective:    Patient arrives with straight cane this date as his knees are more painful from going up/down steps yesterday. Says his dizziness has not improved since starting PT. Says its not getting worse, but no improvement.     Pain:  [x] Yes  [] No Location: Efe knees  Pain Rating: (0-10 scale) 6/10 left, 4/10 right   Pain altered Tx:  [x] No  [] Yes  Action:    Objective:  Modalities:   Precautions: B knee pain, Migraines, low back pain   Exercises:  Exercise Reps/ Time Weight/ Level Completed  Today Comments    Gaze stabilzation program: week 1     GST far target H/V    GST close target H/V 1' 2 each    eyes remain on target while moving head       VOR cx with HM H/V & diagonals  1' 2 each   Increased postural sway AP -- V = 2 LOB backwards // H = 1 LOB posterior        VOR with staggered stance: H & V 30\"x 2 ea  cone x Added   Mod postural sway    Congregation pews/ f/b weight shift against wall  20x   Trying to get limits of stability    Cone pick ups  X 8 each way  X     Card organization  20 cards    Moving from low on to high with organization by suit    Rocker board taps  F/ B  2x30   x    Rocker board holds  F/B 2x30s   x    Head/eye coordination H/V  2' each   Seated; progressed to week 2 GST program. Handout given as HEP.

## 2025-01-16 NOTE — FLOWSHEET NOTE
[facility]   Outpatient Rehabilitation & Therapy  [address] [facility] [address]  P: [phone]   F: [phone]    Physical Therapy Daily Treatment Note    Date:  2025  Patient Name:  Julien Batista    :  1965  MRN: [id number]  Physician:      Nettie Alicea PA-C                          Insurance: [employer] ?? -  visits remain for , auth after 30th visit  (pay attention as has 2 plan of cares)   Medical Diagnosis:   M25.561, M25.562, G89.29 (ICD-10-CM) - Bilateral chronic knee pain   M17.0 (ICD-10-CM) - Arthritis of both knees   Rehab Codes: R25 pain , M25.60 stiffness, R53.1 weakness   Onset Date: Chronic (24- Date of referral)               Next 's appt: 24- Ortho   Visit# / total visits: 4/10   Cancels/No Shows: 0/0    Subjective:      Patient reports increased pain in left knee after last visit due to heel taps.  Wishes to not perform heel taps today.  Notes pain relatively unchanged since last visit.  Denies any dizziness to begin session today.      Pain:  [x] Yes  [] No Location: B knees   Pain Rating: (0-10 scale) 5/10 R knee, 5/10 L knee   Pain altered Tx:  [x] No  [] Yes  Action:  Comments:    Objective:  Modalities:   Precautions:  Exercises:  Exercise Reps/ Time Weight/ Level Completed  Today Comments   Supine Hamstring Stretch 3x30\" ea LE  x    Modified Benji/Quad Stretch  3x30\" ea LE  x Strap to provide quad stretch    Bridges 10x2 Red x Added tband   Clamshells 10x2 Red x           Heel Taps F 10x 4\"     Step Ups into 3\" SLS 15 x ea x sets 8\" x    4-way hip 15 x ea Red  x    Mini squats 15 x ea      TG squats 15x  L10 x    Other:    Specific Instructions for next treatment:  - work on pain tolerable loading of B knees   - focus on mechanics   - stretching to improve flexibility   - vaso if needed for pain      Assessment: [x] Progressing toward goals.  Continued with mat level stretches and strengthening on gluts, hips and LEs.  Added Tband to

## 2025-01-20 ENCOUNTER — HOSPITAL ENCOUNTER (OUTPATIENT)
Dept: PHYSICAL THERAPY | Age: 60
Setting detail: THERAPIES SERIES
Discharge: HOME | End: 2025-01-20
Payer: MEDICAID

## 2025-01-20 PROCEDURE — 97110 THERAPEUTIC EXERCISES: CPT

## 2025-01-20 PROCEDURE — 97112 NEUROMUSCULAR REEDUCATION: CPT

## 2025-01-20 NOTE — FLOWSHEET NOTE
[facility]   Outpatient Rehabilitation & Therapy  [address]  P: [phone]   F: [phone]    Physical Therapy Daily Treatment Note      Date:  2025  Patient Name:  Julien Batista    :  1965  MRN: [id number]  Physician: Rosa Elena Watt MD                                   Insurance: [facility] Medicaid -- visits remain   Medical Diagnosis: H81.12 (ICD-10-CM) - Benign paroxysmal positional vertigo of left ear   Rehab Codes: R42 dizziness, R23.9 abnormal posture, R26.81 unsteadiness on feet   Onset date: 24- date of referral                     Next Dr's appt.: 3/19/25- Dr. Watt   Visit# / total visits:   Cancels/No Shows: 0/1    Subjective:    Patient notes he still lacks the equilibrium affecting stability. He notes no spinning dizziness the last couple of days. Denies recent migraines in the last week. Notes daily headaches but feels fall last night might have made it worse. Denies any current headache.     Pain:  [x] Yes  [] No Location: Efe knees  Pain Rating: (0-10 scale) 8/10 R knee, 5/10 L knee   Pain altered Tx:  [x] No  [] Yes  Action:    Objective:  Modalities:   Precautions: B knee pain, Migraines, low back pain   Exercises:  Exercise Reps/ Time Weight/ Level Completed  Today Comments    Gaze stabilzation program: week 1     GST far target H/V    GST close target H/V 1' 2 each    eyes remain on target while moving head       VOR cx with HM H/V & diagonals  1' each Foam  x CGA to Margarette for balance        VORx1 with staggered stance: H & V 30\"x 2 ea  cone x Added   Mod postural sway    [employer] pews/ f/b weight shift against wall  20x   Trying to get limits of stability    Cone pick ups  X 8 each way  Held due to knee pain     Ball toss/ ball bounce/ altn toss & bounce -- rhomberg  20x ea   x  LOB posterior with bouncing -- mild flare up of symptoms post    Rocker board taps  F/ B  2x30   x    Rocker board holds  F/B 2x30s   x    Tandem stance  2x30s ea

## 2025-01-20 NOTE — FLOWSHEET NOTE
[facility]   Outpatient Rehabilitation & Therapy  [address]  P: [phone]   F: [phone]    Physical Therapy Daily Treatment Note    Date:  2025  Patient Name:  Julien Batista    :  1965  MRN: [id number]  Physician:      Nettie Alicea PA-C                          Insurance: [employer] ?? -  visits remain for , auth after 30th visit  (pay attention as has 2 plan of cares)   Medical Diagnosis:   M25.561, M25.562, G89.29 (ICD-10-CM) - Bilateral chronic knee pain   M17.0 (ICD-10-CM) - Arthritis of both knees   Rehab Codes: R25 pain , M25.60 stiffness, R53.1 weakness   Onset Date: Chronic (24- Date of referral)               Next 's appt: 24- Ortho   Visit# / total visits: 5/10   Cancels/No Shows: 0/0    PRECAUTIONS: Dizziness with position changes    ** has a concurrent vestibular plan of care**     Subjective:    Patient arrived late for appt as he thought appt was later.  Reported slipped on ice getting out of truck causing R knee to over flex and hit back on door frame of truck.  Fall has caused increased R knee pain to 8/10 and was using heating pad on low back this AM for comfort.    Pain:  [x] Yes  [] No Location: B knees   Pain Rating: (0-10 scale) 8/10 R knee, 5/10 L knee   Pain altered Tx:  [x] No  [] Yes  Action:  Comments:    Objective:    Exercises:  Exercise Reps/ Time Weight/ Level Completed  Today Comments   Supine Hamstring Stretch 3x30\" ea LE  x    Modified Benji/Quad Stretch  3x30\" ea LE  x Strap to provide quad stretch    Bridges 10x2 Red x Added tband   Clamshells 10x2 Red x           Heel Taps F 10x 4\"     Step Ups into 3\" SLS Fwd & lat  15 x ea x sets 8\" x Mild  L knee buckling in stance    4-way hip 15 x ea green x    Mini squats 15 x ea      TG squats- neutral & staggered  15x  ea  L10 x    Other:      Specific Instructions for next treatment:  - work on pain tolerable loading of B knees   - focus on mechanics   -

## 2025-01-21 ENCOUNTER — PREP FOR PROCEDURE (OUTPATIENT)
Dept: GASTROENTEROLOGY | Age: 60
End: 2025-01-21

## 2025-01-21 ENCOUNTER — TELEPHONE (OUTPATIENT)
Dept: GASTROENTEROLOGY | Age: 60
End: 2025-01-21

## 2025-01-21 DIAGNOSIS — Z12.11 COLON CANCER SCREENING: Primary | ICD-10-CM

## 2025-01-21 DIAGNOSIS — Z12.11 COLON CANCER SCREENING: ICD-10-CM

## 2025-01-21 RX ORDER — POLYETHYLENE GLYCOL 3350, SODIUM SULFATE ANHYDROUS, SODIUM BICARBONATE, SODIUM CHLORIDE, POTASSIUM CHLORIDE 236; 22.74; 6.74; 5.86; 2.97 G/4L; G/4L; G/4L; G/4L; G/4L
4 POWDER, FOR SOLUTION ORAL ONCE
Qty: 4000 ML | Refills: 0 | Status: SHIPPED | OUTPATIENT
Start: 2025-01-21 | End: 2025-01-21

## 2025-01-21 NOTE — TELEPHONE ENCOUNTER
Attempt 1, writer spoke with pt to schedule colonoscopy. Pt states he does not take blood thinners. Pt would like procedure in the PM and in mid-March.    Procedure scheduled/Dr Brooks  Procedure: Colonoscopy (WQ/Screening)  Dx: Colon cancer screening  Date: Tuesday 03/18/25  Time: 1:30 pm/Arrive at 11:30 am  Hospital: [facility]; Surgery Entrance, back of hospital  PAT Phone Call: Friday 03/07/25 at 9:30 am  Bowel Prep instructions given: Corina Split-Bowel Prep  In office/via phone: via phone  Clearance needed: N/A    Pt informed they will receive a PAT Phone Call from a [facility] PAT Nurse 1-2 weeks prior to procedure. Pt informed they must complete PAT Call or procedure may be cancelled.     Pt informed it is required they must have a /responsible adult that takes them to their procedure, stays at the hospital (before, during, and after procedure), and drives pt home. Pt informed /responsible adult must stay inside the hospital during their procedure. Pt voiced understanding of  protocol for procedure.

## 2025-01-23 ENCOUNTER — HOSPITAL ENCOUNTER (OUTPATIENT)
Dept: PHYSICAL THERAPY | Age: 60
Setting detail: THERAPIES SERIES
Discharge: HOME | End: 2025-01-23
Payer: MEDICAID

## 2025-01-23 PROCEDURE — 97110 THERAPEUTIC EXERCISES: CPT

## 2025-01-23 PROCEDURE — 97112 NEUROMUSCULAR REEDUCATION: CPT

## 2025-01-23 NOTE — FLOWSHEET NOTE
[facility]   Outpatient Rehabilitation & Therapy  [address]  P: [phone]   F: [phone]    Physical Therapy Daily Treatment Note      Date:  2025  Patient Name:  Julien Batista    :  1965  MRN: [id number]  Physician: Rosa Elena Watt MD                                   Insurance: [employer] -- visits remain   Medical Diagnosis: H81.12 (ICD-10-CM) - Benign paroxysmal positional vertigo of left ear   Rehab Codes: R42 dizziness, R23.9 abnormal posture, R26.81 unsteadiness on feet   Onset date: 24- date of referral                     Next Dr's appt.: 3/19/25- Dr. Watt   Visit# / total visits:   Cancels/No Shows: 0/1    Subjective:    Patient notes he is doing ok but still has aches/pains from the fall .     Pain:  [x] Yes  [] No Location: low back right side  / knees Pain Rating: (0-10 scale)  5/10 low back , 8/10 R knee, 5/10 L knee   Pain altered Tx:  [x] No  [] Yes  Action:    Objective:  Modalities:   Precautions: B knee pain, Migraines, low back pain   Exercises:  Exercise Reps/ Time Weight/ Level Completed  Today Comments    Gaze stabilzation program: week 1     GST far target H/V    GST close target H/V 1' 2 each    eyes remain on target while moving head       VOR cx with HM H/V & diagonals  1' each Foam  x CGA to Margarette for balance        VORx1 with staggered stance: H & V 30\"x 2 ea  cone x Added   Mod postural sway    [employer] pews/ f/b weight shift against wall  20x   Trying to get limits of stability    Cone pick ups  X 8 each way  Held due to knee pain     Ball pass behind back X10 ea way  x  More symptomatic following pass to the right  vs the left.    Ball toss/ ball bounce/ altn toss & bounce -- rhomberg  20x ea     LOB posterior with bouncing -- mild flare up of symptoms post    Rocker board taps  F/ B  X 20 each  x    Rocker board holds  F/B 2x30s   x LOB only 1x in 2nd set    Tandem stance  2x30s ea       Step over yaneth and

## 2025-01-23 NOTE — FLOWSHEET NOTE
[facility]   Outpatient Rehabilitation & Therapy  [address]  P: [phone]   F: [phone]    Physical Therapy Daily Treatment Note    Date:  2025  Patient Name:  Julien Batista    :  1965  MRN: [id number]  Physician:      Nettie Alicea PA-C                          Insurance: [employer] ?? -  visits remain for , auth after 30th visit  (pay attention as has 2 plan of cares)   Medical Diagnosis:   M25.561, M25.562, G89.29 (ICD-10-CM) - Bilateral chronic knee pain   M17.0 (ICD-10-CM) - Arthritis of both knees   Rehab Codes: R25 pain , M25.60 stiffness, R53.1 weakness   Onset Date: Chronic (24- Date of referral)               Next 's appt: 24- Ortho   Visit# / total visits: 6/10   Cancels/No Shows: 0/0    PRECAUTIONS: Dizziness with position changes    ** has a concurrent vestibular plan of care**     Subjective:      Patient reports that R knee is still a little more painful following recent fall, but is improving. No complaints otherwise.      Pain:  [x] Yes  [] No Location: B knees   Pain Rating: (0-10 scale) 5/10 R knee, 4/10 L knee   Pain altered Tx:  [x] No  [] Yes  Action:  Comments:    Objective:    Exercises:  Exercise Reps/ Time Weight/ Level Completed  Today Comments   Supine Hamstring Stretch 3x30\" ea LE  x    Modified Benji/Quad Stretch  3x30\" ea LE  x Strap to provide quad stretch    Bridges 10x2 Red x Added tband   Clamshells 10x2 Red x           Heel Taps F 10x 4\"     Step Ups into 3\" SLS Fwd & lat  15 x ea x sets 8\" x Mild  L knee buckling in stance    4-way hip 15 x ea green x    Mini squats 15 x ea  x    TG squats- neutral & staggered  15x  ea  L10     Other:      Specific Instructions for next treatment:  - work on pain tolerable loading of B knees   - focus on mechanics   - stretching to improve flexibility   - vaso if needed for pain      Assessment: [x] Progressing toward goals. Continued focus on BLE strength per patient

## 2025-01-24 DIAGNOSIS — M25.561 CHRONIC PAIN OF BOTH KNEES: ICD-10-CM

## 2025-01-24 DIAGNOSIS — G89.29 CHRONIC PAIN OF BOTH KNEES: ICD-10-CM

## 2025-01-24 DIAGNOSIS — M25.562 CHRONIC PAIN OF BOTH KNEES: ICD-10-CM

## 2025-01-24 DIAGNOSIS — M25.521 PAIN OF BOTH ELBOWS: ICD-10-CM

## 2025-01-24 DIAGNOSIS — M25.522 PAIN OF BOTH ELBOWS: ICD-10-CM

## 2025-01-27 ENCOUNTER — OFFICE VISIT (OUTPATIENT)
Dept: ORTHOPEDIC SURGERY | Age: 60
End: 2025-01-27
Payer: MEDICAID

## 2025-01-27 ENCOUNTER — HOSPITAL ENCOUNTER (OUTPATIENT)
Dept: PHYSICAL THERAPY | Age: 60
Setting detail: THERAPIES SERIES
Discharge: HOME | End: 2025-01-27
Payer: MEDICAID

## 2025-01-27 VITALS — RESPIRATION RATE: 14 BRPM | WEIGHT: 243.2 LBS | HEIGHT: 76 IN | BODY MASS INDEX: 29.61 KG/M2

## 2025-01-27 DIAGNOSIS — M17.0 ARTHRITIS OF BOTH KNEES: ICD-10-CM

## 2025-01-27 DIAGNOSIS — M25.561 RIGHT KNEE PAIN, UNSPECIFIED CHRONICITY: Primary | ICD-10-CM

## 2025-01-27 DIAGNOSIS — M25.562 BILATERAL CHRONIC KNEE PAIN: ICD-10-CM

## 2025-01-27 DIAGNOSIS — M25.561 BILATERAL CHRONIC KNEE PAIN: ICD-10-CM

## 2025-01-27 DIAGNOSIS — G89.29 BILATERAL CHRONIC KNEE PAIN: ICD-10-CM

## 2025-01-27 PROCEDURE — 97112 NEUROMUSCULAR REEDUCATION: CPT

## 2025-01-27 PROCEDURE — 99214 OFFICE O/P EST MOD 30 MIN: CPT | Performed by: PHYSICIAN ASSISTANT

## 2025-01-27 PROCEDURE — 97110 THERAPEUTIC EXERCISES: CPT

## 2025-01-27 RX ORDER — METHYLPREDNISOLONE 4 MG/1
TABLET ORAL
Qty: 1 KIT | Refills: 0 | Status: SHIPPED | OUTPATIENT
Start: 2025-01-27 | End: 2025-02-02

## 2025-01-27 ASSESSMENT — ENCOUNTER SYMPTOMS
COLOR CHANGE: 0
COUGH: 0
SHORTNESS OF BREATH: 0
VOMITING: 0

## 2025-01-27 NOTE — PROGRESS NOTES
[facility], [facility] ORTHOPEDICS  [address] [place] [address]  Dept: [phone]  Dept Fax: [phone]        Ambulatory Follow Up      Subjective:   Julien Batista is a 59 y.o. year old male who presents to our office today for routine followup regarding his   1. Right knee pain, unspecified chronicity    2. Bilateral chronic knee pain    3. Arthritis of both knees        Chief Complaint   Patient presents with    Knee Pain     FU: B/L knee pain, R>L celestone injection on 12/16/24       History of Present Illness  The patient is a 59-year-old male here today in follow-up for chronic bilateral knee pain, with the right knee being worse than the left. He was last evaluated on 12/16/2024 and underwent bilateral knee corticosteroid injections. The patient did have a recent fall from standing height which caused him to bend his knee in a manner where his heel was touching his right buttock. The patient has had increased right knee pain since. The patient notes that the injections did not give him improvement in his discomfort from the beginning.    He experienced a fall on 01/19/2025, resulting in an unusual bending of his knee to the extent that his heel touched his right buttock. Since this incident, he has been experiencing persistent pain, although the throbbing sensation has subsided. The pain intensifies during ambulation and upon twisting the knee. He reports that the right knee is more painful than the left. Despite the pain, he retains the ability to fully extend and maintain the position of his knee. He has not undergone any previous MRIs for his knees. He also reports a cracking sound in his knees upon standing, which is accompanied by pain if the sound is loud.     He has undergone 6 to 8 sessions of physical therapy, with 2 sessions scheduled per week. He is currently on a regimen of naproxen and rizatriptan for migraine

## 2025-01-27 NOTE — PROGRESS NOTES
[facility]   Outpatient Rehabilitation & Therapy  [address] [facility] [address]  P: [phone]   F: [phone]    Physical Therapy Daily Treatment Note/Progress Note     Date:  2025  Patient Name:  Julien Batista    :  1965  MRN: [id number]  Physician:      Nettie Alicea PA-C                          Insurance: [employer] ?? -  visits remain for , auth after 30th visit  (pay attention as has 2 plan of cares)   Medical Diagnosis:   M25.561, M25.562, G89.29 (ICD-10-CM) - Bilateral chronic knee pain   M17.0 (ICD-10-CM) - Arthritis of both knees   Rehab Codes: R25 pain , M25.60 stiffness, R53.1 weakness   Onset Date: Chronic (24- Date of referral)               Next 's appt: 24- Ortho   Visit# / total visits: 7/10   Cancels/No Shows: 0/0  Date of initial visit: 25        Date of PN: 25 (visit 7)  Formal progress note reporting period:  25 - 25     PRECAUTIONS: Dizziness with position changes    ** has a concurrent vestibular plan of care**     Subjective:      Patient reports he saw orthopedic today. He has orders for MRI. He notes the R knee is still hurting from the fall last week. Most pain still located on lateral aspect -- 6-7/10. Notes L knee not in as much pain -- 4/10 -- and more crepitus.   Feels pain is not improving and strength still decreased.   Notes he is not able to get on his knees on the floor or get up from a low surface due to pain.     Pain:  [x] Yes  [] No Location: B knees   Pain Rating: (0-10 scale) 6/10 R knee, 4/10 L knee   Pain altered Tx:  [x] No  [] Yes  Action:  Comments:    Objective:    Exercises:  Exercise Reps/ Time Weight/ Level Completed  Today Comments   Supine Hamstring Stretch 2x30\" ea LE  x    Modified Benji/Quad Stretch  2x30\" ea LE  x Strap to provide quad stretch    Bridges 20x green x Added tband   Clamshells 20x green x           Heel Taps F 10x 4\"     Step Ups into 3\" SLS Fwd & lat  15 x ea x sets 8\" x

## 2025-01-27 NOTE — PROGRESS NOTES
[facility]   Outpatient Rehabilitation & Therapy  [address]  P: [phone]   F: [phone]    Physical Therapy Daily Treatment Note/Progress Note       Date:  2025  Patient Name:  Julien Batista    :  1965  MRN: [id number]  Physician: Rosa Elena Watt MD                                   Insurance: [facility] Medicaid -- visits remain   Medical Diagnosis: H81.12 (ICD-10-CM) - Benign paroxysmal positional vertigo of left ear   Rehab Codes: R42 dizziness, R23.9 abnormal posture, R26.81 unsteadiness on feet   Onset date: 24- date of referral                     Next Dr's appt.: 3/19/25- Dr. Watt   Visit# / total visits: 10/14  Cancels/No Shows: 0  Date of initial visit: 24        Date of PN: 25 (visit 10)  Formal progress note reporting period:  24 - 25     Subjective:    Patient notes  there has been no change in the dizziness. He notes most dizziness with moving quickly, arising out of bed, or bending over. He notes it is mostly light headed feeling. He notes last night he laid back and turned his head quickly to the L and had spinning dizziness where he had to \"hold on to the ground\".  Notes he has been dealing with high blood pressure running 160/90s. Only notes 1 fall when slipping on ice. Feels he stumbles but catches himself. Feels balance has not improved. He feels headache are reduced in intensity, but still daily. Notes migraines are only about 1x/ week.  To see ENT tomorrow.     Pain:  [x] Yes  [] No Location: low back right side  / knees Pain Rating: (0-10 scale)  5/10 low back , 8/10 R knee, 5/10 L knee   Pain altered Tx:  [x] No  [] Yes  Action:    Objective:  Precautions: B knee pain, Migraines, low back pain   Exercises:  Exercise Reps/ Time Weight/ Level Completed  Today Comments    Gaze stabilzation program: week 1     GST far target H/V    GST close target H/V 1' 2 each    eyes remain on target while moving head       VOR cx

## 2025-01-30 ENCOUNTER — APPOINTMENT (OUTPATIENT)
Dept: PHYSICAL THERAPY | Age: 60
End: 2025-01-30
Payer: MEDICAID

## 2025-01-30 ENCOUNTER — HOSPITAL ENCOUNTER (OUTPATIENT)
Dept: PHYSICAL THERAPY | Age: 60
Setting detail: THERAPIES SERIES
Discharge: HOME | End: 2025-01-30
Payer: MEDICAID

## 2025-01-30 PROCEDURE — 97110 THERAPEUTIC EXERCISES: CPT

## 2025-01-30 NOTE — FLOWSHEET NOTE
[facility]   Outpatient Rehabilitation & Therapy  [address]  P: [phone]   F: [phone]    Physical Therapy Daily Treatment Note    Date:  2025  Patient Name:  Julien Batista    :  1965  MRN: [id number]  Physician:      Nettie Alicea PA-C                          Insurance: [employer] ?? -  visits remain for , auth after 30th visit  (pay attention as has 2 plan of cares)   Medical Diagnosis:   M25.561, M25.562, G89.29 (ICD-10-CM) - Bilateral chronic knee pain   M17.0 (ICD-10-CM) - Arthritis of both knees   Rehab Codes: R25 pain , M25.60 stiffness, R53.1 weakness   Onset Date: Chronic (24- Date of referral)               Next 's appt:    Visit# / total visits: 8/10   Cancels/No Shows: 0/0    PRECAUTIONS: Dizziness with position changes    ** has a concurrent vestibular plan of care**     Subjective:      Patient reports still cracking and popping in his joints.  States no issues after last therapy visit.  Reports doing HEP 2-3 times a week.  MRI scheduled for next week.     Pain:  [x] Yes  [] No Location: B knees   Pain Rating: (0-10 scale) 4/10 R knee, 4/10 L knee   Pain altered Tx:  [x] No  [] Yes  Action:  Comments:    Objective:    Exercises:  Exercise Reps/ Time Weight/ Level Completed  Today Comments   Supine Hamstring Stretch 2x30\" ea LE  x    Modified Benji/Quad Stretch  2x30\" ea LE  x Strap to provide quad stretch    Bridges 20x green x    Clamshells 20x green x           Heel Taps F 10x 4\"     Step Ups into 3\" SLS Fwd & lat  15 x ea x sets 8\" x Mild  L knee buckling in stance    4-way hip 15 x ea green x    Mini squats 15 x ea      TG squats- neutral & staggered  15x  ea  L10     Other:    Specific Instructions for next treatment:  - work on pain tolerable loading of B knees   - focus on mechanics with squatting   - stretching to improve flexibility   - vaso if needed for pain      Assessment: [x] Progressing toward goals.  Reviewed HEP

## 2025-02-01 DIAGNOSIS — G89.29 CHRONIC PAIN OF BOTH KNEES: ICD-10-CM

## 2025-02-01 DIAGNOSIS — M25.562 CHRONIC PAIN OF BOTH KNEES: ICD-10-CM

## 2025-02-01 DIAGNOSIS — M25.522 PAIN OF BOTH ELBOWS: ICD-10-CM

## 2025-02-01 DIAGNOSIS — M25.521 PAIN OF BOTH ELBOWS: ICD-10-CM

## 2025-02-01 DIAGNOSIS — M25.561 CHRONIC PAIN OF BOTH KNEES: ICD-10-CM

## 2025-02-04 ENCOUNTER — HOSPITAL ENCOUNTER (OUTPATIENT)
Dept: MRI IMAGING | Age: 60
Discharge: HOME | End: 2025-02-06
Payer: MEDICAID

## 2025-02-04 DIAGNOSIS — M17.0 ARTHRITIS OF BOTH KNEES: ICD-10-CM

## 2025-02-04 DIAGNOSIS — E78.2 MIXED HYPERLIPIDEMIA: ICD-10-CM

## 2025-02-04 DIAGNOSIS — M25.562 BILATERAL CHRONIC KNEE PAIN: ICD-10-CM

## 2025-02-04 DIAGNOSIS — G89.29 BILATERAL CHRONIC KNEE PAIN: ICD-10-CM

## 2025-02-04 DIAGNOSIS — M25.561 BILATERAL CHRONIC KNEE PAIN: ICD-10-CM

## 2025-02-04 DIAGNOSIS — G43.E19 INTRACTABLE CHRONIC MIGRAINE WITH AURA AND WITHOUT STATUS MIGRAINOSUS: ICD-10-CM

## 2025-02-04 DIAGNOSIS — M25.561 RIGHT KNEE PAIN, UNSPECIFIED CHRONICITY: ICD-10-CM

## 2025-02-04 DIAGNOSIS — I10 ESSENTIAL HYPERTENSION: ICD-10-CM

## 2025-02-04 PROCEDURE — 73721 MRI JNT OF LWR EXTRE W/O DYE: CPT

## 2025-02-04 RX ORDER — ATORVASTATIN CALCIUM 10 MG/1
10 TABLET, FILM COATED ORAL DAILY
Qty: 90 TABLET | Refills: 1 | Status: SHIPPED | OUTPATIENT
Start: 2025-02-04

## 2025-02-04 RX ORDER — AMLODIPINE BESYLATE 10 MG/1
10 TABLET ORAL DAILY
Qty: 30 TABLET | Refills: 3 | Status: SHIPPED | OUTPATIENT
Start: 2025-02-04

## 2025-02-04 RX ORDER — DULOXETIN HYDROCHLORIDE 30 MG/1
30 CAPSULE, DELAYED RELEASE ORAL DAILY
Qty: 30 CAPSULE | Refills: 3 | Status: SHIPPED | OUTPATIENT
Start: 2025-02-04

## 2025-02-04 RX ORDER — MAGNESIUM OXIDE 400 MG/1
400 TABLET ORAL DAILY
Qty: 30 TABLET | Refills: 3 | Status: SHIPPED | OUTPATIENT
Start: 2025-02-04

## 2025-02-04 RX ORDER — RIZATRIPTAN BENZOATE 10 MG/1
10 TABLET ORAL EVERY 12 HOURS PRN
Qty: 10 TABLET | Refills: 0 | Status: SHIPPED | OUTPATIENT
Start: 2025-02-04 | End: 2025-02-11

## 2025-02-07 NOTE — RESULT ENCOUNTER NOTE
Please call the patient with his knee MRI results.  For the left knee we recommend discussion for possible knee arthroscopy due to the lateral meniscus tear.  For the right knee he would benefit from rest and formal physical therapy.  If the patient is interested in proceeding with any surgery I would recommend he schedule a follow-up with Dr. Faulkner when he returns to the office at the end of March/early April or we can put in a referral  to Dr. Chan or Dr. Willingham if the patient does not want to wait until then for reevaluation with Dr. Faulkner.

## 2025-02-11 ENCOUNTER — TELEPHONE (OUTPATIENT)
Dept: ORTHOPEDIC SURGERY | Age: 60
End: 2025-02-11

## 2025-02-11 DIAGNOSIS — S83.282A OTHER TEAR OF LATERAL MENISCUS OF LEFT KNEE, UNSPECIFIED WHETHER OLD OR CURRENT TEAR, INITIAL ENCOUNTER: Primary | ICD-10-CM

## 2025-02-11 NOTE — TELEPHONE ENCOUNTER
----- Message from Nettie Alicea PA-C sent at 2/7/2025 12:26 PM EST -----  Please call the patient with his knee MRI results.  For the left knee we recommend discussion for possible knee arthroscopy due to the lateral meniscus tear.  For the right knee he would benefit from rest and formal physical therapy.  If the patient is interested in proceeding with any surgery I would recommend he schedule a follow-up with Dr. Faulkner when he returns to the office at the end of March/early April or we can put in a referral  to Dr. Chan or Dr. Willingham if the patient does not want to wait until then for reevaluation with Dr. Faulkner.

## 2025-02-11 NOTE — TELEPHONE ENCOUNTER
Nettie,     Patient was provided with MRI results and scheduled for an appointment with Dr. Chan to discuss surgical intervention for the left knee.     Patient states that he has completed physical therapy for right knee and feels it has provided inadequate pain relief. Appears that he completed ~15 sessions at the [facility] for bilateral knees. Patient is inquiring about alternative treatment options for right knee. Please advise.

## 2025-02-11 NOTE — TELEPHONE ENCOUNTER
Contacted patient and provided Nettie's response. Patient voices understanding of results. Patient states that he has completed formal physical therapy for bilateral knee and feels it was ineffective for pain control of the right knee. Patient informed that a message will be sent to Nettie regarding his inquiry for alternative treatment options.      Referral was placed for Dr. Chan for the left knee and patient was scheduled for appointment on 3/11/25. Patient declined sooner appointments as he is currently in [place] until early March. Patient did not have a pen on him at the time of scheduling so patient was instructed to hang up and that information could be left on voicemail. Patient voices understanding and disconnected line. Date, time, provider as well as address to [facility] was provided via voicemail. Patient encouraged to call office back with any additional questions and was provided main ortho phone number.

## 2025-02-12 NOTE — TELEPHONE ENCOUNTER
Left voicemail for patient informing him of Nettie's response. Encouraged patient to call back with any additional questions/concerns.

## 2025-02-12 NOTE — TELEPHONE ENCOUNTER
Patient returned call and was provided Nettie's response. Patient voices understanding.   Pt presents to ED with requests for 4th rabies vaccine. Pt states his original injury was he was a bitten by a cat. Denies any pain or swelling.

## 2025-02-20 PROBLEM — Z12.11 COLON CANCER SCREENING: Status: RESOLVED | Noted: 2025-01-21 | Resolved: 2025-02-20

## 2025-02-25 ENCOUNTER — TELEPHONE (OUTPATIENT)
Dept: INTERNAL MEDICINE CLINIC | Age: 60
End: 2025-02-25

## 2025-02-25 ENCOUNTER — TELEPHONE (OUTPATIENT)
Dept: UROLOGY | Age: 60
End: 2025-02-25

## 2025-02-25 NOTE — TELEPHONE ENCOUNTER
Patient called in and believes he has a kidney stone. His symptoms started 2 nights ago and he had some blood in his urine. Today the pain is a lot worse and he is using a heating pad on his back.     Informed patient that I would go to the ED or urgent care who does scans so they can confirm the kidney stone.    Patient is currently in [place], [place]  but has a urgent care down the street that does testing and patient is agreeable to go.

## 2025-02-25 NOTE — TELEPHONE ENCOUNTER
Pt called into the office, stated that he is in a lot of pain on his left side. Patient is currently in [place], is on his way to an urgent care, they are going to do an US. Patient will be home 3/10/2025. Patient is scheduled to see CONSTANCE Strickland to discuss imaging.

## 2025-03-07 ENCOUNTER — HOSPITAL ENCOUNTER (OUTPATIENT)
Dept: PREADMISSION TESTING | Age: 60
Discharge: HOME | End: 2025-03-11

## 2025-03-07 VITALS — HEIGHT: 76 IN | BODY MASS INDEX: 27.64 KG/M2 | WEIGHT: 227 LBS

## 2025-03-07 NOTE — PROGRESS NOTES
Pre-op Instructions For Out-Patient Endoscopy Surgery    Medication Instructions:  Please stop herbs and any supplements now (includes vitamins and minerals).    For these medications:  Dulaglutide (Trulicity), Exenatide (Byetta and Bydureon, Liraglutide (Victoza), Lixisenatide (Adlyxin), Semaglutide (Ozempic and Rybelsus), Tirzepatide (Mounjaro, Zepbound)- Stop 1 week prior if taking weekly or 1 day prior if taking every 12 hours or daily.     Please contact your surgeon and prescribing physician for pre-op instructions for any blood thinners. Stop Diclofenac gel and Naproxen sodium as directed    If you have inhalers/aerosol treatments at home, please use them the morning of your surgery and bring the inhalers with you to the hospital.    Please take the following medications the morning of your surgery with a sip of water:    Amlodipine and Inhaler    Surgery Instructions:  After midnight before surgery:  Do not eat or drink anything, including water, mints, gum, and hard candy.  You may brush your teeth without swallowing.  No smoking, chewing tobacco, or street drugs.     ** Please Follow Bowel Prep instructions if given by surgeon's office**    Please shower or bathe before surgery.       Please do not wear any cologne, lotion, powder, jewelry, piercings, perfume, makeup, nail polish, hair accessories, or hair spray on the day of surgery.  Wear loose comfortable clothing.    Leave your valuables at home but bring a payment source for any after-surgery prescriptions you plan to fill at [facility].  Bring a storage case for any glasses/contacts.    An adult who is responsible for you MUST drive you home and should be with you for the first 24 hours after surgery.     The Day of Surgery:  Arrive at [facility] Surgery Entrance at the time directed by your surgeon and check in at the desk.     If you have a living will or healthcare power of , please bring a copy.    You will be

## 2025-03-10 ENCOUNTER — OFFICE VISIT (OUTPATIENT)
Dept: UROLOGY | Age: 60
End: 2025-03-10
Payer: MEDICAID

## 2025-03-10 VITALS
TEMPERATURE: 97.5 F | HEIGHT: 76 IN | BODY MASS INDEX: 29.1 KG/M2 | DIASTOLIC BLOOD PRESSURE: 84 MMHG | OXYGEN SATURATION: 97 % | HEART RATE: 75 BPM | WEIGHT: 239 LBS | SYSTOLIC BLOOD PRESSURE: 133 MMHG

## 2025-03-10 DIAGNOSIS — N20.0 KIDNEY STONE: ICD-10-CM

## 2025-03-10 DIAGNOSIS — R10.9 FLANK PAIN: Primary | ICD-10-CM

## 2025-03-10 DIAGNOSIS — Z12.5 PROSTATE CANCER SCREENING: ICD-10-CM

## 2025-03-10 PROCEDURE — 99214 OFFICE O/P EST MOD 30 MIN: CPT | Performed by: NURSE PRACTITIONER

## 2025-03-10 PROCEDURE — 3075F SYST BP GE 130 - 139MM HG: CPT | Performed by: NURSE PRACTITIONER

## 2025-03-10 PROCEDURE — 3079F DIAST BP 80-89 MM HG: CPT | Performed by: NURSE PRACTITIONER

## 2025-03-10 ASSESSMENT — ENCOUNTER SYMPTOMS
COUGH: 0
EYE PAIN: 0
EYE REDNESS: 0
DIARRHEA: 0
VOMITING: 0
CONSTIPATION: 0
SHORTNESS OF BREATH: 0
ABDOMINAL PAIN: 0
BACK PAIN: 1
WHEEZING: 0
NAUSEA: 0

## 2025-03-10 NOTE — PROGRESS NOTES
Review of Systems   Constitutional:  Negative for chills, fatigue and fever.   Eyes:  Negative for pain, redness and visual disturbance.   Respiratory:  Negative for cough, shortness of breath and wheezing.    Cardiovascular:  Negative for chest pain and leg swelling.   Gastrointestinal:  Negative for abdominal pain, constipation, diarrhea, nausea and vomiting.   Genitourinary:  Negative for difficulty urinating, dysuria, flank pain, frequency, hematuria, scrotal swelling, testicular pain and urgency.   Musculoskeletal:  Positive for back pain. Negative for joint swelling and myalgias.   Skin:  Negative for rash and wound.   Neurological:  Negative for dizziness, tremors, weakness and numbness.   Hematological:  Does not bruise/bleed easily.     
not relieved by above, or develops fever-- ER.   Will plan to see back in 2 weeks with repeat CT.   Also needs to update PSA.     Return in about 2 weeks (around 3/24/2025) for fu with psa and CT stone protocol with [facility].    Prescriptions Ordered:  No orders of the defined types were placed in this encounter.    Orders Placed:  Orders Placed This Encounter   Procedures    CT ABDOMEN PELVIS WO CONTRAST Additional Contrast? None     Standing Status:   Future     Expected Date:   3/19/2025     Expiration Date:   3/10/2026     Additional Contrast?:   None     Reason for exam::   stones    PSA Screening     Standing Status:   Future     Expected Date:   3/24/2025     Expiration Date:   3/10/2026           ALLIE Rodríguez - CNP    Reviewed and agree with the ROS entered by the MA.

## 2025-03-11 ENCOUNTER — OFFICE VISIT (OUTPATIENT)
Age: 60
End: 2025-03-11

## 2025-03-11 ENCOUNTER — PREP FOR PROCEDURE (OUTPATIENT)
Age: 60
End: 2025-03-11

## 2025-03-11 VITALS — BODY MASS INDEX: 29.1 KG/M2 | HEIGHT: 76 IN | WEIGHT: 239 LBS

## 2025-03-11 DIAGNOSIS — S83.282A OTHER TEAR OF LATERAL MENISCUS OF LEFT KNEE, UNSPECIFIED WHETHER OLD OR CURRENT TEAR, INITIAL ENCOUNTER: ICD-10-CM

## 2025-03-11 DIAGNOSIS — M17.0 ARTHRITIS OF BOTH KNEES: ICD-10-CM

## 2025-03-11 DIAGNOSIS — Z01.818 PRE-OP EXAM: Primary | ICD-10-CM

## 2025-03-11 DIAGNOSIS — M25.562 BILATERAL CHRONIC KNEE PAIN: Primary | ICD-10-CM

## 2025-03-11 DIAGNOSIS — G89.29 BILATERAL CHRONIC KNEE PAIN: Primary | ICD-10-CM

## 2025-03-11 DIAGNOSIS — M25.561 BILATERAL CHRONIC KNEE PAIN: Primary | ICD-10-CM

## 2025-03-11 NOTE — PROGRESS NOTES
[facility] Orthopedics & Sports Medicine      [facility] ORTHOPAEDICS AND SPORTS MEDICINE  [facility]  [place] [address]  Dept: [phone]  Dept Fax: [phone]    Chief Compliant:  Chief Complaint   Patient presents with    Knee Pain     Left knee         History of Present Illness:  This is a 59 y.o. male who presents to the clinic today for evaluation / follow up of left knee pain bothering him to more significant degree over this last year and recently worsening. Patient states that he has constant pain and cracking, worsened by standing up. He has difficulty sleeping at night because his knee pain will worsen if it stays in place too long. Patient used to work in the concrete industry and also  and knows he is in \"bad shape\". Patient had bilateral steroid injections in December 2024 and also tried a Medrol Dose-Reginaldo in January 2025; neither of these helped for more than a few days.  He is also performed physical therapy for the knee.  Patient does note that he had left knee arthroscopy about 15 years ago with better results; he recalls being told that he had multiple bone spurs found at that time.    Physical Exam:    L knee: Negative Lachman test. Pain with internal rotation and varus stress. Tenderness over medial aspect of knee. Good ROM with flexion and extension.    R knee: Negative Lachman test. Pain with external rotation and valgus stress. No tenderness noted. ROM with extension limited to ~150 degrees.    Able to ambulate with steady gait, but with some pain reported mainly in L knee.    Nursing note and vitals reviewed.     Labs and Imaging:     MRI knee left:  IMPRESSION:  1. While not meeting strict MRI criteria for tear there are findings  concerning for oblique tear exiting superiorly involving anterior horn of  lateral meniscus.  2. Mild degenerative changes to the patellofemoral compartment of the knee.  3.

## 2025-03-13 ENCOUNTER — HOSPITAL ENCOUNTER (OUTPATIENT)
Facility: CLINIC | Age: 60
Discharge: HOME | End: 2025-03-15
Payer: MEDICAID

## 2025-03-13 ENCOUNTER — OFFICE VISIT (OUTPATIENT)
Dept: INTERNAL MEDICINE CLINIC | Age: 60
End: 2025-03-13
Payer: MEDICAID

## 2025-03-13 ENCOUNTER — HOSPITAL ENCOUNTER (OUTPATIENT)
Dept: GENERAL RADIOLOGY | Facility: CLINIC | Age: 60
Discharge: HOME | End: 2025-03-15
Payer: MEDICAID

## 2025-03-13 ENCOUNTER — HOSPITAL ENCOUNTER (OUTPATIENT)
Age: 60
Setting detail: SPECIMEN
Discharge: HOME | End: 2025-03-13

## 2025-03-13 VITALS
HEART RATE: 83 BPM | BODY MASS INDEX: 28.86 KG/M2 | DIASTOLIC BLOOD PRESSURE: 88 MMHG | SYSTOLIC BLOOD PRESSURE: 118 MMHG | HEIGHT: 76 IN | OXYGEN SATURATION: 95 % | WEIGHT: 237 LBS

## 2025-03-13 DIAGNOSIS — Z12.11 SCREENING FOR COLON CANCER: ICD-10-CM

## 2025-03-13 DIAGNOSIS — G43.009 MIGRAINE WITHOUT AURA AND WITHOUT STATUS MIGRAINOSUS, NOT INTRACTABLE: ICD-10-CM

## 2025-03-13 DIAGNOSIS — I10 ESSENTIAL HYPERTENSION: Primary | ICD-10-CM

## 2025-03-13 DIAGNOSIS — Z01.818 PRE-OP EXAM: ICD-10-CM

## 2025-03-13 DIAGNOSIS — Z12.5 PROSTATE CANCER SCREENING: ICD-10-CM

## 2025-03-13 DIAGNOSIS — M79.671 RIGHT FOOT PAIN: ICD-10-CM

## 2025-03-13 LAB
ANION GAP SERPL CALCULATED.3IONS-SCNC: 10 MMOL/L (ref 9–16)
BASOPHILS # BLD: 0.04 K/UL (ref 0–0.2)
BASOPHILS NFR BLD: 1 % (ref 0–2)
BUN SERPL-MCNC: 14 MG/DL (ref 6–20)
CALCIUM SERPL-MCNC: 9.7 MG/DL (ref 8.6–10.4)
CHLORIDE SERPL-SCNC: 107 MMOL/L (ref 98–107)
CO2 SERPL-SCNC: 25 MMOL/L (ref 20–31)
CREAT SERPL-MCNC: 1.1 MG/DL (ref 0.7–1.2)
EOSINOPHIL # BLD: 0.09 K/UL (ref 0–0.44)
EOSINOPHILS RELATIVE PERCENT: 2 % (ref 1–4)
ERYTHROCYTE [DISTWIDTH] IN BLOOD BY AUTOMATED COUNT: 12.6 % (ref 11.8–14.4)
GFR, ESTIMATED: 78 ML/MIN/1.73M2
GLUCOSE SERPL-MCNC: 144 MG/DL (ref 74–99)
HCT VFR BLD AUTO: 44 % (ref 40.7–50.3)
HGB BLD-MCNC: 14.7 G/DL (ref 13–17)
IMM GRANULOCYTES # BLD AUTO: 0.03 K/UL (ref 0–0.3)
IMM GRANULOCYTES NFR BLD: 1 %
LYMPHOCYTES NFR BLD: 1.67 K/UL (ref 1.1–3.7)
LYMPHOCYTES RELATIVE PERCENT: 27 % (ref 24–43)
MCH RBC QN AUTO: 31.3 PG (ref 25.2–33.5)
MCHC RBC AUTO-ENTMCNC: 33.4 G/DL (ref 28.4–34.8)
MCV RBC AUTO: 93.8 FL (ref 82.6–102.9)
MONOCYTES NFR BLD: 0.42 K/UL (ref 0.1–1.2)
MONOCYTES NFR BLD: 7 % (ref 3–12)
NEUTROPHILS NFR BLD: 62 % (ref 36–65)
NEUTS SEG NFR BLD: 3.92 K/UL (ref 1.5–8.1)
NRBC BLD-RTO: 0 PER 100 WBC
PLATELET # BLD AUTO: 275 K/UL (ref 138–453)
PMV BLD AUTO: 10.7 FL (ref 8.1–13.5)
POTASSIUM SERPL-SCNC: 4.3 MMOL/L (ref 3.7–5.3)
PSA SERPL-MCNC: 1.28 NG/ML (ref 0–4)
RBC # BLD AUTO: 4.69 M/UL (ref 4.21–5.77)
SODIUM SERPL-SCNC: 142 MMOL/L (ref 136–145)
WBC OTHER # BLD: 6.2 K/UL (ref 3.5–11.3)

## 2025-03-13 PROCEDURE — 99214 OFFICE O/P EST MOD 30 MIN: CPT

## 2025-03-13 PROCEDURE — 3079F DIAST BP 80-89 MM HG: CPT

## 2025-03-13 PROCEDURE — 73630 X-RAY EXAM OF FOOT: CPT

## 2025-03-13 PROCEDURE — 3074F SYST BP LT 130 MM HG: CPT

## 2025-03-13 NOTE — PROGRESS NOTES
[facility]  [address] [address]  Dept: [phone]  Dept Fax: [phone]    OFFICE VISIT NOTE  Date of patient's visit: 3/17/2025  Patient's Name:  Julien Batista YOB: 1965            Patient Care Team:  Cesar Yoo MD as PCP - General (Internal Medicine)  Cesar Yoo MD as PCP - Empaneled Provider  Jim Hightower MD as Consulting Physician (Urology)  _________________________________________    ________________________________________________  Chief Complaint:   Hypertension, Dizziness, and Foot Pain (Pain in foot since patient tripped a month ago.)    _______________________________________________  History of Presenting Illness:  History was obtained from the patient. Julien Batista is a 60 y.o. male.     Follow-up    HTN:  Diagnosed: many yrs ago  Medications: amlodipine;  tolerating the medications well and is compliant.   Associated symptoms: no chest pain, palpitations, headache.   Checks blood pressure at home: Yes; controlled; Has BP kit at home.  Diet: Compliant; counseled on low salt diet, low fat and low cholesterol.  Exercise: Yes  Body mass index is 28.85 kg/m².    BP Readings from Last 3 Encounters:   03/17/25 (!) 140/94   03/13/25 118/88   03/10/25 133/84   Follows cardiology.         Lab Results   Component Value Date    HGB 14.7 03/13/2025    LABA1C 5.6 09/23/2024    CHOL 196 09/17/2024    HDL 36 (L) 09/17/2024     (H) 09/17/2024    TRIG 129 09/17/2024    CREATININE 1.1 03/13/2025     _____________________________________________________  Past Medical/Surgical History:        Diagnosis Date    Arthritis     left knee, and bilateral hands    Asthma     Back pain     Fibromyalgia 2015    H/O cardiac radiofrequency ablation January and March 2015    Headache(784.0)     PVC (premature ventricular contraction)     Restless legs syndrome 2015    Sleep apnea     no C-pap at this time.    SVT

## 2025-03-14 NOTE — PRE-PROCEDURE INSTRUCTIONS
Have you received your Prep?yes Any questions with prep instructions?no  Only Clear Liquid Diet day before.yes  Nothing to eat after midnight day before procedure.  Are you taking any blood thinners? If so, you need to Stop.n/a  Remove any jewelry and body piercings.yes  Do you wear glasses? If so, please bring a case to store them in.  Are you having any Covid symptoms?no  Do you have any new rashes, infections, etc. that we should be aware of?no  Do you have a ride home the day of surgery? It cannot be a cab or medical transportation.yes  Verify surgery time/date and what time to arrive at hospital. 1464/3310

## 2025-03-17 ENCOUNTER — ANESTHESIA EVENT (OUTPATIENT)
Dept: ENDOSCOPY | Age: 60
End: 2025-03-17
Payer: MEDICAID

## 2025-03-17 ENCOUNTER — RESULTS FOLLOW-UP (OUTPATIENT)
Dept: UROLOGY | Age: 60
End: 2025-03-17

## 2025-03-17 ENCOUNTER — OFFICE VISIT (OUTPATIENT)
Age: 60
End: 2025-03-17
Payer: MEDICAID

## 2025-03-17 VITALS
BODY MASS INDEX: 28.86 KG/M2 | HEIGHT: 76 IN | WEIGHT: 237 LBS | DIASTOLIC BLOOD PRESSURE: 94 MMHG | SYSTOLIC BLOOD PRESSURE: 140 MMHG | HEART RATE: 101 BPM | OXYGEN SATURATION: 98 % | TEMPERATURE: 98.3 F

## 2025-03-17 DIAGNOSIS — B07.9 VERRUCA VULGARIS: ICD-10-CM

## 2025-03-17 DIAGNOSIS — L82.1 SEBORRHEIC KERATOSES: ICD-10-CM

## 2025-03-17 DIAGNOSIS — D18.01 CHERRY ANGIOMA: Primary | ICD-10-CM

## 2025-03-17 PROCEDURE — 3080F DIAST BP >= 90 MM HG: CPT | Performed by: DERMATOLOGY

## 2025-03-17 PROCEDURE — 99202 OFFICE O/P NEW SF 15 MIN: CPT | Performed by: DERMATOLOGY

## 2025-03-17 PROCEDURE — 17110 DESTRUCTION B9 LES UP TO 14: CPT | Performed by: DERMATOLOGY

## 2025-03-17 PROCEDURE — 3077F SYST BP >= 140 MM HG: CPT | Performed by: DERMATOLOGY

## 2025-03-17 ASSESSMENT — ENCOUNTER SYMPTOMS
ALLERGIC/IMMUNOLOGIC NEGATIVE: 1
NAUSEA: 0
ABDOMINAL PAIN: 0
COUGH: 0
CONSTIPATION: 0
VOMITING: 0
BACK PAIN: 0
DIARRHEA: 0
SHORTNESS OF BREATH: 0
WHEEZING: 0

## 2025-03-17 NOTE — PROGRESS NOTES
described in this documentation, as scribed by Alisha Holguin in my presence, and it is both accurate and complete.

## 2025-03-17 NOTE — PATIENT INSTRUCTIONS
light brown to skin-colored, flat areas, which are round to oval and of varying size (usually less than a half inch, but sometimes much larger). As they grow thicker and rise above the skin surface, seborrheic keratoses may become dark brown to almost black with a \"stuck on\" appearance. The surface may feel smooth or rough.  Self-Care Guidelines  No treatment is needed unless there is irritation from clothing with itching or bleeding.  There is no way to prevent new spots from forming.  Some lotions with alpha hydroxyl acids may make the areas feel smoother with regular use but will not eliminate them.  OTC freezing techniques are available but usually not effective.  When to Seek Medical Care  If a spot on the skin is growing, bleeding, painful, or itchy, or any other concerning changes, then see your doctor. , Cryotherapy    Liquid Nitrogen - \"freeze\" (Cryotherapy)  Your doctor has treated your skin lesions with a very cold substance.  The liquid nitrogen is so cold that it may feel like the skin is burning during application.  A clear blister or blood blister may form after treatment and may later form a scab.  Leave the area alone.  Usually this scab will fall of within 1-2 weeks.  The area should be kept clean and can be covered with Vaseline and a Band-Aid if needed. If a large blister develops it is ok to use a clean needle to gently pop the blister. Please call our office with any concerns at [phone].

## 2025-03-18 ENCOUNTER — ANESTHESIA (OUTPATIENT)
Dept: ENDOSCOPY | Age: 60
End: 2025-03-18
Payer: MEDICAID

## 2025-03-18 ENCOUNTER — HOSPITAL ENCOUNTER (OUTPATIENT)
Age: 60
Setting detail: OUTPATIENT SURGERY
Discharge: HOME | End: 2025-03-18
Attending: INTERNAL MEDICINE | Admitting: INTERNAL MEDICINE
Payer: MEDICAID

## 2025-03-18 ENCOUNTER — RESULTS FOLLOW-UP (OUTPATIENT)
Dept: GENERAL RADIOLOGY | Facility: CLINIC | Age: 60
End: 2025-03-18

## 2025-03-18 VITALS
HEIGHT: 76 IN | RESPIRATION RATE: 15 BRPM | DIASTOLIC BLOOD PRESSURE: 100 MMHG | WEIGHT: 227 LBS | SYSTOLIC BLOOD PRESSURE: 145 MMHG | BODY MASS INDEX: 27.64 KG/M2 | TEMPERATURE: 97.8 F | OXYGEN SATURATION: 97 % | HEART RATE: 57 BPM

## 2025-03-18 DIAGNOSIS — Z12.11 COLON CANCER SCREENING: ICD-10-CM

## 2025-03-18 PROCEDURE — 2580000003 HC RX 258: Performed by: ANESTHESIOLOGY

## 2025-03-18 PROCEDURE — 88305 TISSUE EXAM BY PATHOLOGIST: CPT

## 2025-03-18 PROCEDURE — 6370000000 HC RX 637 (ALT 250 FOR IP): Performed by: ANESTHESIOLOGY

## 2025-03-18 PROCEDURE — 7100000010 HC PHASE II RECOVERY - FIRST 15 MIN: Performed by: INTERNAL MEDICINE

## 2025-03-18 PROCEDURE — 3609010600 HC COLONOSCOPY POLYPECTOMY SNARE/COLD BIOPSY: Performed by: INTERNAL MEDICINE

## 2025-03-18 PROCEDURE — 3700000000 HC ANESTHESIA ATTENDED CARE: Performed by: INTERNAL MEDICINE

## 2025-03-18 PROCEDURE — 3700000001 HC ADD 15 MINUTES (ANESTHESIA): Performed by: INTERNAL MEDICINE

## 2025-03-18 PROCEDURE — 7100000011 HC PHASE II RECOVERY - ADDTL 15 MIN: Performed by: INTERNAL MEDICINE

## 2025-03-18 PROCEDURE — 6360000002 HC RX W HCPCS: Performed by: ANESTHESIOLOGY

## 2025-03-18 PROCEDURE — 6360000002 HC RX W HCPCS: Performed by: NURSE ANESTHETIST, CERTIFIED REGISTERED

## 2025-03-18 PROCEDURE — 2709999900 HC NON-CHARGEABLE SUPPLY: Performed by: INTERNAL MEDICINE

## 2025-03-18 RX ORDER — SODIUM CHLORIDE 0.9 % (FLUSH) 0.9 %
5-40 SYRINGE (ML) INJECTION PRN
Status: DISCONTINUED | OUTPATIENT
Start: 2025-03-18 | End: 2025-03-18 | Stop reason: HOSPADM

## 2025-03-18 RX ORDER — LIDOCAINE HYDROCHLORIDE 10 MG/ML
INJECTION, SOLUTION EPIDURAL; INFILTRATION; INTRACAUDAL; PERINEURAL
Status: DISCONTINUED | OUTPATIENT
Start: 2025-03-18 | End: 2025-03-18 | Stop reason: SDUPTHER

## 2025-03-18 RX ORDER — SODIUM CHLORIDE 9 MG/ML
INJECTION, SOLUTION INTRAVENOUS PRN
Status: DISCONTINUED | OUTPATIENT
Start: 2025-03-18 | End: 2025-03-18 | Stop reason: HOSPADM

## 2025-03-18 RX ORDER — LIDOCAINE HYDROCHLORIDE 10 MG/ML
1 INJECTION, SOLUTION EPIDURAL; INFILTRATION; INTRACAUDAL; PERINEURAL
Status: COMPLETED | OUTPATIENT
Start: 2025-03-18 | End: 2025-03-18

## 2025-03-18 RX ORDER — SODIUM CHLORIDE 0.9 % (FLUSH) 0.9 %
5-40 SYRINGE (ML) INJECTION EVERY 12 HOURS SCHEDULED
Status: DISCONTINUED | OUTPATIENT
Start: 2025-03-18 | End: 2025-03-18 | Stop reason: HOSPADM

## 2025-03-18 RX ORDER — PROPOFOL 10 MG/ML
INJECTION, EMULSION INTRAVENOUS
Status: DISCONTINUED | OUTPATIENT
Start: 2025-03-18 | End: 2025-03-18 | Stop reason: SDUPTHER

## 2025-03-18 RX ORDER — FENTANYL CITRATE 50 UG/ML
INJECTION, SOLUTION INTRAMUSCULAR; INTRAVENOUS
Status: DISCONTINUED | OUTPATIENT
Start: 2025-03-18 | End: 2025-03-18 | Stop reason: SDUPTHER

## 2025-03-18 RX ORDER — SODIUM CHLORIDE, SODIUM LACTATE, POTASSIUM CHLORIDE, CALCIUM CHLORIDE 600; 310; 30; 20 MG/100ML; MG/100ML; MG/100ML; MG/100ML
INJECTION, SOLUTION INTRAVENOUS CONTINUOUS
Status: DISCONTINUED | OUTPATIENT
Start: 2025-03-18 | End: 2025-03-18 | Stop reason: HOSPADM

## 2025-03-18 RX ORDER — ACETAMINOPHEN 500 MG
1000 TABLET ORAL ONCE
Status: COMPLETED | OUTPATIENT
Start: 2025-03-18 | End: 2025-03-18

## 2025-03-18 RX ADMIN — ACETAMINOPHEN 1000 MG: 500 TABLET ORAL at 13:38

## 2025-03-18 RX ADMIN — PROPOFOL 70 MG: 10 INJECTION, EMULSION INTRAVENOUS at 14:18

## 2025-03-18 RX ADMIN — PROPOFOL 30 MG: 10 INJECTION, EMULSION INTRAVENOUS at 14:33

## 2025-03-18 RX ADMIN — PROPOFOL 30 MG: 10 INJECTION, EMULSION INTRAVENOUS at 14:25

## 2025-03-18 RX ADMIN — PROPOFOL 30 MG: 10 INJECTION, EMULSION INTRAVENOUS at 14:23

## 2025-03-18 RX ADMIN — PROPOFOL 30 MG: 10 INJECTION, EMULSION INTRAVENOUS at 14:27

## 2025-03-18 RX ADMIN — PROPOFOL 30 MG: 10 INJECTION, EMULSION INTRAVENOUS at 14:35

## 2025-03-18 RX ADMIN — SODIUM CHLORIDE, SODIUM LACTATE, POTASSIUM CHLORIDE, AND CALCIUM CHLORIDE: .6; .31; .03; .02 INJECTION, SOLUTION INTRAVENOUS at 12:26

## 2025-03-18 RX ADMIN — PROPOFOL 30 MG: 10 INJECTION, EMULSION INTRAVENOUS at 14:30

## 2025-03-18 RX ADMIN — LIDOCAINE HYDROCHLORIDE 1 ML: 10 INJECTION, SOLUTION EPIDURAL; INFILTRATION; INTRACAUDAL; PERINEURAL at 12:26

## 2025-03-18 RX ADMIN — PROPOFOL 30 MG: 10 INJECTION, EMULSION INTRAVENOUS at 14:38

## 2025-03-18 RX ADMIN — FENTANYL CITRATE 25 MCG: 50 INJECTION INTRAMUSCULAR; INTRAVENOUS at 14:31

## 2025-03-18 RX ADMIN — FENTANYL CITRATE 50 MCG: 50 INJECTION INTRAMUSCULAR; INTRAVENOUS at 14:47

## 2025-03-18 RX ADMIN — FENTANYL CITRATE 25 MCG: 50 INJECTION INTRAMUSCULAR; INTRAVENOUS at 14:30

## 2025-03-18 RX ADMIN — PROPOFOL 50 MG: 10 INJECTION, EMULSION INTRAVENOUS at 14:21

## 2025-03-18 RX ADMIN — PROPOFOL 30 MG: 10 INJECTION, EMULSION INTRAVENOUS at 14:40

## 2025-03-18 RX ADMIN — LIDOCAINE HYDROCHLORIDE 50 MG: 10 INJECTION, SOLUTION EPIDURAL; INFILTRATION; INTRACAUDAL; PERINEURAL at 14:17

## 2025-03-18 ASSESSMENT — ENCOUNTER SYMPTOMS
SHORTNESS OF BREATH: 1
SORE THROAT: 0
APNEA: 1
TROUBLE SWALLOWING: 0
BACK PAIN: 1
SHORTNESS OF BREATH: 0
COUGH: 0

## 2025-03-18 ASSESSMENT — PAIN - FUNCTIONAL ASSESSMENT
PAIN_FUNCTIONAL_ASSESSMENT: 0-10
PAIN_FUNCTIONAL_ASSESSMENT: 0-10

## 2025-03-18 NOTE — OP NOTE
Colonoscopy report    Colonoscopy Procedure Note    Procedure:  Colonoscopy with snare and biopsy    Procedure Date: 3/18/2025    Indications: Colon cancer screening    Sedation:  MAC    Attending Physician:  Dr. Chelsea Brooks MD.     Assistant Physician: None    Consent:   Informed consent was obtained for the procedure after explaining the risks including bleeding, perforation, aspiration, arrhythmia, risks related to sedation, reaction to medications and rarely death, benefits and alternatives to the patient. The patient verbalized understanding and agreed to proceed with the procedure.       Procedure Details:  The patient was placed in the left lateral decubitus position.  Oxygen and cardiac monitoring equipment was attached. The patient's vital signs were monitored continuously  throughout the procedure. After appropriate sedation was achieved, a rectal examination was performed.  The pediatric colon was inserted into the rectum and advanced under direct vision to the cecum which was identified by ileocecal valve and appendiceal orifice.  The quality of the colonic preparation was fair.  A careful inspection was made as the colonoscope was withdrawn, including a retroflexed view of the rectum; findings and interventions are described below.  Appropriate photodocumentation was obtained.           Complications:  None    Estimated blood loss:  Minimal           Disposition:  Home           Condition: stable    Withdrawal Time: 15 minutes    Findings:   The bowel prep was fair.  Multiple small and large mouth diverticula noted throughout the colon.  A 2 mm cecal polyp noted resected with large biopsy forceps.  3 polyps noted in the sigmoid colon measuring 2 to 4 mm, resected with a combination of large biopsy forceps and cold snare.  Retroflexion examination in the rectum with moderate internal hemorrhoids    Specimen Removed: Cecal polyp, sigmoid colon polyps    Endoscopic Impression:    Fair bowel

## 2025-03-18 NOTE — H&P
History    Occupation:    Tobacco Use    Smoking status: Former     Current packs/day: 0.00     Average packs/day: 1 pack/day for 20.0 years (20.0 ttl pk-yrs)     Types: Cigarettes     Start date: 7/7/1989     Quit date: 7/7/2009     Years since quitting: 15.7     Passive exposure: Never    Smokeless tobacco: Never    Tobacco comments:     quit 15 years ago   Vaping Use    Vaping status: Never Used   Substance and Sexual Activity    Alcohol use: Not Currently     Comment: rarely    Drug use: Not Currently     Frequency: 4.0 times per week     Types: Marijuana (Weed)     Comment: CBD sleep aid that contains some thc     Social Drivers of Health     Financial Resource Strain: Patient Declined (7/15/2024)    Overall Financial Resource Strain (CARDIA)     Difficulty of Paying Living Expenses: Patient declined   Food Insecurity: No Food Insecurity (1/15/2025)    Hunger Vital Sign     Worried About Running Out of Food in the Last Year: Never true     Ran Out of Food in the Last Year: Never true   Transportation Needs: No Transportation Needs (1/15/2025)    PRAPARE - Transportation     Lack of Transportation (Medical): No     Lack of Transportation (Non-Medical): No   Housing Stability: High Risk (1/15/2025)    Housing Stability Vital Sign     Unable to Pay for Housing in the Last Year: Yes     Number of Times Moved in the Last Year: 0     Homeless in the Last Year: No           REVIEW OF SYSTEMS      Allergies   Allergen Reactions    Seasonal Itching, Dermatitis and Rash       No current facility-administered medications on file prior to encounter.     Current Outpatient Medications on File Prior to Encounter   Medication Sig Dispense Refill    naproxen sodium (ANAPROX) 550 MG tablet Take 1 tablet by mouth 2 times daily as needed for Pain (Headache) 14 tablet 3    albuterol sulfate HFA (PROVENTIL;VENTOLIN;PROAIR) 108 (90 Base) MCG/ACT inhaler Inhale 2 puffs into the lungs every 4 hours as needed for Wheezing

## 2025-03-18 NOTE — ANESTHESIA PRE PROCEDURE
Department of Anesthesiology  Preprocedure Note       Name:  Julien Batista   Age:  60 y.o.  :  1965                                          MRN:  [id number]         Date:  3/18/2025      Surgeon: Surgeon(s):  Chelsea Brooks MD    Procedure: Procedure(s):  COLORECTAL CANCER SCREENING, NOT HIGH RISK    Medications prior to admission:   Prior to Admission medications    Medication Sig Start Date End Date Taking? Authorizing Provider   amLODIPine (NORVASC) 10 MG tablet Take 1 tablet by mouth daily 25  Yes Cesar Yoo MD   DULoxetine (CYMBALTA) 30 MG extended release capsule Take 1 capsule by mouth daily 25  Yes Cesar Yoo MD   magnesium oxide (MAG-OX) 400 MG tablet Take 1 tablet by mouth daily 25  Yes Cesar Yoo MD   atorvastatin (LIPITOR) 10 MG tablet Take 1 tablet by mouth daily 25  Yes Cesar Yoo MD   rizatriptan (MAXALT-MLT) 10 MG disintegrating tablet DISSOLVE 1 TABLET IN MOUTH ONCE DAILY AS NEEDED FOR MIGRAINE, DO NOT USE WITHIN 24 HOURS OF AN ERGOTAMINE PREPARATION OR A DIFFERENT TRIPTAN. IF ATTACK IS SEVERE GO TO ER 25  Yes ProviderJulian MD   naproxen sodium (ANAPROX) 550 MG tablet Take 1 tablet by mouth 2 times daily as needed for Pain (Headache) 1/15/25  Yes Felicita Todd MD   albuterol sulfate HFA (PROVENTIL;VENTOLIN;PROAIR) 108 (90 Base) MCG/ACT inhaler Inhale 2 puffs into the lungs every 4 hours as needed for Wheezing or Shortness of Breath 10/10/24  Yes Cesar Yoo MD   tamsulosin (FLOMAX) 0.4 MG capsule Take 1 capsule by mouth daily 24  Yes Isra Painting MD   rOPINIRole (REQUIP) 0.5 MG tablet Take 2 tablets by mouth at bedtime   Yes ProviderJulian MD   rizatriptan (MAXALT) 10 MG tablet Take 1 tablet by mouth every 12 hours as needed for Migraine PLEASE SEE ATTACHED FOR DETAILED DIRECTIONS 2/4/25 3/13/25  Cesar Yoo MD   PEG 2893-EJf-ZwZnb-NaCl-NaSulf (PEG-3350/ELECTROLYTES) 236 g SOLR

## 2025-03-18 NOTE — DISCHARGE INSTRUCTIONS
a polyp. It is done for cancer prevention.   In rare cases, larger polyps can cause troublesome symptoms, such as rectal bleeding, abdominal pain, and bowel irregularities. A polyp removal will relieve these symptoms.   Possible Complications   Complications are rare, but no procedure is completely free of risk. If you are planning to have a polypectomy, your doctor will review a list of possible complications, which may include:   Damage to the colon wall   Bleeding   Infection   Adverse reaction to the sedative   Factors that may increase the risk of complications include:   Type, size, and location of the polyp   Patient factors, such as blood-clotting disorders, substance abuse, or other diseases (eg, obesity , diabetes )   What to Expect   Prior to Procedure    Your doctor will likely do the following:   Physical exam and health history   Review of medicines   Test your stool for hidden blood (called \"occult blood\")   X-rays an exam that uses small amounts of radiation to make a picture of the inside of the body   Barium enema x-ray exam that uses contrast to help better see the colon   Diagnostic colonoscopy or sigmoidoscopyexamination of the inside of the intestine with an endoscope   Your colon must be completely cleaned before the procedure. Any stool left in the intestine will block the view. This preparation may start several days before the procedure. Follow your doctor's instructions, which may include any of the following cleansing methods:   Enemas fluid introduced into the rectum to stimulate a bowel movement   Laxativesmedicines that cause you to have soft bowel movements   A clear-liquid diet   Oral cathartic medicinesa large container of fluid to drink, which stimulates a bowel movement   Leading up to your procedure:   Talk to your doctor about your medicines. You may be asked to stop taking some medicines up to one week before the procedure, like:   Anti-inflammatory drugs (eg, aspirin)   Blood

## 2025-03-18 NOTE — ANESTHESIA POSTPROCEDURE EVALUATION
Department of Anesthesiology  Postprocedure Note    Patient: Julien Batista  MRN: [id number]  YOB: 1965  Date of evaluation: 3/18/2025    Procedure Summary       Date: 03/18/25 Room / Location: [place] [address] / [facility]    Anesthesia Start: 1413 Anesthesia Stop: 1513    Procedure: COLONOSCOPY POLYPECTOMY SNARE/BIOPSY (Rectum) Diagnosis:       Colon cancer screening      (Colon cancer screening [Z12.11])    Surgeons: Chelsea Brooks MD Responsible Provider: Janie Natarajan MD    Anesthesia Type: General ASA Status: 2            Anesthesia Type: General    Bam Phase I:      Bam Phase II: Bam Score: 10    Anesthesia Post Evaluation    Comments: POST- ANESTHESIA EVALUATION       Pt Name: Julien Batista  MRN: [id number]  YOB: 1965  Date of evaluation: 3/18/2025  Time:  4:18 PM      BP (!) 145/100   Pulse 57   Temp 97.8 °F (36.6 °C) (Infrared)   Resp 15   Ht 1.93 m (6' 4\")   Wt 103 kg (227 lb)   SpO2 97%   BMI 27.63 kg/m²      Consciousness Level  Awake  Cardiopulmonary Status  Stable  Pain Adequately Treated YES  Nausea / Vomiting  NO  Adequate Hydration  YES  Anesthesia Related Complications NONE      Electronically signed by Aisha Christianson MD on 3/18/2025 at 4:18 PM      No notable events documented.

## 2025-03-19 ENCOUNTER — OFFICE VISIT (OUTPATIENT)
Dept: NEUROLOGY | Age: 60
End: 2025-03-19
Payer: MEDICAID

## 2025-03-19 ENCOUNTER — TELEPHONE (OUTPATIENT)
Dept: INTERNAL MEDICINE CLINIC | Age: 60
End: 2025-03-19

## 2025-03-19 VITALS
BODY MASS INDEX: 29.1 KG/M2 | SYSTOLIC BLOOD PRESSURE: 145 MMHG | HEIGHT: 76 IN | DIASTOLIC BLOOD PRESSURE: 98 MMHG | HEART RATE: 66 BPM | WEIGHT: 239 LBS

## 2025-03-19 DIAGNOSIS — G43.E19 INTRACTABLE CHRONIC MIGRAINE WITH AURA AND WITHOUT STATUS MIGRAINOSUS: Primary | ICD-10-CM

## 2025-03-19 DIAGNOSIS — R42 DIZZINESS: ICD-10-CM

## 2025-03-19 DIAGNOSIS — M79.671 RIGHT FOOT PAIN: Primary | ICD-10-CM

## 2025-03-19 PROCEDURE — 99214 OFFICE O/P EST MOD 30 MIN: CPT | Performed by: STUDENT IN AN ORGANIZED HEALTH CARE EDUCATION/TRAINING PROGRAM

## 2025-03-19 PROCEDURE — 3077F SYST BP >= 140 MM HG: CPT | Performed by: STUDENT IN AN ORGANIZED HEALTH CARE EDUCATION/TRAINING PROGRAM

## 2025-03-19 PROCEDURE — 3080F DIAST BP >= 90 MM HG: CPT | Performed by: STUDENT IN AN ORGANIZED HEALTH CARE EDUCATION/TRAINING PROGRAM

## 2025-03-19 RX ORDER — OXYCODONE AND ACETAMINOPHEN 10; 325 MG/1; MG/1
1 TABLET ORAL EVERY 6 HOURS PRN
COMMUNITY
Start: 2025-02-26

## 2025-03-19 RX ORDER — RIZATRIPTAN BENZOATE 10 MG/1
10 TABLET, ORALLY DISINTEGRATING ORAL
Qty: 30 TABLET | Status: CANCELLED | OUTPATIENT
Start: 2025-03-19

## 2025-03-19 RX ORDER — RIZATRIPTAN BENZOATE 10 MG/1
10 TABLET, ORALLY DISINTEGRATING ORAL
Qty: 10 TABLET | Refills: 3 | Status: SHIPPED | OUTPATIENT
Start: 2025-03-19

## 2025-03-19 NOTE — TELEPHONE ENCOUNTER
Pt would like to see a foot doctor for the pain in his foot. Referral pending, please review and sign.

## 2025-03-19 NOTE — TELEPHONE ENCOUNTER
Patient would like this refilled. He states the dissolving tablet is the only thing that works for his migraines.

## 2025-03-19 NOTE — PROGRESS NOTES
Neurology Clinic Note    [facility]  Department of Neurology  Date of Service: 3/19/2025 at 10:48 AM      CLINIC NOTE -follow-up VISIT    Julien Batista is a 60 y.o. right handed male who came for follow-up for migraine headaches and dizziness  Patient was unaccompanied.     PRESENTING ILLNESS:        He was last seen on 12/11/2020 for.  At that time he was having headaches almost every day.  He was started on Cymbalta 30 mg daily as well as was continued on abortive therapy with Maxalt.  Today he reports significant improvement in headaches both in frequency and duration.  His last headache was yesterday which lasted for 2 hours and was significantly aborted by Maxalt.  Prior to that his headache was in January lasting couple of hours.     Frequency (per week/month) Duration (hours) Pain Intensity (0-10) Associated Symptoms Triggers Abortive medication/acute treatment Response to Acute Treatment Preventative medication efficacy Preventive Medication Compliance MOH Concern Impact on Daily Activities Pattern Changes New/worsening symptoms    3/19/25   Once per month Up to 2 hours 7-8/10 Nausea (mild), aura, photophobia, phonophobia Weather Maxalt.  Last used yesterday Good Reduce frequency/intensity Yes no Mild no no      He continues to feel dizzy which she describes as a constant sensation of disequilibrium rather than episodic spinning.  He has tried vestibular rehab without any significant relief.  He has history of BPPV but reports that the symptoms are different from his BPPV symptoms.  He also complains of tinnitus.    Initial visit:  Patient reports recurrent headaches for the past 5 years.   Frequency: almost everyday    HEADACHE CHARACTERISTICS:  Aura: no  Location: frontal, left temporal  Character: shooting, stabbing pain  Severity: starts at a scale of 10/10  Lasts for : 1-2 hours  Aggravating factors: Headache is made worse by loud noises, bright lights.   Relieving factors: Imitrex,

## 2025-03-20 ENCOUNTER — RESULTS FOLLOW-UP (OUTPATIENT)
Dept: ENDOSCOPY | Age: 60
End: 2025-03-20

## 2025-03-20 LAB — SURGICAL PATHOLOGY REPORT: NORMAL

## 2025-03-28 ENCOUNTER — HOSPITAL ENCOUNTER (OUTPATIENT)
Dept: CT IMAGING | Age: 60
Discharge: HOME | End: 2025-03-30
Payer: MEDICAID

## 2025-03-28 DIAGNOSIS — R10.9 FLANK PAIN: ICD-10-CM

## 2025-03-28 PROCEDURE — 74176 CT ABD & PELVIS W/O CONTRAST: CPT

## 2025-03-28 NOTE — H&P
ORTHOPEDIC PATIENT EVALUATION      HPI / Chief Complaint  Julien Batista is a 60 y.o. male who presents for left knee lateral meniscal tear.    Past Medical History  Julien  has a past medical history of Arthritis, Asthma, Back pain, Fibromyalgia, H/O cardiac radiofrequency ablation, Headache(784.0), PVC (premature ventricular contraction), Restless legs syndrome, Sleep apnea, SVT (supraventricular tachycardia), and Vertigo.    Past Surgical History  Julien  has a past surgical history that includes Cholecystectomy; Rotator cuff repair (Left); Knee arthroscopy (Left); Ankle surgery (Right); Tonsillectomy; Cardiac surgery (01/26/2015); Rotator cuff repair (Left); knee surgery (Left); ablation of dysrhythmic focus (08/06/2016); Carpal tunnel release (Right, 08/2018); Ureter surgery (Left, 09/28/2022); Cautery of turbinates; and Colonoscopy (N/A, 3/18/2025).    Current Medications  No current facility-administered medications for this encounter.     Current Outpatient Medications   Medication Sig Dispense Refill    oxyCODONE-acetaminophen (PERCOCET)  MG per tablet Take 1 tablet by mouth every 6 hours as needed for Pain.      rizatriptan (MAXALT-MLT) 10 MG disintegrating tablet Take 1 tablet by mouth once as needed for Migraine May repeat in 2 hours if needed 10 tablet 3    amLODIPine (NORVASC) 10 MG tablet Take 1 tablet by mouth daily 30 tablet 3    DULoxetine (CYMBALTA) 30 MG extended release capsule Take 1 capsule by mouth daily 30 capsule 3    magnesium oxide (MAG-OX) 400 MG tablet Take 1 tablet by mouth daily 30 tablet 3    atorvastatin (LIPITOR) 10 MG tablet Take 1 tablet by mouth daily 90 tablet 1    PEG 3350-KCl-NaBcb-NaCl-NaSulf (PEG-3350/ELECTROLYTES) 236 g SOLR  (Patient not taking: Reported on 3/13/2025)      MAGNESIUM-OXIDE 400 (240 Mg) MG tablet Take 1 tablet by mouth daily      diclofenac sodium (VOLTAREN) 1 % GEL APPLY 4 GRAMS TOPICALLY 4 TIMES DAILY 100 g 0    blood glucose monitor strips Test  a 60 y.o. old male with left knee lateral meniscal tear.  Plan for left knee arthroscopy with debridement.

## 2025-03-31 ENCOUNTER — RESULTS FOLLOW-UP (OUTPATIENT)
Dept: UROLOGY | Age: 60
End: 2025-03-31

## 2025-04-03 ENCOUNTER — OFFICE VISIT (OUTPATIENT)
Dept: UROLOGY | Age: 60
End: 2025-04-03
Payer: MEDICAID

## 2025-04-03 ENCOUNTER — HOSPITAL ENCOUNTER (OUTPATIENT)
Age: 60
Setting detail: SPECIMEN
Discharge: HOME | End: 2025-04-03

## 2025-04-03 VITALS
HEART RATE: 104 BPM | OXYGEN SATURATION: 98 % | WEIGHT: 239 LBS | DIASTOLIC BLOOD PRESSURE: 84 MMHG | HEIGHT: 76 IN | BODY MASS INDEX: 29.1 KG/M2 | TEMPERATURE: 98 F | SYSTOLIC BLOOD PRESSURE: 120 MMHG

## 2025-04-03 DIAGNOSIS — R10.9 FLANK PAIN: ICD-10-CM

## 2025-04-03 DIAGNOSIS — N20.1 URETERAL CALCULI: Primary | ICD-10-CM

## 2025-04-03 PROCEDURE — 3079F DIAST BP 80-89 MM HG: CPT | Performed by: UROLOGY

## 2025-04-03 PROCEDURE — 3074F SYST BP LT 130 MM HG: CPT | Performed by: UROLOGY

## 2025-04-03 PROCEDURE — 99214 OFFICE O/P EST MOD 30 MIN: CPT | Performed by: UROLOGY

## 2025-04-03 ASSESSMENT — ENCOUNTER SYMPTOMS
WHEEZING: 0
COLOR CHANGE: 0
NAUSEA: 0
RESPIRATORY NEGATIVE: 1
ABDOMINAL PAIN: 0
VOMITING: 0
GASTROINTESTINAL NEGATIVE: 1
COUGH: 0
EYES NEGATIVE: 1
SHORTNESS OF BREATH: 0
EYE REDNESS: 0
BACK PAIN: 0
ALLERGIC/IMMUNOLOGIC NEGATIVE: 1
EYE PAIN: 0

## 2025-04-03 NOTE — PROGRESS NOTES
[facility]  [address]  [facility] [address]  Dept: [phone]    [facility] U[facility] Office Note - Established    Patient:  Julien Batista  YOB: 1965  Date: 4/3/2025    The patient is a 60 y.o. male who presents todayfor evaluation of the following problems:   Chief Complaint   Patient presents with    Results     - CT/PSA results (CT 3/28)         HPI  Here for bph, and stones, psa is wnl  Ct reviewed and showed bilateral ureteral stones  Urinating ok    Summary of old records: N/A    Additional History: N/A    Procedures Today: N/A    Urinalysis today:  No results found for this visit on 04/03/25.  Last several PSA's:  Lab Results   Component Value Date    PSA 1.28 03/13/2025    PSA 0.97 11/06/2023     Last total testosterone:  No results found for: \"TESTOSTERONE\"    AUA Symptom Score (4/3/2025):                               Last BUN and creatinine:  Lab Results   Component Value Date    BUN 14 03/13/2025     Lab Results   Component Value Date    CREATININE 1.1 03/13/2025       Additional Lab/Culture results: none    Imaging Reviewed during this Office Visit: none  (results were independently reviewed by physician and radiology report verified)    PAST MEDICAL, FAMILY AND SOCIAL HISTORY UPDATE:  Past Medical History:   Diagnosis Date    Arthritis     left knee, and bilateral hands    Asthma     Back pain     Fibromyalgia 2015    H/O cardiac radiofrequency ablation January and March 2015    Headache(784.0)     PVC (premature ventricular contraction)     Restless legs syndrome 2015    Sleep apnea     no C-pap at this time.    SVT (supraventricular tachycardia)     Vertigo      Past Surgical History:   Procedure Laterality Date    ABLATION OF DYSRHYTHMIC FOCUS  08/06/2016    pvc's /  DR BUENO    ANKLE SURGERY Right     mole removed    CARDIAC SURGERY  01/26/2015    SVT ablation x2    CARPAL TUNNEL RELEASE Right

## 2025-04-04 ENCOUNTER — TELEPHONE (OUTPATIENT)
Dept: INTERNAL MEDICINE CLINIC | Age: 60
End: 2025-04-04

## 2025-04-04 ENCOUNTER — TELEPHONE (OUTPATIENT)
Dept: UROLOGY | Age: 60
End: 2025-04-04

## 2025-04-04 ENCOUNTER — PREP FOR PROCEDURE (OUTPATIENT)
Dept: UROLOGY | Age: 60
End: 2025-04-04

## 2025-04-04 DIAGNOSIS — N20.0 KIDNEY STONE: ICD-10-CM

## 2025-04-04 LAB
MICROORGANISM SPEC CULT: NO GROWTH
SERVICE CMNT-IMP: NORMAL
SPECIMEN DESCRIPTION: NORMAL

## 2025-04-04 NOTE — TELEPHONE ENCOUNTER
Cysto, (Bilat) URS, HLL, (Bilat) Stent Placement @ [facility] 04/22/25 3:15pm     PAT: 04/11/25 @ 12:30pm-Phone Call    Spoke with patient, procedure information given in office.

## 2025-04-04 NOTE — TELEPHONE ENCOUNTER
Medical surgical clearance request received 04/04/25    Surgeon: Dr. Hightower    Procedure: Cysto, (Bilat) URS, HLL, (Bilat) Stent Placement    Date of Procedure: 04/22/2025    Last appt: 3/13/2025    Next appt: 6/27/2025    PATs received:  No    Placed in Dr. Yoo's box.

## 2025-04-04 NOTE — PROGRESS NOTES
clothing that is easy to put on and take off.     If you will be returning home the same day as your surgery, you will need to have a responsible adult (18 years of age or older) present to drive you home. You will need someone stay with you at home for the first 24 hours following your surgery. This is due to the anesthesia and the medication given to you during surgery and recovery.

## 2025-04-08 ENCOUNTER — HOSPITAL ENCOUNTER (OUTPATIENT)
Age: 60
Setting detail: SPECIMEN
Discharge: HOME | End: 2025-04-08

## 2025-04-08 ENCOUNTER — OFFICE VISIT (OUTPATIENT)
Dept: INTERNAL MEDICINE CLINIC | Age: 60
End: 2025-04-08
Payer: MEDICAID

## 2025-04-08 VITALS
OXYGEN SATURATION: 97 % | BODY MASS INDEX: 29.35 KG/M2 | DIASTOLIC BLOOD PRESSURE: 86 MMHG | SYSTOLIC BLOOD PRESSURE: 120 MMHG | WEIGHT: 241 LBS | HEIGHT: 76 IN | HEART RATE: 63 BPM

## 2025-04-08 DIAGNOSIS — E78.2 MIXED HYPERLIPIDEMIA: ICD-10-CM

## 2025-04-08 DIAGNOSIS — Z01.818 PRE-OP EXAM: Primary | ICD-10-CM

## 2025-04-08 DIAGNOSIS — G47.30 SLEEP APNEA, UNSPECIFIED TYPE: ICD-10-CM

## 2025-04-08 DIAGNOSIS — R73.09 ELEVATED GLUCOSE: ICD-10-CM

## 2025-04-08 DIAGNOSIS — Z28.21 REFUSED PNEUMOCOCCAL VACCINATION: ICD-10-CM

## 2025-04-08 LAB
CHOLEST SERPL-MCNC: 182 MG/DL (ref 0–199)
CHOLESTEROL/HDL RATIO: 4.6
EST. AVERAGE GLUCOSE BLD GHB EST-MCNC: 114 MG/DL
HBA1C MFR BLD: 5.6 % (ref 4–6)
HDLC SERPL-MCNC: 40 MG/DL
LDLC SERPL CALC-MCNC: 92 MG/DL (ref 0–100)
TRIGL SERPL-MCNC: 248 MG/DL
VLDLC SERPL CALC-MCNC: 50 MG/DL (ref 1–30)

## 2025-04-08 PROCEDURE — 3079F DIAST BP 80-89 MM HG: CPT | Performed by: NURSE PRACTITIONER

## 2025-04-08 PROCEDURE — 3074F SYST BP LT 130 MM HG: CPT | Performed by: NURSE PRACTITIONER

## 2025-04-08 PROCEDURE — 99213 OFFICE O/P EST LOW 20 MIN: CPT | Performed by: NURSE PRACTITIONER

## 2025-04-08 NOTE — PROGRESS NOTES
\"Have you been to the ER, urgent care clinic since your last visit?  Hospitalized since your last visit?\"    NO    “Have you seen or consulted any other health care providers outside our system since your last visit?”    NO                 [facility]  [address] [address]  Dept: [phone]  Dept Fax: [phone]    Office Progress/Follow Up Note  Date of patient's visit: 4/8/2025   Patient's Name:  Julien Batista  YOB: 1965            Patient Care Team:  Cesar Yoo MD as PCP - General (Internal Medicine)  Cesar Yoo MD as PCP - Empaneled Provider  Jim Hightower MD as Consulting Physician (Urology)  Preoperative Consultation        Date of Service: 4/8/2025    Vitals:    04/08/25 0759   BP: 120/86   BP Site: Left Upper Arm   Pulse: 63   SpO2: 97%   Weight: 109.3 kg (241 lb)   Height: 1.93 m (6' 4\")      Wt Readings from Last 2 Encounters:   04/16/25 109.3 kg (241 lb)   04/11/25 109.3 kg (241 lb)     BP Readings from Last 3 Encounters:   04/10/25 (!) 130/95   04/08/25 120/86   04/03/25 120/84        Chief Complaint   Patient presents with    Pre-op Exam     CYSTOSCOPY URETEROSCOPY HOLMIUM LASER BILATERAL with Dr. Hightower       This patient presents to the office today for a preoperative consultation at the request of surgeon, Dr Hightower, who plans on performing cystoscopy on April 22.  The current problem began 5 years ago, and symptoms have been unchanged with time.  Conservative therapy: Yes: Flomax, increased water, surgery, which has been somewhat effective..  This consultation is requested for the specific conditions prompting preoperative evaluation (i.e. because of potential affect on operative risk): HTN.      Planned anesthesia: General   Known anesthesia problems: None   Bleeding risk: No recent or remote history of abnormal bleeding   Personal or FH of DVT/PE: No    Patient objection to receiving blood products: No    Patient

## 2025-04-09 ENCOUNTER — RESULTS FOLLOW-UP (OUTPATIENT)
Dept: INTERNAL MEDICINE CLINIC | Age: 60
End: 2025-04-09

## 2025-04-09 ENCOUNTER — TELEPHONE (OUTPATIENT)
Dept: INTERNAL MEDICINE CLINIC | Age: 60
End: 2025-04-09

## 2025-04-09 ENCOUNTER — ANESTHESIA EVENT (OUTPATIENT)
Dept: OPERATING ROOM | Age: 60
End: 2025-04-09
Payer: MEDICAID

## 2025-04-09 DIAGNOSIS — G47.30 SLEEP APNEA, UNSPECIFIED TYPE: Primary | ICD-10-CM

## 2025-04-09 NOTE — TELEPHONE ENCOUNTER
Spoke with patient who stated his machine was on and off working when he was using it.   Patient will need an updated sleep study for new orders.     Please review and sign.

## 2025-04-09 NOTE — TELEPHONE ENCOUNTER
He said he hasn't been using it because he didn't have replacement tubing/filters, etc.  Can he get those without new study?

## 2025-04-09 NOTE — TELEPHONE ENCOUNTER
Patient called in and said that you wanted him to call back with his CPAP settings. Patient has his CPAP settings but his sleep study is more than 5 years old.     In order for patient to get a replacement CPAP machine he will need an updated sleep study, please advise.

## 2025-04-10 ENCOUNTER — ANESTHESIA (OUTPATIENT)
Dept: OPERATING ROOM | Age: 60
End: 2025-04-10
Payer: MEDICAID

## 2025-04-10 ENCOUNTER — HOSPITAL ENCOUNTER (OUTPATIENT)
Age: 60
Setting detail: OUTPATIENT SURGERY
Discharge: HOME | End: 2025-04-10
Attending: ORTHOPAEDIC SURGERY | Admitting: ORTHOPAEDIC SURGERY
Payer: MEDICAID

## 2025-04-10 VITALS
HEIGHT: 76 IN | WEIGHT: 243.4 LBS | DIASTOLIC BLOOD PRESSURE: 95 MMHG | RESPIRATION RATE: 15 BRPM | BODY MASS INDEX: 29.64 KG/M2 | SYSTOLIC BLOOD PRESSURE: 130 MMHG | TEMPERATURE: 96.6 F | HEART RATE: 67 BPM | OXYGEN SATURATION: 92 %

## 2025-04-10 DIAGNOSIS — G89.18 POST-OP PAIN: Primary | ICD-10-CM

## 2025-04-10 PROBLEM — M65.969 SYNOVITIS OF KNEE: Status: ACTIVE | Noted: 2025-04-10

## 2025-04-10 PROCEDURE — 3700000000 HC ANESTHESIA ATTENDED CARE: Performed by: ORTHOPAEDIC SURGERY

## 2025-04-10 PROCEDURE — 6360000002 HC RX W HCPCS: Performed by: STUDENT IN AN ORGANIZED HEALTH CARE EDUCATION/TRAINING PROGRAM

## 2025-04-10 PROCEDURE — 7100000011 HC PHASE II RECOVERY - ADDTL 15 MIN: Performed by: ORTHOPAEDIC SURGERY

## 2025-04-10 PROCEDURE — 6360000002 HC RX W HCPCS: Performed by: NURSE ANESTHETIST, CERTIFIED REGISTERED

## 2025-04-10 PROCEDURE — 3600000014 HC SURGERY LEVEL 4 ADDTL 15MIN: Performed by: ORTHOPAEDIC SURGERY

## 2025-04-10 PROCEDURE — 3600000004 HC SURGERY LEVEL 4 BASE: Performed by: ORTHOPAEDIC SURGERY

## 2025-04-10 PROCEDURE — 64447 NJX AA&/STRD FEMORAL NRV IMG: CPT | Performed by: STUDENT IN AN ORGANIZED HEALTH CARE EDUCATION/TRAINING PROGRAM

## 2025-04-10 PROCEDURE — 7100000000 HC PACU RECOVERY - FIRST 15 MIN: Performed by: ORTHOPAEDIC SURGERY

## 2025-04-10 PROCEDURE — 6360000002 HC RX W HCPCS

## 2025-04-10 PROCEDURE — 7100000010 HC PHASE II RECOVERY - FIRST 15 MIN: Performed by: ORTHOPAEDIC SURGERY

## 2025-04-10 PROCEDURE — 3700000001 HC ADD 15 MINUTES (ANESTHESIA): Performed by: ORTHOPAEDIC SURGERY

## 2025-04-10 PROCEDURE — 2580000003 HC RX 258: Performed by: ANESTHESIOLOGY

## 2025-04-10 PROCEDURE — 2709999900 HC NON-CHARGEABLE SUPPLY: Performed by: ORTHOPAEDIC SURGERY

## 2025-04-10 PROCEDURE — 7100000001 HC PACU RECOVERY - ADDTL 15 MIN: Performed by: ORTHOPAEDIC SURGERY

## 2025-04-10 RX ORDER — LIDOCAINE HYDROCHLORIDE 10 MG/ML
INJECTION, SOLUTION EPIDURAL; INFILTRATION; INTRACAUDAL; PERINEURAL
Status: DISCONTINUED | OUTPATIENT
Start: 2025-04-10 | End: 2025-04-10 | Stop reason: SDUPTHER

## 2025-04-10 RX ORDER — SODIUM CHLORIDE 0.9 % (FLUSH) 0.9 %
5-40 SYRINGE (ML) INJECTION PRN
Status: DISCONTINUED | OUTPATIENT
Start: 2025-04-10 | End: 2025-04-10 | Stop reason: HOSPADM

## 2025-04-10 RX ORDER — SODIUM CHLORIDE 0.9 % (FLUSH) 0.9 %
5-40 SYRINGE (ML) INJECTION EVERY 12 HOURS SCHEDULED
Status: DISCONTINUED | OUTPATIENT
Start: 2025-04-10 | End: 2025-04-10 | Stop reason: HOSPADM

## 2025-04-10 RX ORDER — FENTANYL CITRATE 50 UG/ML
100 INJECTION, SOLUTION INTRAMUSCULAR; INTRAVENOUS ONCE
Status: COMPLETED | OUTPATIENT
Start: 2025-04-10 | End: 2025-04-10

## 2025-04-10 RX ORDER — DEXAMETHASONE SODIUM PHOSPHATE 4 MG/ML
INJECTION, SOLUTION INTRA-ARTICULAR; INTRALESIONAL; INTRAMUSCULAR; INTRAVENOUS; SOFT TISSUE
Status: DISCONTINUED | OUTPATIENT
Start: 2025-04-10 | End: 2025-04-10 | Stop reason: SDUPTHER

## 2025-04-10 RX ORDER — PROPOFOL 10 MG/ML
INJECTION, EMULSION INTRAVENOUS
Status: DISCONTINUED | OUTPATIENT
Start: 2025-04-10 | End: 2025-04-10 | Stop reason: SDUPTHER

## 2025-04-10 RX ORDER — BUPIVACAINE HYDROCHLORIDE 5 MG/ML
INJECTION, SOLUTION EPIDURAL; INTRACAUDAL; PERINEURAL
Status: COMPLETED | OUTPATIENT
Start: 2025-04-10 | End: 2025-04-10

## 2025-04-10 RX ORDER — FENTANYL CITRATE 50 UG/ML
INJECTION, SOLUTION INTRAMUSCULAR; INTRAVENOUS
Status: DISCONTINUED | OUTPATIENT
Start: 2025-04-10 | End: 2025-04-10 | Stop reason: SDUPTHER

## 2025-04-10 RX ORDER — SODIUM CHLORIDE, SODIUM LACTATE, POTASSIUM CHLORIDE, CALCIUM CHLORIDE 600; 310; 30; 20 MG/100ML; MG/100ML; MG/100ML; MG/100ML
INJECTION, SOLUTION INTRAVENOUS CONTINUOUS
Status: DISCONTINUED | OUTPATIENT
Start: 2025-04-10 | End: 2025-04-10 | Stop reason: HOSPADM

## 2025-04-10 RX ORDER — ROPIVACAINE HYDROCHLORIDE 5 MG/ML
INJECTION, SOLUTION EPIDURAL; INFILTRATION; PERINEURAL
Status: COMPLETED | OUTPATIENT
Start: 2025-04-10 | End: 2025-04-10

## 2025-04-10 RX ORDER — ROPIVACAINE HYDROCHLORIDE 5 MG/ML
INJECTION, SOLUTION EPIDURAL; INFILTRATION; PERINEURAL
Status: DISCONTINUED
Start: 2025-04-10 | End: 2025-04-10 | Stop reason: HOSPADM

## 2025-04-10 RX ORDER — SODIUM CHLORIDE 9 MG/ML
INJECTION, SOLUTION INTRAVENOUS PRN
Status: DISCONTINUED | OUTPATIENT
Start: 2025-04-10 | End: 2025-04-10 | Stop reason: HOSPADM

## 2025-04-10 RX ORDER — ASPIRIN 81 MG/1
81 TABLET ORAL 2 TIMES DAILY
Qty: 60 TABLET | Refills: 0 | Status: SHIPPED | OUTPATIENT
Start: 2025-04-10 | End: 2025-05-10

## 2025-04-10 RX ORDER — ONDANSETRON 2 MG/ML
INJECTION INTRAMUSCULAR; INTRAVENOUS
Status: DISCONTINUED | OUTPATIENT
Start: 2025-04-10 | End: 2025-04-10 | Stop reason: SDUPTHER

## 2025-04-10 RX ORDER — HYDROCODONE BITARTRATE AND ACETAMINOPHEN 5; 325 MG/1; MG/1
1 TABLET ORAL EVERY 6 HOURS PRN
Qty: 20 TABLET | Refills: 0 | Status: SHIPPED | OUTPATIENT
Start: 2025-04-10 | End: 2025-04-17

## 2025-04-10 RX ORDER — LIDOCAINE HYDROCHLORIDE 10 MG/ML
1 INJECTION, SOLUTION EPIDURAL; INFILTRATION; INTRACAUDAL; PERINEURAL
Status: DISCONTINUED | OUTPATIENT
Start: 2025-04-10 | End: 2025-04-10 | Stop reason: HOSPADM

## 2025-04-10 RX ORDER — KETOROLAC TROMETHAMINE 30 MG/ML
INJECTION, SOLUTION INTRAMUSCULAR; INTRAVENOUS
Status: DISCONTINUED | OUTPATIENT
Start: 2025-04-10 | End: 2025-04-10 | Stop reason: SDUPTHER

## 2025-04-10 RX ORDER — IBUPROFEN 800 MG/1
800 TABLET, FILM COATED ORAL EVERY 8 HOURS PRN
Qty: 90 TABLET | Refills: 0 | Status: SHIPPED | OUTPATIENT
Start: 2025-04-10

## 2025-04-10 RX ORDER — MIDAZOLAM HYDROCHLORIDE 2 MG/2ML
2 INJECTION, SOLUTION INTRAMUSCULAR; INTRAVENOUS ONCE
Status: COMPLETED | OUTPATIENT
Start: 2025-04-10 | End: 2025-04-10

## 2025-04-10 RX ORDER — LABETALOL HYDROCHLORIDE 5 MG/ML
10 INJECTION, SOLUTION INTRAVENOUS
Status: DISCONTINUED | OUTPATIENT
Start: 2025-04-10 | End: 2025-04-10 | Stop reason: HOSPADM

## 2025-04-10 RX ORDER — PROCHLORPERAZINE EDISYLATE 5 MG/ML
5 INJECTION INTRAMUSCULAR; INTRAVENOUS
Status: DISCONTINUED | OUTPATIENT
Start: 2025-04-10 | End: 2025-04-10 | Stop reason: HOSPADM

## 2025-04-10 RX ORDER — FENTANYL CITRATE 50 UG/ML
INJECTION, SOLUTION INTRAMUSCULAR; INTRAVENOUS
Status: COMPLETED
Start: 2025-04-10 | End: 2025-04-10

## 2025-04-10 RX ORDER — HYDRALAZINE HYDROCHLORIDE 20 MG/ML
10 INJECTION INTRAMUSCULAR; INTRAVENOUS
Status: DISCONTINUED | OUTPATIENT
Start: 2025-04-10 | End: 2025-04-10 | Stop reason: HOSPADM

## 2025-04-10 RX ORDER — NALOXONE HYDROCHLORIDE 0.4 MG/ML
INJECTION, SOLUTION INTRAMUSCULAR; INTRAVENOUS; SUBCUTANEOUS PRN
Status: DISCONTINUED | OUTPATIENT
Start: 2025-04-10 | End: 2025-04-10 | Stop reason: HOSPADM

## 2025-04-10 RX ORDER — MIDAZOLAM HYDROCHLORIDE 1 MG/ML
INJECTION, SOLUTION INTRAMUSCULAR; INTRAVENOUS
Status: COMPLETED
Start: 2025-04-10 | End: 2025-04-10

## 2025-04-10 RX ADMIN — ROPIVACAINE HYDROCHLORIDE 20 ML: 5 INJECTION, SOLUTION EPIDURAL; INFILTRATION; PERINEURAL at 09:12

## 2025-04-10 RX ADMIN — KETOROLAC TROMETHAMINE 30 MG: 30 INJECTION, SOLUTION INTRAMUSCULAR at 11:36

## 2025-04-10 RX ADMIN — FENTANYL CITRATE 100 MCG: 50 INJECTION, SOLUTION INTRAMUSCULAR; INTRAVENOUS at 09:11

## 2025-04-10 RX ADMIN — BUPIVACAINE HYDROCHLORIDE 10 ML: 5 INJECTION, SOLUTION EPIDURAL; INTRACAUDAL; PERINEURAL at 09:12

## 2025-04-10 RX ADMIN — MIDAZOLAM HYDROCHLORIDE 2 MG: 1 INJECTION, SOLUTION INTRAMUSCULAR; INTRAVENOUS at 09:11

## 2025-04-10 RX ADMIN — Medication 2000 MG: at 11:04

## 2025-04-10 RX ADMIN — LIDOCAINE HYDROCHLORIDE 50 MG: 10 INJECTION, SOLUTION EPIDURAL; INFILTRATION; INTRACAUDAL; PERINEURAL at 11:07

## 2025-04-10 RX ADMIN — FENTANYL CITRATE 100 MCG: 50 INJECTION, SOLUTION INTRAMUSCULAR; INTRAVENOUS at 11:07

## 2025-04-10 RX ADMIN — PROPOFOL 200 MG: 10 INJECTION, EMULSION INTRAVENOUS at 11:07

## 2025-04-10 RX ADMIN — MIDAZOLAM HYDROCHLORIDE 2 MG: 2 INJECTION, SOLUTION INTRAMUSCULAR; INTRAVENOUS at 09:11

## 2025-04-10 RX ADMIN — SODIUM CHLORIDE, POTASSIUM CHLORIDE, SODIUM LACTATE AND CALCIUM CHLORIDE: 600; 310; 30; 20 INJECTION, SOLUTION INTRAVENOUS at 09:50

## 2025-04-10 RX ADMIN — ONDANSETRON 4 MG: 2 INJECTION, SOLUTION INTRAMUSCULAR; INTRAVENOUS at 11:27

## 2025-04-10 RX ADMIN — DEXAMETHASONE SODIUM PHOSPHATE 8 MG: 4 INJECTION INTRA-ARTICULAR; INTRALESIONAL; INTRAMUSCULAR; INTRAVENOUS; SOFT TISSUE at 11:10

## 2025-04-10 ASSESSMENT — PAIN DESCRIPTION - DESCRIPTORS: DESCRIPTORS: DULL;ACHING

## 2025-04-10 ASSESSMENT — PAIN - FUNCTIONAL ASSESSMENT
PAIN_FUNCTIONAL_ASSESSMENT: PREVENTS OR INTERFERES SOME ACTIVE ACTIVITIES AND ADLS
PAIN_FUNCTIONAL_ASSESSMENT: NONE - DENIES PAIN
PAIN_FUNCTIONAL_ASSESSMENT: 0-10

## 2025-04-10 NOTE — DISCHARGE INSTRUCTIONS
Activity  You have had anesthesia today  Do not drive, operate heavy equipment, consume alcoholic beverages, or make any important decisions  for 24 hours   If you are taking pain medication: Do not drive or consume alcohol.  Take your time changing positions today. You may feel light headed or dizzy if you move too quickly.   Continue your home medications as ordered by your physician.  Diet   You can eat your normal diet when you feel well. You should start off with bland foods like chicken soup, toast, or yogurt. Then advance as tolerated.  Drink plenty of fluids (unless your doctor tells you not to). Your urine should be very lightly colored without a strong odor.       Activity-weightbearing as tolerated.  Okay to take knee through range of motion.  Okay to perform straight leg raises.  May use crutches for assistance if needed.    Dressing-keep clean dry and intact for 2 days.  Okay to remove at postoperative day 2 and shower and get wet.  Recover with Band-Aids.    Aspirin 81 mg twice a day for 2 weeks for DVT prevention.

## 2025-04-10 NOTE — OP NOTE
Operative Note      Patient: Julien Batista  YOB: 1965  MRN: [id number]    Date of Procedure: 4/10/2025    Pre-Op Diagnosis Codes:      * Acute lateral meniscus tear of left knee [S83.282A]    Post-Op Diagnosis:  Knee synovitis with grade 1 lateral tibial plateau chondromalacia       Procedure(s):  LEFT KNEE ARTHROSCOPY DIAGNOSTIC WITH DEBRIDEMENT AND SYNOVECTOMY    Surgeon(s):  Afshin Chan MD    Assistant:   Physician Assistant: Temitope Jacques PA-C    Anesthesia: General    Estimated Blood Loss (mL): less than 50     Complications: None    Specimens:   * No specimens in log *    Implants:  * No implants in log *      Drains: * No LDAs found *    Findings:  Infection Present At Time Of Surgery (PATOS) (choose all levels that have infection present):  No infection present  Other Findings: None    Detailed Description of Procedure:   This is a 60-year-old male who has left knee pain.  We have discussed with him the risks and benefits of this procedure and he has looked good with this today.  Patient was brought to the operating room placed in supine position received general anesthesia.  His left leg was placed in well-leg segura and the right leg had a pillow placed under the thigh.  We then prepped and draped the left lower extremity in sterile fashion.  Timeout was performed ensuring correct patient correct extremity and correct procedure.  Preoperative antibiotics were assured with 2 g of Ancef.    I made my standard anteromedial and anterolateral working portal.  I came into the patellofemoral compartment.  This looked to be very healthy.  I performed a synovectomy as he did have some synovitis.  I easily came down to the notch evaluated the ACL.  This looked to be in very good condition.    I then came over the lateral compartment.  Here I did not find the meniscal tear that the MRI suspected.  I probed the meniscus very thoroughly it looked to be well intact.  Some grade 1 softening the

## 2025-04-10 NOTE — ANESTHESIA POSTPROCEDURE EVALUATION
Department of Anesthesiology  Postprocedure Note    Patient: Julien Batista  MRN: [id number]  YOB: 1965  Date of evaluation: 4/10/2025    Procedure Summary       Date: 04/10/25 Room / Location: [address]    Anesthesia Start: 1103 Anesthesia Stop: 1147    Procedure: LEFT KNEE ARTHROSCOPY DIAGNOSTIC WITH DEBRIDEMENT AND SYNOVECTOMY (Left: Knee) Diagnosis:       Acute lateral meniscus tear of left knee      (Acute lateral meniscus tear of left knee [S83.282A])    Surgeons: Afshin Chan MD Responsible Provider: Jasmin Arroyo MD    Anesthesia Type: general, regional ASA Status: 3            Anesthesia Type: No value filed.    Bam Phase I: Bam Score: 9    Bam Phase II: Bam Score: 10    Anesthesia Post Evaluation    Patient location during evaluation: PACU  Patient participation: complete - patient participated  Level of consciousness: awake and awake and alert  Pain score: 4  Nausea & Vomiting: no nausea and no vomiting  Cardiovascular status: hemodynamically stable and blood pressure returned to baseline  Respiratory status: acceptable  Hydration status: euvolemic  Multimodal analgesia pain management approach  Pain management: adequate    No notable events documented.

## 2025-04-10 NOTE — ANESTHESIA PRE PROCEDURE
arthritis:..                  ROS comment: fibromyalgia Abdominal: normal exam      Abdomen: soft.      Vascular: negative vascular ROS.         Other Findings:             Anesthesia Plan      general and regional     ASA 3     (Discussed ASA monitors and general LMA anesthesia and pre op nerve block for postop pain with the patient. Risks and benefits discussed. All questions answered.)  Induction: intravenous.    MIPS: Postoperative opioids intended and Prophylactic antiemetics administered.  Anesthetic plan and risks discussed with patient.    Use of blood products discussed with patient whom consented to blood products.    Plan discussed with CRNA.    Attending anesthesiologist reviewed and agrees with Preprocedure content      Post-op pain plan if not by surgeon: single peripheral nerve block            Jasmin Arroyo MD   4/10/2025

## 2025-04-10 NOTE — ANESTHESIA PROCEDURE NOTES
Peripheral Block    Patient location during procedure: pre-op  Reason for block: post-op pain management and at surgeon's request  Start time: 4/10/2025 9:12 AM  End time: 4/10/2025 9:14 AM  Staffing  Performed: anesthesiologist   Anesthesiologist: Jasmin Arroyo MD  Performed by: Jasmin Arroyo MD  Authorized by: Jasmin Arroyo MD    Preanesthetic Checklist  Completed: patient identified, IV checked, site marked, risks and benefits discussed, surgical/procedural consents, equipment checked, pre-op evaluation, timeout performed, anesthesia consent given, oxygen available, monitors applied/VS acknowledged, fire risk safety assessment completed and verbalized and blood product R/B/A discussed and consented  Peripheral Block   Patient position: supine  Prep: ChloraPrep  Provider prep: sterile gloves and mask  Patient monitoring: continuous pulse ox, continuous capnometry, frequent blood pressure checks, IV access, oxygen, responsive to questions and cardiac monitor  Block type: Femoral  Adductor canal  Laterality: left  Injection technique: single-shot  Guidance: paresthesia technique and ultrasound guided    Needle   Needle type: insulated echogenic nerve stimulator needle   Needle gauge: 21 G  Needle localization: anatomical landmarks, ultrasound guidance and paresthesias  Needle length: 8 cm  Assessment   Injection assessment: negative aspiration for heme, no paresthesia on injection, local visualized surrounding nerve on ultrasound and no intravascular symptoms  Paresthesia pain: none  Slow fractionated injection: yes  Hemodynamics: stable  Outcomes: uncomplicated and patient tolerated procedure well    Medications Administered  BUPivacaine (MARCAINE) PF injection 0.5% - Perineural   10 mL - 4/10/2025 9:12:00 AM  ropivacaine (NAROPIN) injection 0.5% - Perineural   20 mL - 4/10/2025 9:12:00 AM

## 2025-04-10 NOTE — TELEPHONE ENCOUNTER
Orders Placed This Encounter   Procedures    Baseline Diagnostic Sleep Study     Standing Status:   Future     Expected Date:   4/10/2025     Expiration Date:   4/9/2026     Adult or Pediatric:   Adult Study (>7 Years)     Location For Sleep Study:   [facility]     [facility] Sleep Lab Location:   [facility]     Pre-Study Patient Questions::   Snores loudly during sleep     Pre-Study Patient Questions::   Complains of daytime sleepiness

## 2025-04-11 ENCOUNTER — HOSPITAL ENCOUNTER (OUTPATIENT)
Dept: PREADMISSION TESTING | Age: 60
Discharge: HOME | End: 2025-04-15

## 2025-04-11 VITALS — WEIGHT: 241 LBS | BODY MASS INDEX: 29.35 KG/M2 | HEIGHT: 76 IN

## 2025-04-11 RX ORDER — NAPROXEN 500 MG/1
500 TABLET ORAL AS NEEDED
COMMUNITY

## 2025-04-11 NOTE — PROGRESS NOTES
Pre-op Instructions For Out-Patient Surgery    Medication Instructions:  Please stop herbs and any supplements now (includes vitamins and minerals).    For these medications:  Dulaglutide (Trulicity), Exenatide (Byetta and Bydureon, Liraglutide (Victoza), Lixisenatide (Adlyxin), Semaglutide (Ozempic and Rybelsus), Tirzepatide (Mounjaro, Zepbound)- Stop 1 week prior if taking weekly or 1 day prior if taking every 12 hours or daily.     Please contact your surgeon and prescribing physician for pre-op instructions for any blood thinners.    If you have inhalers/aerosol treatments at home, please use them the morning of your surgery and bring the inhalers with you to the hospital.    Please take the following medications the morning of your surgery with a sip of water:    amlodipine    Surgery Instructions:  After midnight before surgery:  Do not eat or drink anything, including water, mints, gum, and hard candy.  You may brush your teeth without swallowing.  No smoking, chewing tobacco, or street drugs.    Please shower or bathe before surgery.  If you were given Surgical Scrub Chlorhexidine Gluconate Liquid (CHG), please shower the night before and the morning of your surgery following the detailed instructions you received during your pre-admission visit.     Please do not wear any cologne, lotion, powder, deodorant, jewelry, piercings, perfume, makeup, nail polish, hair accessories, or hair spray on the day of surgery.  Wear loose comfortable clothing.    Leave your valuables at home but bring a payment source for any after-surgery prescriptions you plan to fill at [facility].  Bring a storage case for any glasses/contacts.    An adult who is responsible for you MUST drive you home and should be with you for the first 24 hours after surgery.     If having out-patient knee and foot surgeries, please arrange for planned crutches, walker, or wheelchair before arriving to the

## 2025-04-15 ENCOUNTER — HOSPITAL ENCOUNTER (OUTPATIENT)
Dept: SLEEP CENTER | Age: 60
Discharge: HOME | End: 2025-04-17
Payer: MEDICAID

## 2025-04-15 DIAGNOSIS — G47.30 SLEEP APNEA, UNSPECIFIED TYPE: ICD-10-CM

## 2025-04-15 PROCEDURE — 95810 POLYSOM 6/> YRS 4/> PARAM: CPT

## 2025-04-16 VITALS
HEART RATE: 57 BPM | OXYGEN SATURATION: 94 % | BODY MASS INDEX: 29.35 KG/M2 | WEIGHT: 241 LBS | RESPIRATION RATE: 16 BRPM | HEIGHT: 76 IN

## 2025-04-16 ASSESSMENT — SLEEP AND FATIGUE QUESTIONNAIRES
HOW LIKELY ARE YOU TO NOD OFF OR FALL ASLEEP WHEN YOU ARE A PASSENGER IN A CAR FOR AN HOUR WITHOUT A BREAK: WOULD NEVER DOZE
HOW LIKELY ARE YOU TO NOD OFF OR FALL ASLEEP WHILE WATCHING TV: HIGH CHANCE OF DOZING
HOW LIKELY ARE YOU TO NOD OFF OR FALL ASLEEP WHILE LYING DOWN TO REST IN THE AFTERNOON WHEN CIRCUMSTANCES PERMIT: MODERATE CHANCE OF DOZING
HOW LIKELY ARE YOU TO NOD OFF OR FALL ASLEEP WHILE SITTING AND TALKING TO SOMEONE: WOULD NEVER DOZE
HOW LIKELY ARE YOU TO NOD OFF OR FALL ASLEEP WHILE SITTING INACTIVE IN A PUBLIC PLACE: MODERATE CHANCE OF DOZING
HOW LIKELY ARE YOU TO NOD OFF OR FALL ASLEEP WHILE SITTING AND READING: MODERATE CHANCE OF DOZING
ESS TOTAL SCORE: 11
HOW LIKELY ARE YOU TO NOD OFF OR FALL ASLEEP IN A CAR, WHILE STOPPED FOR A FEW MINUTES IN TRAFFIC: WOULD NEVER DOZE
HOW LIKELY ARE YOU TO NOD OFF OR FALL ASLEEP WHILE SITTING QUIETLY AFTER LUNCH WITHOUT ALCOHOL: MODERATE CHANCE OF DOZING

## 2025-04-18 ENCOUNTER — TELEPHONE (OUTPATIENT)
Age: 60
End: 2025-04-18

## 2025-04-18 NOTE — TELEPHONE ENCOUNTER
Pt called in had questions regarding recent surg on lt knee. States was told that he should have been able to have use of his knee walking by now without any help but still needs help with from a cane and stillhas to walk slowly. Also states is having a lot of pain when bending his knee and when he tries to straighten it out he has a lot of popping.    Would like someone  to call him to see what he should be doing to address issues @ [phone]

## 2025-04-21 ENCOUNTER — TELEPHONE (OUTPATIENT)
Dept: UROLOGY | Age: 60
End: 2025-04-21

## 2025-04-21 ENCOUNTER — ANESTHESIA EVENT (OUTPATIENT)
Dept: OPERATING ROOM | Age: 60
End: 2025-04-21
Payer: MEDICAID

## 2025-04-21 RX ORDER — CALCIUM CITRATE/VITAMIN D3 200MG-6.25
TABLET ORAL
Qty: 100 EACH | Refills: 0 | Status: SHIPPED | OUTPATIENT
Start: 2025-04-21

## 2025-04-21 ASSESSMENT — ENCOUNTER SYMPTOMS
ABDOMINAL PAIN: 0
CONSTIPATION: 0
SORE THROAT: 0
ABDOMINAL PAIN: 0
SINUS PRESSURE: 0
NAUSEA: 0
SHORTNESS OF BREATH: 0
DIARRHEA: 0
EYE DISCHARGE: 0
SHORTNESS OF BREATH: 0
TROUBLE SWALLOWING: 0
COUGH: 0
WHEEZING: 0

## 2025-04-21 NOTE — PRE-PROCEDURE INSTRUCTIONS
No answer, left message ?                             Unable to leave message ?    When were you told to arrive at hospital ?  8568    Do you have a  ?yes    Are you on any blood thinners ?         no            If yes when did you stop taking ?    Do you have your prep Rx filled and instruction ?      Nothing to eat the day before , only clear liquids.    Are you experiencing any covid symptoms ? no    Do you have any infections or rash we should be aware of ?no      Do you have the Hibiclens soap to use the night before and the morning of surgery ?    Nothing to eat or drink after midnight, only a sip of water to take any medication instructed to take the night before.yes  Wear comfortable clothing, leave any valuables at home, remove any jewelry and body piercing . yes

## 2025-04-21 NOTE — TELEPHONE ENCOUNTER
Contacted/spoke with patient. Patient informed of time change for procedure on 04/22/25. Patient verbalizes understanding and call was ended.     Arrival: 11:15am  Procedure Time: 1:00pm

## 2025-04-22 ENCOUNTER — APPOINTMENT (OUTPATIENT)
Dept: GENERAL RADIOLOGY | Age: 60
End: 2025-04-22
Attending: UROLOGY
Payer: MEDICAID

## 2025-04-22 ENCOUNTER — ANESTHESIA (OUTPATIENT)
Dept: OPERATING ROOM | Age: 60
End: 2025-04-22
Payer: MEDICAID

## 2025-04-22 ENCOUNTER — HOSPITAL ENCOUNTER (OUTPATIENT)
Age: 60
Setting detail: OUTPATIENT SURGERY
Discharge: HOME | End: 2025-04-22
Attending: UROLOGY | Admitting: UROLOGY
Payer: MEDICAID

## 2025-04-22 VITALS
SYSTOLIC BLOOD PRESSURE: 140 MMHG | WEIGHT: 235 LBS | RESPIRATION RATE: 16 BRPM | HEART RATE: 58 BPM | OXYGEN SATURATION: 99 % | TEMPERATURE: 97.3 F | DIASTOLIC BLOOD PRESSURE: 94 MMHG | HEIGHT: 76 IN | BODY MASS INDEX: 28.62 KG/M2

## 2025-04-22 DIAGNOSIS — N20.0 KIDNEY STONE: Primary | ICD-10-CM

## 2025-04-22 PROCEDURE — C1769 GUIDE WIRE: HCPCS | Performed by: UROLOGY

## 2025-04-22 PROCEDURE — 3600000013 HC SURGERY LEVEL 3 ADDTL 15MIN: Performed by: UROLOGY

## 2025-04-22 PROCEDURE — 7100000030 HC ASPR PHASE II RECOVERY - FIRST 15 MIN: Performed by: UROLOGY

## 2025-04-22 PROCEDURE — 2709999900 HC NON-CHARGEABLE SUPPLY: Performed by: UROLOGY

## 2025-04-22 PROCEDURE — 7100000011 HC PHASE II RECOVERY - ADDTL 15 MIN: Performed by: UROLOGY

## 2025-04-22 PROCEDURE — 7100000010 HC PHASE II RECOVERY - FIRST 15 MIN: Performed by: UROLOGY

## 2025-04-22 PROCEDURE — 3700000001 HC ADD 15 MINUTES (ANESTHESIA): Performed by: UROLOGY

## 2025-04-22 PROCEDURE — 3600000003 HC SURGERY LEVEL 3 BASE: Performed by: UROLOGY

## 2025-04-22 PROCEDURE — 6360000002 HC RX W HCPCS: Performed by: NURSE ANESTHETIST, CERTIFIED REGISTERED

## 2025-04-22 PROCEDURE — 3700000000 HC ANESTHESIA ATTENDED CARE: Performed by: UROLOGY

## 2025-04-22 PROCEDURE — 7100000001 HC PACU RECOVERY - ADDTL 15 MIN: Performed by: UROLOGY

## 2025-04-22 PROCEDURE — 6370000000 HC RX 637 (ALT 250 FOR IP): Performed by: ANESTHESIOLOGY

## 2025-04-22 PROCEDURE — 7100000000 HC PACU RECOVERY - FIRST 15 MIN: Performed by: UROLOGY

## 2025-04-22 PROCEDURE — 7100000031 HC ASPR PHASE II RECOVERY - ADDTL 15 MIN: Performed by: UROLOGY

## 2025-04-22 PROCEDURE — C2617 STENT, NON-COR, TEM W/O DEL: HCPCS | Performed by: UROLOGY

## 2025-04-22 PROCEDURE — 2720000010 HC SURG SUPPLY STERILE: Performed by: UROLOGY

## 2025-04-22 PROCEDURE — C1758 CATHETER, URETERAL: HCPCS | Performed by: UROLOGY

## 2025-04-22 PROCEDURE — 2580000003 HC RX 258: Performed by: ANESTHESIOLOGY

## 2025-04-22 DEVICE — URETERAL STENT
Type: IMPLANTABLE DEVICE | Site: URETER | Status: FUNCTIONAL
Brand: POLARIS™ ULTRA

## 2025-04-22 RX ORDER — DEXAMETHASONE SODIUM PHOSPHATE 4 MG/ML
INJECTION, SOLUTION INTRA-ARTICULAR; INTRALESIONAL; INTRAMUSCULAR; INTRAVENOUS; SOFT TISSUE
Status: DISCONTINUED | OUTPATIENT
Start: 2025-04-22 | End: 2025-04-22 | Stop reason: SDUPTHER

## 2025-04-22 RX ORDER — LIDOCAINE HYDROCHLORIDE 10 MG/ML
1 INJECTION, SOLUTION EPIDURAL; INFILTRATION; INTRACAUDAL; PERINEURAL
Status: DISCONTINUED | OUTPATIENT
Start: 2025-04-22 | End: 2025-04-22 | Stop reason: HOSPADM

## 2025-04-22 RX ORDER — SODIUM CHLORIDE 0.9 % (FLUSH) 0.9 %
5-40 SYRINGE (ML) INJECTION PRN
Status: DISCONTINUED | OUTPATIENT
Start: 2025-04-22 | End: 2025-04-22 | Stop reason: HOSPADM

## 2025-04-22 RX ORDER — SODIUM CHLORIDE, SODIUM LACTATE, POTASSIUM CHLORIDE, CALCIUM CHLORIDE 600; 310; 30; 20 MG/100ML; MG/100ML; MG/100ML; MG/100ML
INJECTION, SOLUTION INTRAVENOUS CONTINUOUS
Status: DISCONTINUED | OUTPATIENT
Start: 2025-04-22 | End: 2025-04-22 | Stop reason: HOSPADM

## 2025-04-22 RX ORDER — SODIUM CHLORIDE 9 MG/ML
INJECTION, SOLUTION INTRAVENOUS PRN
Status: DISCONTINUED | OUTPATIENT
Start: 2025-04-22 | End: 2025-04-22 | Stop reason: HOSPADM

## 2025-04-22 RX ORDER — NALOXONE HYDROCHLORIDE 0.4 MG/ML
INJECTION, SOLUTION INTRAMUSCULAR; INTRAVENOUS; SUBCUTANEOUS PRN
Status: DISCONTINUED | OUTPATIENT
Start: 2025-04-22 | End: 2025-04-22 | Stop reason: HOSPADM

## 2025-04-22 RX ORDER — SODIUM CHLORIDE 0.9 % (FLUSH) 0.9 %
5-40 SYRINGE (ML) INJECTION EVERY 12 HOURS SCHEDULED
Status: DISCONTINUED | OUTPATIENT
Start: 2025-04-22 | End: 2025-04-22 | Stop reason: HOSPADM

## 2025-04-22 RX ORDER — LIDOCAINE HYDROCHLORIDE 20 MG/ML
INJECTION, SOLUTION INTRAVENOUS
Status: DISCONTINUED | OUTPATIENT
Start: 2025-04-22 | End: 2025-04-22 | Stop reason: SDUPTHER

## 2025-04-22 RX ORDER — DIPHENHYDRAMINE HYDROCHLORIDE 50 MG/ML
12.5 INJECTION, SOLUTION INTRAMUSCULAR; INTRAVENOUS
Status: DISCONTINUED | OUTPATIENT
Start: 2025-04-22 | End: 2025-04-22 | Stop reason: HOSPADM

## 2025-04-22 RX ORDER — HYDRALAZINE HYDROCHLORIDE 20 MG/ML
10 INJECTION INTRAMUSCULAR; INTRAVENOUS
Status: DISCONTINUED | OUTPATIENT
Start: 2025-04-22 | End: 2025-04-22 | Stop reason: HOSPADM

## 2025-04-22 RX ORDER — HYDROCODONE BITARTRATE AND ACETAMINOPHEN 5; 325 MG/1; MG/1
1 TABLET ORAL EVERY 4 HOURS PRN
Qty: 20 TABLET | Refills: 0 | Status: SHIPPED | OUTPATIENT
Start: 2025-04-22 | End: 2025-04-27

## 2025-04-22 RX ORDER — GABAPENTIN 300 MG/1
300 CAPSULE ORAL ONCE
Status: COMPLETED | OUTPATIENT
Start: 2025-04-22 | End: 2025-04-22

## 2025-04-22 RX ORDER — ONDANSETRON 2 MG/ML
4 INJECTION INTRAMUSCULAR; INTRAVENOUS
Status: DISCONTINUED | OUTPATIENT
Start: 2025-04-22 | End: 2025-04-22 | Stop reason: HOSPADM

## 2025-04-22 RX ORDER — PROPOFOL 10 MG/ML
INJECTION, EMULSION INTRAVENOUS
Status: DISCONTINUED | OUTPATIENT
Start: 2025-04-22 | End: 2025-04-22 | Stop reason: SDUPTHER

## 2025-04-22 RX ORDER — ACETAMINOPHEN 500 MG
1000 TABLET ORAL ONCE
Status: COMPLETED | OUTPATIENT
Start: 2025-04-22 | End: 2025-04-22

## 2025-04-22 RX ORDER — METOCLOPRAMIDE HYDROCHLORIDE 5 MG/ML
10 INJECTION INTRAMUSCULAR; INTRAVENOUS
Status: DISCONTINUED | OUTPATIENT
Start: 2025-04-22 | End: 2025-04-22 | Stop reason: HOSPADM

## 2025-04-22 RX ORDER — FENTANYL CITRATE 50 UG/ML
INJECTION, SOLUTION INTRAMUSCULAR; INTRAVENOUS
Status: DISCONTINUED | OUTPATIENT
Start: 2025-04-22 | End: 2025-04-22 | Stop reason: SDUPTHER

## 2025-04-22 RX ORDER — LABETALOL HYDROCHLORIDE 5 MG/ML
10 INJECTION, SOLUTION INTRAVENOUS
Status: DISCONTINUED | OUTPATIENT
Start: 2025-04-22 | End: 2025-04-22 | Stop reason: HOSPADM

## 2025-04-22 RX ORDER — ONDANSETRON 2 MG/ML
INJECTION INTRAMUSCULAR; INTRAVENOUS
Status: DISCONTINUED | OUTPATIENT
Start: 2025-04-22 | End: 2025-04-22 | Stop reason: SDUPTHER

## 2025-04-22 RX ORDER — LIDOCAINE HYDROCHLORIDE 10 MG/ML
INJECTION, SOLUTION EPIDURAL; INFILTRATION; INTRACAUDAL; PERINEURAL
Status: DISCONTINUED | OUTPATIENT
Start: 2025-04-22 | End: 2025-04-22

## 2025-04-22 RX ORDER — HYDROCODONE BITARTRATE AND ACETAMINOPHEN 5; 325 MG/1; MG/1
1 TABLET ORAL EVERY 6 HOURS PRN
Status: DISCONTINUED | OUTPATIENT
Start: 2025-04-22 | End: 2025-04-22 | Stop reason: HOSPADM

## 2025-04-22 RX ORDER — CEFAZOLIN SODIUM 1 G/3ML
INJECTION, POWDER, FOR SOLUTION INTRAMUSCULAR; INTRAVENOUS
Status: DISCONTINUED | OUTPATIENT
Start: 2025-04-22 | End: 2025-04-22 | Stop reason: SDUPTHER

## 2025-04-22 RX ORDER — FENTANYL CITRATE 0.05 MG/ML
25 INJECTION, SOLUTION INTRAMUSCULAR; INTRAVENOUS EVERY 5 MIN PRN
Status: DISCONTINUED | OUTPATIENT
Start: 2025-04-22 | End: 2025-04-22 | Stop reason: HOSPADM

## 2025-04-22 RX ORDER — CEPHALEXIN 500 MG/1
500 CAPSULE ORAL 3 TIMES DAILY
Qty: 15 CAPSULE | Refills: 0 | Status: SHIPPED | OUTPATIENT
Start: 2025-04-22

## 2025-04-22 RX ADMIN — DEXAMETHASONE SODIUM PHOSPHATE 4 MG: 4 INJECTION INTRA-ARTICULAR; INTRALESIONAL; INTRAMUSCULAR; INTRAVENOUS; SOFT TISSUE at 13:12

## 2025-04-22 RX ADMIN — ONDANSETRON 4 MG: 2 INJECTION, SOLUTION INTRAMUSCULAR; INTRAVENOUS at 13:12

## 2025-04-22 RX ADMIN — GABAPENTIN 300 MG: 300 CAPSULE ORAL at 11:57

## 2025-04-22 RX ADMIN — ACETAMINOPHEN 1000 MG: 500 TABLET ORAL at 11:56

## 2025-04-22 RX ADMIN — PROPOFOL 200 MG: 10 INJECTION, EMULSION INTRAVENOUS at 13:03

## 2025-04-22 RX ADMIN — LIDOCAINE HYDROCHLORIDE 100 MG: 20 INJECTION INTRAVENOUS at 13:00

## 2025-04-22 RX ADMIN — SODIUM CHLORIDE, POTASSIUM CHLORIDE, SODIUM LACTATE AND CALCIUM CHLORIDE: 600; 310; 30; 20 INJECTION, SOLUTION INTRAVENOUS at 12:03

## 2025-04-22 RX ADMIN — FENTANYL CITRATE 100 MCG: 50 INJECTION INTRAMUSCULAR; INTRAVENOUS at 13:00

## 2025-04-22 RX ADMIN — CEFAZOLIN 2 G: 1 INJECTION, POWDER, FOR SOLUTION INTRAMUSCULAR; INTRAVENOUS at 13:06

## 2025-04-22 ASSESSMENT — PAIN - FUNCTIONAL ASSESSMENT
PAIN_FUNCTIONAL_ASSESSMENT: NONE - DENIES PAIN
PAIN_FUNCTIONAL_ASSESSMENT: 0-10
PAIN_FUNCTIONAL_ASSESSMENT: 0-10

## 2025-04-22 ASSESSMENT — ENCOUNTER SYMPTOMS: SHORTNESS OF BREATH: 0

## 2025-04-22 ASSESSMENT — PAIN SCALES - GENERAL: PAINLEVEL_OUTOF10: 0

## 2025-04-22 NOTE — ANESTHESIA POSTPROCEDURE EVALUATION
Department of Anesthesiology  Postprocedure Note    Patient: Julien Batista  MRN: [id number]  YOB: 1965  Date of evaluation: 4/22/2025    Procedure Summary       Date: 04/22/25 Room / Location: [place] [address] / [facility]    Anesthesia Start: 1257 Anesthesia Stop: 1400    Procedure: CYSTOSCOPY URETEROSCOPY HOLMIUM LASER BILATERAL WITH BILATERAL STENT PLACEMENT (Bilateral: Ureter) Diagnosis:       Kidney stone      (Kidney stone [N20.0])    Surgeons: Jim Hightower MD Responsible Provider: Aisha Christianson MD    Anesthesia Type: General ASA Status: 2            Anesthesia Type: General    Bam Phase I: Bam Score: 10    Bam Phase II: Bam Score: 10    Anesthesia Post Evaluation    Comments: POST- ANESTHESIA EVALUATION       Pt Name: Julien Batista  MRN: [id number]  YOB: 1965  Date of evaluation: 4/22/2025  Time:  3:17 PM      BP (!) 140/94   Pulse 58   Temp 97.3 °F (36.3 °C) (Infrared)   Resp 16   Ht 1.93 m (6' 4\")   Wt 106.6 kg (235 lb)   SpO2 99%   BMI 28.61 kg/m²      Consciousness Level  Awake  Cardiopulmonary Status  Stable  Pain Adequately Treated YES  Nausea / Vomiting  NO  Adequate Hydration  YES  Anesthesia Related Complications NONE      Electronically signed by Alonso Cisneros MD on 4/22/2025 at 3:17 PM           No notable events documented.

## 2025-04-22 NOTE — ANESTHESIA POSTPROCEDURE EVALUATION
Department of Anesthesiology  Postprocedure Note    Patient: Julien Batista  MRN: [id number]  YOB: 1965  Date of evaluation: 4/22/2025    Procedure Summary       Date: 04/22/25 Room / Location: [place] [address] / [facility]    Anesthesia Start: 1257 Anesthesia Stop: 1400    Procedure: CYSTOSCOPY URETEROSCOPY HOLMIUM LASER BILATERAL WITH BILATERAL STENT PLACEMENT (Bilateral: Ureter) Diagnosis:       Kidney stone      (Kidney stone [N20.0])    Surgeons: Jim Hightower MD Responsible Provider: Aisha Christianson MD    Anesthesia Type: General ASA Status: 2            Anesthesia Type: General    Bam Phase I: Bam Score: 10    Bam Phase II: Bam Score: 10    Anesthesia Post Evaluation    Comments: POST- ANESTHESIA EVALUATION       Pt Name: Julien Batista  MRN: [id number]  YOB: 1965  Date of evaluation: 4/22/2025  Time:  3:16 PM      BP (!) 140/94   Pulse 58   Temp 97.3 °F (36.3 °C) (Infrared)   Resp 16   Ht 1.93 m (6' 4\")   Wt 106.6 kg (235 lb)   SpO2 99%   BMI 28.61 kg/m²      Consciousness Level  Awake  Cardiopulmonary Status  Stable  Pain Adequately Treated YES  Nausea / Vomiting  NO  Adequate Hydration  YES  Anesthesia Related Complications NONE      Electronically signed by Alonso Cisneros MD on 4/22/2025 at 3:16 PM           No notable events documented.

## 2025-04-22 NOTE — H&P
HISTORY and PHYSICAL  [facility]       NAME:  Julien Batista  MRN: [id number]   YOB: 1965   Date: 4/22/2025   Age: 60 y.o.  Gender: male       COMPLAINT AND PRESENT HISTORY:     Julien Batista is 60 y.o., male, undergoing for Procedure(s):  CYSTOSCOPY URETEROSCOPY HOLMIUM LASER BILATERAL    Patient will be having : Pre-Op Diagnosis Codes:      * Kidney stone     Pt has 6mm stone in left mid ureter with hydronephrosis     Patient has significant hx of kidney stones.   Pt has had prior HOLMIUM, CYSTOSCOPY, URETEROSCOPY, STENT PLACEMENT performed by Jim Hightower MD in 2022.      Pt has hx pain in the bilaterally flank,  hematuria with pain at 8/10 in its severity . presently denies any pain .   Mid febr in [place]    Kidney hx started 5 years ago.  Pt is on flomax.  Present sxs started 3 mos ago in mid Febr 2025 in [place].    Pt admits to current slow stream with urination since 5 mos ago. Possible prostate problems.   Pt denies any  current nausea,  vomiting or diaphoresis. No discoloration of the urine, no dysuria. No frequency or urgency.  Pt denies any hx of UTIs.   No fever or chills.    SIGNIFICANT MEDICAL HISTORY reviewed SVT, PVC, htn, DAT- no machine,  Asthma. S/p Ablation of dysrhythmic focus x 3  Pt denies any chest pain or SOB. Pt admits to sporadic irreg HB.     Patient did obtain Medical clearance.   Anticoagulants or blood thinners: aspirin, ibuprofen, naprosyn    Patient voices feeling well today. Denies any recent fever or chills, chest pain or SOB.     Patient denies any personal or family problems with anesthesia.     Imaging:     CT OF THE ABDOMEN AND PELVIS WITHOUT CONTRAST 3/28/2025     IMPRESSION:  6 mm obstructing calculus within the left mid ureter.  Proximal hydroureter  and hydronephrosis.     6 mm calculus within the mid right ureter.  No significant ureteral or right  collecting system dilation.     RECENT LABS, IMAGING AND TESTING     Lab Results  "Chief Complaint  Annual Exam    Subjective          Smiley Sibley presents to [facility]  History of Present Illness    Patient states that she is overall doing well at this time.     She states that she fell about a month ago, She states that she had a black eye, but has not had any lasting problems. She has not had any further falls and states that this one was mechanical as she tripped over the foot af a recliner.     She states that all of her medications are doing well and she states that she has been having no significant issues with this.     She is due for prevnar, shingrix and Td at this time.     She is up to date on breast, and colon cancer screening      Objective   Vital Signs:   /60   Pulse 77   Ht 165.1 cm (65\")   Wt 70.8 kg (156 lb)   SpO2 100%   BMI 25.96 kg/m²     Body mass index is 25.96 kg/m².    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   Surgical History: has a past surgical history that includes Dilation and curettage of uterus; Hysterotomy; Cardiac electrophysiology procedure (N/A, 09/13/2018); Ablation of dysrhythmic focus (09/13/2018); Cardiac catheterization (2010); Insert / replace / remove pacemaker (07/29/2010); Cardiac defibrillator placement (07/29/2010); Colonoscopy; Cardiac electrophysiology procedure (N/A, 07/20/2020); " Bronchoscopy (N/A, 03/30/2023); Endometrial ablation; and Bronchoscopy (3/30/23).   Family History: family history includes Arrhythmia in her brother, brother, and mother; Cancer in her father and mother; Diabetes in her father; Heart disease in her brother, brother, and mother; Heart failure in her mother; Hyperlipidemia in her father; Hypertension in her father.   Social History: reports that she has never smoked. She has never used smokeless tobacco. She reports that she does not currently use alcohol after a past usage of about 1.0 - 2.0 standard drink of alcohol per week. She reports that she does not use drugs.      Current Outpatient Medications:     amiodarone (PACERONE) 200 MG tablet, TAKE 1 TABLET BY MOUTH DAILY, Disp: 14 tablet, Rfl: 0    Eliquis 5 MG tablet tablet, TAKE ONE TABLET BY MOUTH EVERY 12 HOURS, Disp: 180 tablet, Rfl: 2    Farxiga 10 MG tablet, , Disp: , Rfl:     furosemide (LASIX) 20 MG tablet, Take 1 tablet by mouth Daily., Disp: , Rfl:     sacubitril-valsartan (Entresto) 24-26 MG tablet, Take 1 tablet by mouth Daily., Disp: , Rfl:     Allergies: Patient has no known allergies.    Physical Exam  Constitutional:       General: She is not in acute distress.     Appearance: She is not ill-appearing or toxic-appearing.   HENT:      Head: Normocephalic and atraumatic.   Cardiovascular:      Rate and Rhythm: Normal rate and regular rhythm.      Heart sounds: No murmur heard.  Pulmonary:      Effort: Pulmonary effort is normal. No respiratory distress.   Musculoskeletal:      Right lower leg: No edema.      Left lower leg: No edema.   Neurological:      General: No focal deficit present.      Mental Status: She is alert and oriented to person, place, and time.   Psychiatric:         Mood and Affect: Mood normal.         Thought Content: Thought content normal.          Result Review :                   Assessment and Plan    Diagnoses and all orders for this visit:    1. Well adult exam (Primary)  -      Comprehensive Metabolic Panel  -     CBC & Differential  -     Hemoglobin A1c  -     Lipid Panel  -     TSH  -     T4, Free    2. Sustained VT (ventricular tachycardia)    3. Gastroesophageal reflux disease, unspecified whether esophagitis present    4. Elevated TSH  -     TSH  -     T4, Free    5. Hypertrophic nonobstructive cardiomyopathy  -     Comprehensive Metabolic Panel  -     CBC & Differential    6. Mixed hyperlipidemia    7. Elevated glucose  -     Hemoglobin A1c    Other orders  -     Td Vaccine => 8yo PF (Tenivac) 5-2  -     Pneumococcal Conjugate Vaccine 20-Valent (PCV20)    Blood work ordered and will contact with results when available. Will adjust medications based on abnormalities seen.     She does not need refills at this time.     Td and Prevnar 20 given in the office.     Past medical and surgical history as well as allergies, family history and social history were reviewed, and discussed with patient.  Chronic conditions were reviewed as well as medications.   Anticipatory guidance handouts including healthy diet, health maintenance, as well as regular exercise and general instructions were given via [employer], and patient was able to ask questions and discuss any concerns.        Follow Up   No follow-ups on file.  Patient was given instructions and counseling regarding her condition or for health maintenance advice. Please see specific information pulled into the AVS if appropriate.     Coral Granda, DO

## 2025-04-22 NOTE — DISCHARGE INSTRUCTIONS
DISCHARGE INSTRUCTIONS FOR CYSTOSCOPY  MAY PULL STRINGS TO REMOVE STENTS IN 5 DAYS    In order to continue your care at home, please follow the instructions below.    For General Anesthesia  Do not drink any alcoholic beverages or make any legal or important decisions for 24 hours.   No driving or operating machinery for 24 hours.     Diet    Drink plenty of fluids after surgery, unless you are on a fluid restriction.  After general anesthesia, start out eating lightly (broth, soup, crackers, toast, etc.) advancing as tolerated to your usual diet.  Try to avoid spicy or greasy/fatty foods for 24 hours.  Avoid milk products for several hours.    Medications  Take medications as instructed by your surgeon.   Please do not take prescribed pain medication with alcoholic beverages.  Do not drive or operate machinery while taking any prescribed pain medication.    Activities  As instructed by your surgeon.  Limit your activities for 24 hours.  Avoid heavy work or sports until surgeon approves.  No lifting, pushing, pulling, straining until surgeon approves.  You may shower.    Surgery Area or Examination Site  After the cystoscopy, your urethra may be sore at first, and it may burn when you urinate for the first few days after the procedure.  You may feel the need to urinate more often, and your urine may be pink.   These symptoms should get better in 1 or 2 days.     Call your surgeon for the following:  You have pain that does not get better after you take pain medicine.   For an oral temperature (by mouth) is 101 degrees or higher, chills, or excessive sweating.  Persistent nausea or vomiting and can’t keep fluids down.  You have trouble passing urine or stool, especially if you have pain or swelling in your lower belly.   If you are unable to urinate within 8 hours of surgery.  Your urine is still red or you see blood clots after you have urinated several times.  Your urine gets cloudy or smells bad.  You have pain in

## 2025-04-22 NOTE — BRIEF OP NOTE
Brief Postoperative Note      Patient: Julien Batista  YOB: 1965  MRN: [id number]    Date of Procedure: 4/22/2025    Pre-Op Diagnosis Codes:      * Kidney stone [N20.0]    Post-Op Diagnosis: Same       Procedure(s):  CYSTOSCOPY URETEROSCOPY HOLMIUM LASER BILATERAL    Surgeon(s):  Jim Hightower MD    Assistant:  * No surgical staff found *    Anesthesia: General    Estimated Blood Loss (mL): Minimal    Complications: None    Specimens:   * No specimens in log *    Implants:  * No surgical log found *      Drains: * No LDAs found *    Findings:  Infection Present At Time Of Surgery (PATOS) (choose all levels that have infection present):  No infection present  Other Findings: d    Electronically signed by Jim Hightower MD on 4/22/2025 at 11:06 AM

## 2025-04-22 NOTE — ANESTHESIA PRE PROCEDURE
Department of Anesthesiology  Preprocedure Note       Name:  Julien Batista   Age:  60 y.o.  :  1965                                          MRN:  [id number]         Date:  2025      Surgeon: Surgeon(s):  Jim Hightower MD    Procedure: Procedure(s):  CYSTOSCOPY URETEROSCOPY HOLMIUM LASER BILATERAL    Medications prior to admission:   Prior to Admission medications    Medication Sig Start Date End Date Taking? Authorizing Provider   cephALEXin (KEFLEX) 500 MG capsule Take 1 capsule by mouth 3 times daily 25  Yes Jim Hightower MD   HYDROcodone-acetaminophen (NORCO) 5-325 MG per tablet Take 1 tablet by mouth every 4 hours as needed for Pain for up to 5 days. Intended supply: 3 days. Take lowest dose possible to manage pain Max Daily Amount: 6 tablets 25 Yes Jim Hightower MD   blood glucose test strips (TRUE METRIX BLOOD GLUCOSE TEST) strip USE 1 STRIP TO CHECK GLUCOSE ONCE DAILY 25   Yared Mukherjee MD   naproxen (NAPROSYN) 500 MG tablet Take 1 tablet by mouth as needed for Pain (for migraines)    ProviderJulian MD   ibuprofen (ADVIL;MOTRIN) 800 MG tablet Take 1 tablet by mouth every 8 hours as needed for Pain 4/10/25   Afshin Chan MD   rizatriptan (MAXALT-MLT) 10 MG disintegrating tablet Take 1 tablet by mouth once as needed for Migraine May repeat in 2 hours if needed 3/19/25   Rosa Elena Watt MD   amLODIPine (NORVASC) 10 MG tablet Take 1 tablet by mouth daily 25   Cesar Yoo MD   DULoxetine (CYMBALTA) 30 MG extended release capsule Take 1 capsule by mouth daily 25   Cesar Yoo MD   atorvastatin (LIPITOR) 10 MG tablet Take 1 tablet by mouth daily 25   Cesar Yoo MD   MAGNESIUM-OXIDE 400 (240 Mg) MG tablet Take 1 tablet by mouth daily 25   ProviderJulian MD   albuterol sulfate HFA (PROVENTIL;VENTOLIN;PROAIR) 108 (90 Base) MCG/ACT inhaler Inhale 2 puffs into the lungs every 4 hours as needed for Wheezing or Shortness of

## 2025-04-23 ENCOUNTER — OFFICE VISIT (OUTPATIENT)
Age: 60
End: 2025-04-23

## 2025-04-23 VITALS — WEIGHT: 235 LBS | BODY MASS INDEX: 28.62 KG/M2 | HEIGHT: 76 IN

## 2025-04-23 DIAGNOSIS — S83.282A OTHER TEAR OF LATERAL MENISCUS OF LEFT KNEE, UNSPECIFIED WHETHER OLD OR CURRENT TEAR, INITIAL ENCOUNTER: ICD-10-CM

## 2025-04-23 DIAGNOSIS — Z48.89 POSTOPERATIVE VISIT: Primary | ICD-10-CM

## 2025-04-23 PROCEDURE — 99024 POSTOP FOLLOW-UP VISIT: CPT

## 2025-04-23 NOTE — PROGRESS NOTES
[facility] Orthopedics & Sports Medicine    [facility] Orthopaed[facility] and Sports Medicine  [address] [place] [address], [address]  [place] [address]    Surgery:    4/10/2025  Diagnostic left knee arthroscopy with debridement and synovectomy    History of Present Illness:    This is a 60 y.o. male who presents to the clinic today for post op follow up for diagnostic left knee arthroscopy with debridement and synovectomy on 4/10/2025.  Patient states he is doing okay postoperative.  He does have soreness into his quad and hamstring muscles.  He is been taking Percocet for pain.  He presents today for further evaluation and treatment.    Physical Exam:  Left knee: Patient is able to fully extend on exam today.  He has flexion back to me about 90 degrees.  His incision sites.  Clean, dry, intact.  There is no seepage or drainage coming from the incision site.  No signs infectious process ongoing.  No large palpable effusion on exam today.    Imaging:  None    Plan:  Patient is doing well.  At this point I do want him to start getting into some physical therapy to help work on his range of motion and strength.  I did talk with him about ice and heat over the area.  I also encouraged him to transition to Tylenol and ibuprofen.  Certainly if he is any question concerns, he can reach out to the office.  The patient demonstrates understanding and is agreeable to plan.         Electronically signed by JOVITA JOSHUA PA-C on 4/23/2025 at 1:07 PM    Please note that this chart was generated using voice recognition Dragon dictation software. Although every effort was made to ensure the accuracy of this automated transcription, some errors in transcription may have occurred.

## 2025-04-23 NOTE — OP NOTE
[place] [address] [place] [address]                            OPERATIVE REPORT      PATIENT NAME: SHARDA UPTON              : 1965  MED REC NO: [id number]                          ROOM: [facility]  ACCOUNT NO: [id number]                       ADMIT DATE: 2025  PROVIDER: Jim Hightower MD      DATE OF PROCEDURE:  2025    SURGEON:  Jim Hightower MD    ASSISTANT:  None.    PREOPERATIVE DIAGNOSIS:  Bilateral ureteral stones.    POSTOPERATIVE DIAGNOSIS:  Bilateral ureteral stones.    PROCEDURES:  Cystoscopy, bilateral ureteroscopy, bilateral holmium laser lithotripsy, bilateral stent placement.    ANESTHESIA:  General.    COMPLICATIONS:  None.    ESTIMATED BLOOD LOSS:  Minimal.    INDICATIONS FOR PROCEDURE:  This is a 60-year-old white male who presented with bilateral ureteral stones, thus the above procedure was scheduled.  The risks and benefits were explained to the patient.  Informed consent was obtained.    DESCRIPTION OF PROCEDURE:  This 60-year-old white male was brought back to the operating room, positioned in the modified dorsal lithotomy position.  He was sterilely prepped and draped in standard fashion in the modified dorsal lithotomy position.  A 22-Tajik rigid cystoscope was inserted into his urethra and advanced into his bladder.  Bladder was examined.  No tumors or stones were noted.  Both ureteral orifices were normal.  I advanced a wire into the right ureter.  I used a dual-lumen ureteral catheter, placed a second wire, and advanced the rigid ureteroscope into the bladder and into the ureter, and I quickly found the stone in the mid ureter.  I used a 200 micron laser fiber to fragment the stone into very tiny pieces.  After this was done, I examined the rest of the ureter.  No other stones were identified.  I removed the ureteroscope.  I backloaded the cystoscope onto the wire and I advanced a 5-Tajik 28 cm

## 2025-04-24 ENCOUNTER — HOSPITAL ENCOUNTER (OUTPATIENT)
Dept: PHYSICAL THERAPY | Age: 60
Setting detail: THERAPIES SERIES
Discharge: HOME | End: 2025-04-24
Payer: MEDICAID

## 2025-04-24 DIAGNOSIS — N20.0 KIDNEY STONE: Primary | ICD-10-CM

## 2025-04-24 PROCEDURE — 97162 PT EVAL MOD COMPLEX 30 MIN: CPT

## 2025-04-24 PROCEDURE — 97110 THERAPEUTIC EXERCISES: CPT

## 2025-04-24 NOTE — THERAPY EVALUATION
[facility]   Outpatient Rehabilitation & Therapy  [address]         Phone: [phone]       Fax: [phone]    Physical Therapy Lower Extremity Evaluation    Date:  2025  Patient: Julien Batista  : 1965  MRN: [id number]  Physician: Temitope Jacques PA-C    Insurance: [employer] - 30 visits/30 visits remaining (authorization after 30 th visit)  Medical Diagnosis: Other tear of lateral meniscus of left knee, unspecified whether old or current tear, initial encounter (S83.282A)   Clinical Diagnosis:   Onset date: 04/10/2025  Next 's appt.: TBD  Visit Count:    Cancel/No Show: 0/0    Subjective:   CC: (L) knee pain  HPI: (04/10/2025) Patient is 60 year old male who present with (L) knee pain due to undergoing a left knee arthroscopy with debridement and synovectomy on 04/10/2025 @ [facility]. Discharged the same day to home. Recently, followed up with his surgeon's office. Based on the clinical findings, an outpatient referral was provided to further address the physical impairments and activity limitations.     Past Medical History:   Diagnosis Date    Arthritis     left knee, and bilateral hands    Asthma     Atrial fibrillation (HCC)     Back pain     Fibromyalgia     H/O cardiac radiofrequency ablation January and 2015    Headache(784.0)     Hypertension     Kidney stones     PVC (premature ventricular contraction)     Restless legs syndrome 2015    Sleep apnea     no C-pap at this time.    SVT (supraventricular tachycardia)     Vertigo       Past Surgical History:   Procedure Laterality Date    ABLATION OF DYSRHYTHMIC FOCUS  2016    pvc's /  DR BUENO    ANKLE SURGERY Right     wart removed    CARDIAC SURGERY  2015    SVT ablation x2    CARPAL TUNNEL RELEASE Right 2018    CAUTERY OF TURBINATES      CHOLECYSTECTOMY      COLONOSCOPY N/A 2025    COLONOSCOPY POLYPECTOMY SNARE/BIOPSY

## 2025-04-28 ENCOUNTER — RESULTS FOLLOW-UP (OUTPATIENT)
Dept: INTERNAL MEDICINE CLINIC | Age: 60
End: 2025-04-28

## 2025-04-28 DIAGNOSIS — G47.30 SLEEP APNEA, UNSPECIFIED TYPE: Primary | ICD-10-CM

## 2025-04-30 NOTE — TELEPHONE ENCOUNTER
Patient came into the office today requesting results. Notified about sleep apnea, states he does not see PCP until end of June and did not do any sleep titration study. Unsure of next steps please advise

## 2025-05-01 ENCOUNTER — HOSPITAL ENCOUNTER (OUTPATIENT)
Dept: PHYSICAL THERAPY | Age: 60
Setting detail: THERAPIES SERIES
Discharge: HOME | End: 2025-05-01
Payer: MEDICAID

## 2025-05-01 PROCEDURE — 97110 THERAPEUTIC EXERCISES: CPT

## 2025-05-01 NOTE — FLOWSHEET NOTE
[] [facility]  Outpatient Rehabilitation &  Therapy  [address]  [phone]  [phone] [] [facility]  Outpatient Rehabilitation &  Therapy  [address]  P: [phone]  F: [phone] [] [facility]  Outpatient Rehabilitation &  Therapy  [address]  P: [phone]  F: [phone] [] [facility]  Outpatient Rehabilitation &  Therapy  [address]  [phone]  [phone] [] [facility]  Outpatient Rehabilitation &  Therapy  [address]   P: [phone]  F: [phone]  [] [facility]  Outpatient Rehabilitation &  Therapy  [address]  P: [phone]  F: [phone] [] [facility]  Outpatient Rehabilitation &  Therapy  [address]  P: [phone]  F: [phone] [] [facility]  Outpatient Rehabilitation &  Therapy  [address]  P: [phone]  F: [phone] [] [facility]  Outpatient Rehabilitation &  Therapy  [address]  P: [phone]  F: [phone]  [x] [facility] Outpatient Rehabilitation &  Therapy  [address]  P: [phone]  F: [phone]     Physical Therapy Daily Treatment Note    Date:  2025  Patient Name:  Julien Batista    :  1965  MRN: [id number]  Physician: Temitope Jacques PA-C                  Insurance: [employer] - 30 visits/30 visits remaining (authorization after 30 th visit)  Medical Diagnosis: Other tear of lateral meniscus of left knee, unspecified whether old or current tear, initial encounter (X92.724A)   Clinical Diagnosis: (L) knee pain (M25.562) with decreased range of motion (M25.662)   Onset date: 04/10/2025                     Next 's appt.: TBD  Visit Count:                                 Cancel/No Show: 0/0    Subjective:    Pain:  []

## 2025-05-05 ENCOUNTER — HOSPITAL ENCOUNTER (OUTPATIENT)
Dept: PHYSICAL THERAPY | Age: 60
Setting detail: THERAPIES SERIES
Discharge: HOME | End: 2025-05-05
Payer: MEDICAID

## 2025-05-05 PROCEDURE — 97110 THERAPEUTIC EXERCISES: CPT

## 2025-05-05 RX ORDER — CALCIUM CITRATE/VITAMIN D3 200MG-6.25
TABLET ORAL
Qty: 100 EACH | Refills: 0 | Status: SHIPPED | OUTPATIENT
Start: 2025-05-05

## 2025-05-05 RX ORDER — GLUCOSAM/CHON-MSM1/C/MANG/BOSW 500-416.6
TABLET ORAL
Qty: 100 EACH | Refills: 0 | Status: SHIPPED | OUTPATIENT
Start: 2025-05-05

## 2025-05-05 NOTE — FLOWSHEET NOTE
limitations that are present.        Goals  MET NOT MET ON-  GOING  Details   STG: To be met in 6 treatments            1. ? Pain: Decrease pain levels to 0-7/10 when completing his desired ADLs.  []  []  []      2. ? ROM: Patient will demonstrate improved ROM by 10 degrees in all involved motions to assist with function. []  []  []      3. Demonstrate knowledge of fall risk prevention  []  []  []      LTG: To be met in 12 treatments           1. Improve score on functional assessment tool LEFS by 10 points.   []  []  []      2. Reduce pain levels to 0-5/10 or less when completing his desired ADLs []  []  []      3. Patient will demonstrate independence with his home exercise program at discharge. []  []  []      4. ? ROM: Patient will demonstrate full range of motion within all involved motions to assist with function. []  []  []      5. Patient will demonstrate an improved gait pattern with an assistive device.  []  []  []          Pt. Education:  [x] Yes  [] No  [x] Reviewed Prior HEP/Ed  Method of Education: [x] Verbal  [x] Demo  [] Written  Comprehension of Education:  [] Verbalizes understanding.  [] Demonstrates understanding.  [] Needs review.  [] Demonstrates/verbalizes HEP/Ed previously given.       Access Code: [email or web address]  URL: [email or web address]  Date: 05/01/2025  Prepared by: Columba Rojo    Exercises  - Long Sitting Quad Set with Towel Roll Under Heel  - 1 x daily - 7 x weekly - 3 sets - 10 reps  - Supine Heel Slide with Strap  - 1 x daily - 7 x weekly - 3 sets - 10 reps  - Supine Hamstring Stretch with Strap  - 1 x daily - 7 x weekly - 3 sets - 10 reps  - Seated Long Arc Quad  - 1 x daily - 7 x weekly - 3 sets - 10 reps  - Standing Terminal Knee Extension at Wall with Ball  - 1 x daily - 7 x weekly - 3 sets - 10 reps  - Standing Knee Flexion AROM with Chair Support  - 1 x daily - 7 x weekly - 3 sets - 10 reps  - Seated Hamstring Curl with Anchored Resistance  - 1 x daily - 7 x weekly - 3 sets

## 2025-05-06 ENCOUNTER — OFFICE VISIT (OUTPATIENT)
Dept: PODIATRY | Age: 60
End: 2025-05-06

## 2025-05-06 VITALS — HEIGHT: 76 IN | BODY MASS INDEX: 29.1 KG/M2 | WEIGHT: 239 LBS

## 2025-05-06 DIAGNOSIS — M19.071 PRIMARY OSTEOARTHRITIS OF RIGHT FOOT: ICD-10-CM

## 2025-05-06 DIAGNOSIS — M79.671 PAIN IN RIGHT FOOT: ICD-10-CM

## 2025-05-06 DIAGNOSIS — M20.5X1 HALLUX LIMITUS OF RIGHT FOOT: Primary | ICD-10-CM

## 2025-05-06 RX ORDER — BUPIVACAINE HYDROCHLORIDE 5 MG/ML
5 INJECTION, SOLUTION PERINEURAL ONCE
Status: COMPLETED | OUTPATIENT
Start: 2025-05-06 | End: 2025-05-06

## 2025-05-06 RX ORDER — DEXAMETHASONE SODIUM PHOSPHATE 4 MG/ML
4 INJECTION, SOLUTION INTRA-ARTICULAR; INTRALESIONAL; INTRAMUSCULAR; INTRAVENOUS; SOFT TISSUE ONCE
Status: COMPLETED | OUTPATIENT
Start: 2025-05-06 | End: 2025-05-06

## 2025-05-06 RX ADMIN — DEXAMETHASONE SODIUM PHOSPHATE 4 MG: 4 INJECTION, SOLUTION INTRA-ARTICULAR; INTRALESIONAL; INTRAMUSCULAR; INTRAVENOUS; SOFT TISSUE at 15:21

## 2025-05-06 RX ADMIN — BUPIVACAINE HYDROCHLORIDE 5 MG: 5 INJECTION, SOLUTION PERINEURAL at 15:21

## 2025-05-06 ASSESSMENT — ENCOUNTER SYMPTOMS
COLOR CHANGE: 0
SHORTNESS OF BREATH: 0
NAUSEA: 0
DIARRHEA: 0
BACK PAIN: 0

## 2025-05-06 NOTE — PROGRESS NOTES
Julien Batista is a 60 y.o. male who presents to the office today with chief complaint of pain to the right foot.  Chief Complaint   Patient presents with    Foot Pain     Right foot pain for about 4 months/no injury     Other     Xrays in Epic   Symptoms began about 4 month(s) ago. Patient denies injury to the right foot. Patient states that the pain is at the big toe joint and behind it. Patient states that the pain is greatest when standing and walking and when getting out of bed in the morning. Pain is rated 6 out of 10 at it's worst and is described as intermittent. Treatments prior to today's visit include: Patient had xrays taken, but he was not told the results of these. Patient denies any treatment.      Allergies   Allergen Reactions    Seasonal Itching, Dermatitis and Rash       Past Medical History:   Diagnosis Date    Arthritis     left knee, and bilateral hands    Asthma     Atrial fibrillation (HCC)     Back pain     Fibromyalgia 2015    H/O cardiac radiofrequency ablation January and March 2015    Headache(784.0)     Hypertension     Kidney stones     PVC (premature ventricular contraction)     Restless legs syndrome 2015    Sleep apnea     no C-pap at this time.    SVT (supraventricular tachycardia)     Vertigo        Prior to Admission medications    Medication Sig Start Date End Date Taking? Authorizing Provider   TRUE METRIX BLOOD GLUCOSE TEST strip USE 1 STRIP TO CHECK GLUCOSE ONCE DAILY 5/5/25  Yes Cesar Yoo MD   TRUEplus Lancets 28G MISC USE 1  TO CHECK GLUCOSE ONCE DAILY 5/5/25  Yes Cesar Yoo MD   cephALEXin (KEFLEX) 500 MG capsule Take 1 capsule by mouth 3 times daily  Patient not taking: Reported on 5/7/2025 4/22/25  Yes Jim Hightower MD   naproxen (NAPROSYN) 500 MG tablet Take 1 tablet by mouth as needed for Pain (for migraines)   Yes Provider, MD Julian   ibuprofen (ADVIL;MOTRIN) 800 MG tablet Take 1 tablet by mouth every 8 hours as needed for Pain 4/10/25

## 2025-05-07 ENCOUNTER — OFFICE VISIT (OUTPATIENT)
Dept: INTERNAL MEDICINE CLINIC | Age: 60
End: 2025-05-07
Payer: MEDICAID

## 2025-05-07 VITALS
BODY MASS INDEX: 29.35 KG/M2 | WEIGHT: 241 LBS | SYSTOLIC BLOOD PRESSURE: 118 MMHG | HEART RATE: 75 BPM | DIASTOLIC BLOOD PRESSURE: 82 MMHG | OXYGEN SATURATION: 97 % | HEIGHT: 76 IN

## 2025-05-07 DIAGNOSIS — R42 VERTIGO: ICD-10-CM

## 2025-05-07 DIAGNOSIS — I10 ESSENTIAL HYPERTENSION: Primary | ICD-10-CM

## 2025-05-07 DIAGNOSIS — R51.9 NONINTRACTABLE HEADACHE, UNSPECIFIED CHRONICITY PATTERN, UNSPECIFIED HEADACHE TYPE: ICD-10-CM

## 2025-05-07 PROCEDURE — 3074F SYST BP LT 130 MM HG: CPT

## 2025-05-07 PROCEDURE — 3079F DIAST BP 80-89 MM HG: CPT

## 2025-05-07 PROCEDURE — 99214 OFFICE O/P EST MOD 30 MIN: CPT

## 2025-05-07 ASSESSMENT — ENCOUNTER SYMPTOMS
ABDOMINAL PAIN: 0
DIARRHEA: 0
WHEEZING: 0
VOMITING: 0
CONSTIPATION: 0
ALLERGIC/IMMUNOLOGIC NEGATIVE: 1
NAUSEA: 0
COUGH: 0
BACK PAIN: 0
SHORTNESS OF BREATH: 0

## 2025-05-07 NOTE — PROGRESS NOTES
[facility]  [address] [address]  Dept: [phone]  Dept Fax: [phone]    OFFICE VISIT NOTE  Date of patient's visit: 5/7/2025  Patient's Name:  Julien Batista YOB: 1965            Patient Care Team:  Cesar Yoo MD as PCP - General (Internal Medicine)  Cesar Yoo MD as PCP - Empaneled Provider  Jim Hightower MD as Consulting Physician (Urology)  _________________________________________    ________________________________________________  Chief Complaint:   Post-Op Check (Cystoscopy 4/23/Colonoscopy 3/18/Would like to review results ) and Dizziness (Has tried multiple medications with no relief )    _______________________________________________  History of Presenting Illness:  History of Present Illness  The patient presents for evaluation of vertigo, hypertension, and hyperlipidemia.    He continues to experience persistent vertigo, characterized by infrequent episodes of spinning sensation and a constant lack of equilibrium. Despite consultations with two ENT specialists, Dr. Martinez and Dr. Valiente, and subsequent referrals to therapy, his symptoms have not improved. He reports daily dizziness, with occasional severe head spins triggered by bending over or rapid head movements. Chronic imbalance necessitates the use of a cane or grocery cart for support during ambulation. He is under the care of a neurologist.     His blood pressure is well-managed with his current medication regimen. He is actively working on dietary modifications, including reducing his intake of salt, sugar, and carbohydrates. His diet includes lean proteins such as turkey and fish, and he is making efforts to avoid high-fat foods.    He underwent a colonoscopy in 03/2025, which revealed two precancerous polyps in the cecum and sigmoid colon. A repeat colonoscopy is recommended in three years.    He is currently undergoing physical therapy following a

## 2025-05-08 ENCOUNTER — HOSPITAL ENCOUNTER (OUTPATIENT)
Dept: PHYSICAL THERAPY | Age: 60
Setting detail: THERAPIES SERIES
Discharge: HOME | End: 2025-05-08
Payer: MEDICAID

## 2025-05-08 PROCEDURE — 97116 GAIT TRAINING THERAPY: CPT

## 2025-05-08 PROCEDURE — 97110 THERAPEUTIC EXERCISES: CPT

## 2025-05-08 NOTE — FLOWSHEET NOTE
[] [facility]  Outpatient Rehabilitation &  Therapy  [address]  [phone]  [phone] [] [facility]  Outpatient Rehabilitation &  Therapy  [address]  P: [phone]  F: [phone] [] [facility]  Outpatient Rehabilitation &  Therapy  [address]  P: [phone]  F: [phone] [] [facility]  Outpatient Rehabilitation &  Therapy  [address]  [phone]  [phone] [] [facility]  Outpatient Rehabilitation &  Therapy  [address]   P: [phone]  F: [phone]  [] [facility]  Outpatient Rehabilitation &  Therapy  [address]  P: [phone]  F: [phone] [] [facility]  Outpatient Rehabilitation &  Therapy  [address]  P: [phone]  F: [phone] [] [facility]  Outpatient Rehabilitation &  Therapy  [address]  P: [phone]  F: [phone] [] [facility]  Outpatient Rehabilitation &  Therapy  [address]  P: [phone]  F: [phone]  [x] [facility] Outpatient Rehabilitation &  Therapy  [address]  P: [phone]  F: [phone]     Physical Therapy Daily Treatment Note    Date:  2025  Patient Name:  Julien Batista    :  1965  MRN: [id number]  Physician: Temitope Jacques PA-C                  Insurance: [employer] - 30 visits/30 visits remaining (authorization after 30 th visit)  Medical Diagnosis: Other tear of lateral meniscus of left knee, unspecified whether old or current tear, initial encounter (E31.055A)   Clinical Diagnosis: (L) knee pain (M25.562) with decreased range of motion (M25.662)   Onset date: 04/10/2025                     Next 's appt.: TBD  Visit Count:                                 Cancel/No Show: 0/0    Subjective:    Pain:  []

## 2025-05-13 ENCOUNTER — HOSPITAL ENCOUNTER (OUTPATIENT)
Dept: PHYSICAL THERAPY | Age: 60
Setting detail: THERAPIES SERIES
Discharge: HOME | End: 2025-05-13
Payer: MEDICAID

## 2025-05-13 PROCEDURE — 97110 THERAPEUTIC EXERCISES: CPT

## 2025-05-13 NOTE — THERAPY DISCHARGE
[facility]   Outpatient Rehabilitation & Therapy  [address]         Phone: [phone]       Fax: [phone]    Physical Therapy Progress Note Update/Discharge Summary     Date:  2025  Patient: Julien Batista  : 1965  MRN: [id number]  Physician: Temitope Jacques PA-C    Insurance: [employer] - 30 visits/30 visits remaining (authorization after 30 th visit)  Medical Diagnosis: Other tear of lateral meniscus of left knee, unspecified whether old or current tear, initial encounter (S83.282A)   Clinical Diagnosis: (L) knee pain (M25.562) with decreased range of motion (M25.662)   Onset date: 04/10/2025  Next Dr's appt.: TBD  Visit Count:    Cancel/No Show: 0/0    Subjective:   Patient stated that the treatment sessions have helped. He stated that he has been exercising regularly at home. He felt all his needs have been met with the exception of descending steps but has been practicing on his full flight of steps at home.     ADL/IADL Previous level of function Current level of function Who currently assists the patient with task   Bathing  [x] Independent  [] Assist [x] Independent  [] Assist    Dress/grooming [x] Independent  [] Assist [x] Independent  [] Assist    Transfer/mobility [x] Independent  [] Assist [x] Independent  [] Assist    Feeding [x] Independent  [] Assist [x] Independent  [] Assist    Toileting [x] Independent  [] Assist [x] Independent  [] Assist    Driving [] Independent  [] Assist [] Independent  [] Assist Unable to drive due to vertigo    Housekeeping [x] Independent  [] Assist [x] Independent  [] Assist    Grocery shop/meal prep [x] Independent  [] Assist [x] Independent  [] Assist      Gait Prior level of function Current level of function    [x] Independent  [] Assist [x] Independent  [] Assist   Device: [] Independent [] Independent    [x] Straight Cane [] Quad cane [x] Straight Cane [] Quad cane    [] Standard walker []

## 2025-05-14 ENCOUNTER — HOSPITAL ENCOUNTER (OUTPATIENT)
Dept: SLEEP CENTER | Age: 60
Discharge: HOME | End: 2025-05-16
Payer: MEDICAID

## 2025-05-14 VITALS
BODY MASS INDEX: 29.35 KG/M2 | HEART RATE: 66 BPM | HEIGHT: 76 IN | WEIGHT: 241 LBS | RESPIRATION RATE: 17 BRPM | OXYGEN SATURATION: 94 %

## 2025-05-14 DIAGNOSIS — G47.30 SLEEP APNEA, UNSPECIFIED TYPE: ICD-10-CM

## 2025-05-14 PROCEDURE — 95811 POLYSOM 6/>YRS CPAP 4/> PARM: CPT

## 2025-05-15 ENCOUNTER — APPOINTMENT (OUTPATIENT)
Dept: PHYSICAL THERAPY | Age: 60
End: 2025-05-15
Payer: MEDICAID

## 2025-05-16 ENCOUNTER — TELEPHONE (OUTPATIENT)
Dept: INTERNAL MEDICINE CLINIC | Age: 60
End: 2025-05-16

## 2025-05-16 DIAGNOSIS — J30.2 SEASONAL ALLERGIES: Primary | ICD-10-CM

## 2025-05-16 RX ORDER — MONTELUKAST SODIUM 10 MG/1
10 TABLET ORAL DAILY
Qty: 30 TABLET | Refills: 3 | Status: SHIPPED | OUTPATIENT
Start: 2025-05-16

## 2025-05-16 NOTE — TELEPHONE ENCOUNTER
Patient is finding himself more short of breath lately especially during allergy season, inhaler seems not to be helping as much anymore.    Patient is requesting to be referred to an Allergist

## 2025-05-16 NOTE — TELEPHONE ENCOUNTER
Pls pend referral.   Pls start taking singulair, Order placed.   If continues to have SOB recommend going to emergency department. Any other symptoms associated with it?

## 2025-05-21 ENCOUNTER — OFFICE VISIT (OUTPATIENT)
Age: 60
End: 2025-05-21

## 2025-05-21 VITALS — HEIGHT: 76 IN | WEIGHT: 241 LBS | BODY MASS INDEX: 29.35 KG/M2

## 2025-05-21 DIAGNOSIS — Z48.89 POSTOPERATIVE VISIT: Primary | ICD-10-CM

## 2025-05-21 PROCEDURE — 99024 POSTOP FOLLOW-UP VISIT: CPT | Performed by: ORTHOPAEDIC SURGERY

## 2025-05-21 NOTE — PROGRESS NOTES
Never true     Ran Out of Food in the Last Year: Never true   Transportation Needs: No Transportation Needs (1/15/2025)    PRAPARE - Transportation     Lack of Transportation (Medical): No     Lack of Transportation (Non-Medical): No   Physical Activity: Not on file   Stress: Not on file   Social Connections: Not on file   Intimate Partner Violence: Not on file   Housing Stability: High Risk (1/15/2025)    Housing Stability Vital Sign     Unable to Pay for Housing in the Last Year: Yes     Number of Times Moved in the Last Year: 0     Homeless in the Last Year: No     Past Medical History:   Diagnosis Date    Arthritis     left knee, and bilateral hands    Asthma     Atrial fibrillation (HCC)     Back pain     Fibromyalgia 2015    H/O cardiac radiofrequency ablation January and March 2015    Headache(784.0)     Hypertension     Kidney stones     PVC (premature ventricular contraction)     Restless legs syndrome 2015    Sleep apnea     no C-pap at this time.    SVT (supraventricular tachycardia)     Vertigo      Past Surgical History:   Procedure Laterality Date    ABLATION OF DYSRHYTHMIC FOCUS  08/06/2016    pvc's /  DR BUENO    ANKLE SURGERY Right     wart removed    CARDIAC SURGERY  01/26/2015    SVT ablation x2    CARPAL TUNNEL RELEASE Right 08/2018    CAUTERY OF TURBINATES      CHOLECYSTECTOMY      COLONOSCOPY N/A 03/18/2025    COLONOSCOPY POLYPECTOMY SNARE/BIOPSY performed by Chelsea Brooks MD at [facility] ENDO    CYSTOSCOPY Bilateral 4/22/2025    CYSTOSCOPY URETEROSCOPY HOLMIUM LASER BILATERAL WITH BILATERAL STENT PLACEMENT performed by Jim Hightower MD at [facility] OR    KNEE ARTHROSCOPY Left 04/10/2025    KNEE ARTHROSCOPY Left 04/10/2025    LEFT KNEE ARTHROSCOPY DIAGNOSTIC WITH DEBRIDEMENT AND SYNOVECTOMY performed by Afshin Chan MD at [facility] OR    KNEE SURGERY Left     scraped arthritis off    ROTATOR CUFF REPAIR Left 1990    TONSILLECTOMY      adnoids    URETER SURGERY Left 09/28/2022    HOLMIUM,

## 2025-05-23 ENCOUNTER — TELEPHONE (OUTPATIENT)
Dept: INTERNAL MEDICINE CLINIC | Age: 60
End: 2025-05-23

## 2025-05-28 ENCOUNTER — RESULTS FOLLOW-UP (OUTPATIENT)
Dept: INTERNAL MEDICINE CLINIC | Age: 60
End: 2025-05-28

## 2025-06-03 ENCOUNTER — TELEPHONE (OUTPATIENT)
Dept: INTERNAL MEDICINE CLINIC | Age: 60
End: 2025-06-03

## 2025-06-03 ENCOUNTER — OFFICE VISIT (OUTPATIENT)
Dept: FAMILY MEDICINE CLINIC | Age: 60
End: 2025-06-03
Payer: MEDICAID

## 2025-06-03 VITALS
DIASTOLIC BLOOD PRESSURE: 86 MMHG | SYSTOLIC BLOOD PRESSURE: 135 MMHG | TEMPERATURE: 97.3 F | OXYGEN SATURATION: 95 % | HEART RATE: 83 BPM

## 2025-06-03 DIAGNOSIS — M25.512 ACUTE PAIN OF LEFT SHOULDER: Primary | ICD-10-CM

## 2025-06-03 PROCEDURE — 3079F DIAST BP 80-89 MM HG: CPT

## 2025-06-03 PROCEDURE — 99213 OFFICE O/P EST LOW 20 MIN: CPT

## 2025-06-03 PROCEDURE — 3075F SYST BP GE 130 - 139MM HG: CPT

## 2025-06-03 RX ORDER — TIZANIDINE 2 MG/1
2 TABLET ORAL 3 TIMES DAILY PRN
Qty: 30 TABLET | Refills: 0 | Status: SHIPPED | OUTPATIENT
Start: 2025-06-03 | End: 2025-06-13

## 2025-06-03 RX ORDER — METHYLPREDNISOLONE 4 MG/1
TABLET ORAL
Qty: 1 KIT | Refills: 0 | Status: SHIPPED | OUTPATIENT
Start: 2025-06-03 | End: 2025-06-09

## 2025-06-03 ASSESSMENT — ENCOUNTER SYMPTOMS
EYE PAIN: 0
EYE ITCHING: 0
CHEST TIGHTNESS: 0
BACK PAIN: 0
EYE REDNESS: 0
SHORTNESS OF BREATH: 0

## 2025-06-03 NOTE — PROGRESS NOTES
[facility] WALK-IN FAMILY MEDICINE  [address]  [address]  [facility] [address]  Dept: [phone]  Dept Fax: [phone]    Julien Batista is a 60 y.o. male who presents to the urgent care today for his medical conditions/complaints as notedbelow.  Julien Batista is c/o of Other (Woke up 5/31/25 with left shoulder pain, has a history of rotator cuff surgery on that same shoulder )      HPI:     Patient presents to the Walk In Clinic for evaluation of left shoulder pain, onset 3 days, NKI reported. Patient reports Hx of rotator cuff surgery on same shoulder back in 1991. PMHx includes back pain, arthritis, fibromyalgia. Patient follows with [facility] Orthopedic surgery    Patient Care Team:  Cesar Yoo MD as PCP - General (Internal Medicine)  Cesar Yoo MD as PCP - Empaneled Provider  Jim Hightower MD as Consulting Physician (Urology)      Shoulder Pain   The pain is present in the left shoulder. This is a new problem. Episode onset: in the past 3 days. There has been a history of trauma. The problem occurs constantly. The problem has been gradually worsening. The quality of the pain is described as aching. The pain is severe. Associated symptoms include joint locking, a limited range of motion and tingling. Pertinent negatives include no fever, inability to bear weight, itching, joint swelling, numbness or stiffness. The symptoms are aggravated by activity. He has tried NSAIDS for the symptoms. The treatment provided no relief. His past medical history is significant for osteoarthritis.       Past Medical History:   Diagnosis Date    Arthritis     left knee, and bilateral hands    Asthma     Atrial fibrillation (HCC)     Back pain     Fibromyalgia 2015    H/O cardiac radiofrequency ablation January and March 2015    Headache(784.0)     Hypertension     Kidney stones     PVC (premature ventricular contraction)     Restless legs

## 2025-06-03 NOTE — TELEPHONE ENCOUNTER
Left shoulder rotator cuff pain, requested schedule an appointment , advised to [facility] due to no availability to schedule in office anytime soon...

## 2025-06-09 ENCOUNTER — HOSPITAL ENCOUNTER (OUTPATIENT)
Dept: GENERAL RADIOLOGY | Facility: CLINIC | Age: 60
Discharge: HOME | End: 2025-06-11
Payer: MEDICAID

## 2025-06-09 DIAGNOSIS — M25.512 LEFT SHOULDER PAIN, UNSPECIFIED CHRONICITY: Primary | ICD-10-CM

## 2025-06-09 PROCEDURE — 73030 X-RAY EXAM OF SHOULDER: CPT

## 2025-06-10 ENCOUNTER — OFFICE VISIT (OUTPATIENT)
Age: 60
End: 2025-06-10

## 2025-06-10 VITALS — BODY MASS INDEX: 29.35 KG/M2 | WEIGHT: 241 LBS | HEIGHT: 76 IN

## 2025-06-10 DIAGNOSIS — M25.511 RIGHT SHOULDER PAIN, UNSPECIFIED CHRONICITY: Primary | ICD-10-CM

## 2025-06-10 DIAGNOSIS — M75.42 ROTATOR CUFF IMPINGEMENT SYNDROME, LEFT: Primary | ICD-10-CM

## 2025-06-10 RX ADMIN — METHYLPREDNISOLONE ACETATE 80 MG: 80 INJECTION, SUSPENSION INTRA-ARTICULAR; INTRALESIONAL; INTRAMUSCULAR; SOFT TISSUE at 09:34

## 2025-06-10 RX ADMIN — LIDOCAINE HYDROCHLORIDE 2 ML: 10 INJECTION, SOLUTION INFILTRATION; PERINEURAL at 09:34

## 2025-06-10 NOTE — PROGRESS NOTES
views left shoulder taken at outside facility were independently interpreted today which shows overall preserved joint spaces.  No roman appreciated.  No acute process.  There may be a little bit of rotator cuff calcification appreciated.       Orders Placed This Encounter   Procedures    Ambulatory referral to Physical Therapy     Referral Priority:   Routine     Referral Type:   Eval and Treat     Referral Reason:   Specialty Services Required     Requested Specialty:   Physical Therapy     Number of Visits Requested:   1       Assessment and Plan:  1. Rotator cuff impingement syndrome, left          This is a pleasant 60 y.o. male with left shoulder impingement versus cuff tear.  This point we discussed both physical therapy and injections in the subacromial space to see if this helps calm down his pain and overall help with the rotator cuff strengthening.  He was agreeable to this.  Consent was obtained. Risks are pain, infection, joint stiffness, and increased blood glucose. The area was prepped with an alcohol swab. I then injected 80 mg of Depo Medrol and lidocaine into the left subacromial bursa. A band-aid was placed over the injection site. The patient tolerated this well.  PT order sent into [place].  If his pain is not any better, in the future we can consider an MRI of the left shoulder in the future if he still continues to have pain despite the injection as well as physical therapy.  Patient demonstrates understanding and is agreeable to the plan.        Review of Systems   Constitutional: Negative for fever, chills, sweats.   Neurological: Negative numbness, or weakness.   Integumentary: Negative for rash, itching, laceration, or abrasion.   Musculoskeletal: Positive for Shoulder Pain (L Shoulder)           Past History:    Current Outpatient Medications:     tiZANidine (ZANAFLEX) 2 MG tablet, Take 1 tablet by mouth 3 times daily as needed (shoulder pain/spasm), Disp: 30 tablet, Rfl: 0    montelukast

## 2025-06-11 ENCOUNTER — HOSPITAL ENCOUNTER (OUTPATIENT)
Dept: GENERAL RADIOLOGY | Facility: CLINIC | Age: 60
Discharge: HOME | End: 2025-06-13
Payer: MEDICAID

## 2025-06-11 DIAGNOSIS — M25.511 RIGHT SHOULDER PAIN, UNSPECIFIED CHRONICITY: ICD-10-CM

## 2025-06-11 DIAGNOSIS — M25.512 LEFT SHOULDER PAIN, UNSPECIFIED CHRONICITY: ICD-10-CM

## 2025-06-11 PROCEDURE — 73030 X-RAY EXAM OF SHOULDER: CPT

## 2025-06-11 RX ORDER — METHYLPREDNISOLONE ACETATE 80 MG/ML
80 INJECTION, SUSPENSION INTRA-ARTICULAR; INTRALESIONAL; INTRAMUSCULAR; SOFT TISSUE ONCE
Status: COMPLETED | OUTPATIENT
Start: 2025-06-11 | End: 2025-06-10

## 2025-06-11 RX ORDER — LIDOCAINE HYDROCHLORIDE 10 MG/ML
2 INJECTION, SOLUTION INFILTRATION; PERINEURAL ONCE
Status: COMPLETED | OUTPATIENT
Start: 2025-06-11 | End: 2025-06-10

## 2025-06-18 ENCOUNTER — OFFICE VISIT (OUTPATIENT)
Age: 60
End: 2025-06-18

## 2025-06-18 VITALS — WEIGHT: 241 LBS | BODY MASS INDEX: 29.35 KG/M2 | HEIGHT: 76 IN

## 2025-06-18 DIAGNOSIS — M25.811 IMPINGEMENT OF RIGHT SHOULDER: ICD-10-CM

## 2025-06-18 DIAGNOSIS — Z98.890 S/P LEFT KNEE ARTHROSCOPY: ICD-10-CM

## 2025-06-18 DIAGNOSIS — M75.42 ROTATOR CUFF IMPINGEMENT SYNDROME, LEFT: ICD-10-CM

## 2025-06-18 DIAGNOSIS — M25.511 RIGHT SHOULDER PAIN, UNSPECIFIED CHRONICITY: Primary | ICD-10-CM
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
## 2025-06-19 ENCOUNTER — HOSPITAL ENCOUNTER (OUTPATIENT)
Dept: PHYSICAL THERAPY | Age: 60
Setting detail: THERAPIES SERIES
Discharge: HOME | End: 2025-06-19
Payer: MEDICAID

## 2025-06-19 DIAGNOSIS — M25.512 LEFT SHOULDER PAIN, UNSPECIFIED CHRONICITY: ICD-10-CM

## 2025-06-19 DIAGNOSIS — M25.811 IMPINGEMENT OF RIGHT SHOULDER: Primary | ICD-10-CM

## 2025-06-19 PROCEDURE — 97162 PT EVAL MOD COMPLEX 30 MIN: CPT

## 2025-06-19 PROCEDURE — 97112 NEUROMUSCULAR REEDUCATION: CPT
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
## 2025-06-26 ENCOUNTER — HOSPITAL ENCOUNTER (OUTPATIENT)
Dept: PHYSICAL THERAPY | Age: 60
Setting detail: THERAPIES SERIES
Discharge: HOME | End: 2025-06-26
Payer: MEDICAID

## 2025-06-26 PROCEDURE — 97110 THERAPEUTIC EXERCISES: CPT

## 2025-06-26 NOTE — FLOWSHEET NOTE
[] [facility]  Outpatient Rehabilitation &  Therapy  [address]  [phone]  [phone] [] [facility]  Outpatient Rehabilitation &  Therapy  [address]  P: [phone]  F: [phone] [] [facility]  Outpatient Rehabilitation &  Therapy  [address]  P: [phone]  F: [phone] [] [facility]  Outpatient Rehabilitation &  Therapy  [address]  [phone]  [phone] [] [facility]  Outpatient Rehabilitation &  Therapy  [address]   P: [phone]  F: [phone]  [] [facility]  Outpatient Rehabilitation &  Therapy  [address]  P: [phone]  F: [phone] [] [facility]  Outpatient Rehabilitation &  Therapy  [address]  P: [phone]  F: [phone] [] [facility]  Outpatient Rehabilitation &  Therapy  [address]  P: [phone]  F: [phone] [] [facility]  Outpatient Rehabilitation &  Therapy  [address]  P: [phone]  F: [phone]  [x] [facility] Outpatient Rehabilitation &  Therapy  [address]  P: [phone]  F: [phone]     Physical Therapy Daily Treatment Note    Date:  2025  Patient Name:  Julien Batista    :  1965  MRN: [id number]  Physician: Temitope Jacques PA-C                          Insurance: [employer] -  visits remain, auth after  -- Will need auth at 9th visit   Medical Diagnosis: M75.42 (ICD-10-CM) - Rotator cuff impingement syndrome, left   M25.511 (ICD-10-CM) - Right shoulder pain, unspecified chronicity   M25.811 (ICD-10-CM) - Impingement of right shoulder   Z98.890 (ICD-10-CM) - S/P left knee arthroscopy                 Rehab Codes: R25 pain , M25.60 stiffness, R53.1 weakness   Onset Date: 6/10/25- referral

## 2025-07-01 ENCOUNTER — HOSPITAL ENCOUNTER (OUTPATIENT)
Dept: PHYSICAL THERAPY | Age: 60
Setting detail: THERAPIES SERIES
Discharge: HOME | End: 2025-07-01
Payer: MEDICAID

## 2025-07-01 PROCEDURE — 97110 THERAPEUTIC EXERCISES: CPT

## 2025-07-01 NOTE — FLOWSHEET NOTE
[] [facility]  Outpatient Rehabilitation &  Therapy  [address]  [phone]  [phone] [] [facility]  Outpatient Rehabilitation &  Therapy  [address]  P: [phone]  F: [phone] [] [facility]  Outpatient Rehabilitation &  Therapy  [address]  P: [phone]  F: [phone] [] [facility]  Outpatient Rehabilitation &  Therapy  [address]  [phone]  [phone] [] [facility]  Outpatient Rehabilitation &  Therapy  [address]   P: [phone]  F: [phone]  [] [facility]  Outpatient Rehabilitation &  Therapy  [address]  P: [phone]  F: [phone] [] [facility]  Outpatient Rehabilitation &  Therapy  [address]  P: [phone]  F: [phone] [] [facility]  Outpatient Rehabilitation &  Therapy  [address]  P: [phone]  F: [phone] [] [facility]  Outpatient Rehabilitation &  Therapy  [address]  P: [phone]  F: [phone]  [x] [facility] Outpatient Rehabilitation &  Therapy  [address]  P: [phone]  F: [phone]     Physical Therapy Daily Treatment Note    Date:  2025  Patient Name:  Julien Batista    :  1965  MRN: [id number]  Physician: Temitope Jacques PA-C                          Insurance: [employer] -  visits remain, auth after  -- Will need auth at 9th visit   Medical Diagnosis: M75.42 (ICD-10-CM) - Rotator cuff impingement syndrome, left   M25.511 (ICD-10-CM) - Right shoulder pain, unspecified chronicity   M25.811 (ICD-10-CM) - Impingement of right shoulder   Z98.890 (ICD-10-CM) - S/P left knee arthroscopy                 Rehab Codes: R25 pain , M25.60 stiffness, R53.1 weakness   Onset Date: 6/10/25- referral

## 2025-07-03 ENCOUNTER — HOSPITAL ENCOUNTER (OUTPATIENT)
Dept: PHYSICAL THERAPY | Age: 60
Setting detail: THERAPIES SERIES
Discharge: HOME | End: 2025-07-03
Payer: MEDICAID

## 2025-07-03 PROCEDURE — 97110 THERAPEUTIC EXERCISES: CPT

## 2025-07-03 NOTE — FLOWSHEET NOTE
Verbalizes understanding.  [x] Demonstrates understanding.  [x] Needs review.  [] Demonstrates/verbalizes HEP/Ed previously given.     Patient Education/Home Program:   6/19: Access Code: [email or web address] -- Exercises  - Supine Scapular Protraction in Flexion with Dumbbells  - 1 x daily - 7 x weekly - 3 sets - 10 reps  - Supine Shoulder Circles with Weight  - 1 x daily - 7 x weekly - 3 sets - 10 reps  - Supine Shoulder Alphabet  - 1 x daily - 7 x weekly - 1 rep    Plan: [x] Continue current frequency toward long and short term goals.    [] Other:        Treatment Charges: Mins Units Time In/Out   []  Modalities        [x]  Ther Exercise 39 3    []  Neuromuscular Re-ed      []  Gait Training      []  Manual Therapy      []  Ther Activities      []  Aquatics      []  Vasocompression      []  Cervical Traction      []  Other      Total Billable time 39 3     Time In:  1434 pm             Time Out: 1513 pm    Electronically signed by:  Columba Rojo PTA

## 2025-07-08 ENCOUNTER — HOSPITAL ENCOUNTER (OUTPATIENT)
Dept: PHYSICAL THERAPY | Age: 60
Setting detail: THERAPIES SERIES
Discharge: HOME | End: 2025-07-08
Payer: MEDICAID

## 2025-07-08 PROCEDURE — 97110 THERAPEUTIC EXERCISES: CPT

## 2025-07-08 NOTE — FLOWSHEET NOTE
Next Dr.'s appt: TBD   Visit# / total visits: 5/18 (5/9 Auth)   Cancels/No Shows: 0/0    Subjective:    Pain:  [x] Yes  [] No Location: B shoulders    Pain Rating: (0-10 scale) 1-2/10 R shoulder, 2-3/10 L shoulder   Pain altered Tx:  [x] No  [] Yes  Action:  Comments: Pt reports increased pain in left shoulder when laying on it.  Denies any issues after last visit beside mild soreness.      Objective:  Modalities:   Precautions [] No  [x] Yes: asthma, Atrial fib, fibromyalgia, vertigo   Exercises:  INTERVENTIONS  Reps/ Time Weight/ Level Completed  Today Comments               MODALITIES                                    MANUAL                                    EXERCISES            Semi- reclined            Serratus punches  2x10 ea   4# x     Arm at 90 deg: circles  2x10 ea  2# x     Alphabet  1x ea  2# x     Side lying ER 10x2 2# x Added weight 7/8   Side lying abd 10x2 2# x    AAROM flexion 10x 2 #3 x Inc weight 7/8   Ball on wall CW/CCW, up/down, hor abd 20x MB 2 x 20x each direction (B) UE individually    Rows/extension 10x2 GReen x    Tball roll outs flx/abd/ hor add 10x      Tband ER/IR 2x10 reps Red/green x Red=ER; Green=IR   Bicep curl 3-ways 15x ea #4 x    Tband (B) ER  10x Red x HEP   Tband (B) Horiz abd 10x Red x HEP                 Other:  Specific Instructions for subsequent treatments:   - L shoulder IR/ER work  - B shoulder strengthening and stability  - TB work  - progress scapular stabilization and posterior shoulder strengthening      Assessment: [x] Progressing toward goals.  Progressed weights and reps listed above with emphasis on scapular stability and increased pain free ROM in left shoulder.  Increased weight with AAROM flexion for strengthening.  Added Tband (B) ER and horizontal abduction and given at home for HEP.  Notes mild fatigue in UEs but denies any increased pain with progressions made.      [] No change.     [] Other:   [x] Patient would continue to benefit from skilled

## 2025-07-10 ENCOUNTER — HOSPITAL ENCOUNTER (OUTPATIENT)
Dept: PHYSICAL THERAPY | Age: 60
Setting detail: THERAPIES SERIES
Discharge: HOME | End: 2025-07-10
Payer: MEDICAID

## 2025-07-10 PROCEDURE — 97110 THERAPEUTIC EXERCISES: CPT

## 2025-07-10 NOTE — FLOWSHEET NOTE
[email or web address]  Date: 07/08/2025  Prepared by: Isra Rand    Exercises  - Supine Scapular Protraction in Flexion with Dumbbells  - 1 x daily - 7 x weekly - 3 sets - 10 reps  - Supine Shoulder Circles with Weight  - 1 x daily - 7 x weekly - 3 sets - 10 reps  - Supine Shoulder Alphabet  - 1 x daily - 7 x weekly - 1 reps  - Shoulder External Rotation and Scapular Retraction with Resistance  - 1 x daily - 7 x weekly - 2 sets - 10 reps  - Standing Shoulder Horizontal Abduction with Resistance  - 1 x daily - 7 x weekly - 2 sets - 10 reps  - Shoulder Internal Rotation with Resistance  - 1 x daily - 7 x weekly - 3 sets - 10 reps  - Shoulder External Rotation with Anchored Resistance  - 1 x daily - 7 x weekly - 3 sets - 10 reps    Plan: [x] Continue current frequency toward long and short term goals.    [] Other:        Treatment Charges: Mins Units Time In/Out   []  Modalities        [x]  Ther Exercise 42 3    []  Neuromuscular Re-ed      []  Gait Training      []  Manual Therapy      []  Ther Activities      []  Aquatics      []  Vasocompression      []  Cervical Traction      []  Other      Total Billable time 42 3     Time In:  1300             Time Out: 1342    Electronically signed by:  Columba Rojo PTA

## 2025-07-11 ENCOUNTER — HOSPITAL ENCOUNTER (OUTPATIENT)
Dept: MRI IMAGING | Age: 60
Discharge: HOME | End: 2025-07-13
Payer: MEDICAID

## 2025-07-11 ENCOUNTER — HOSPITAL ENCOUNTER (OUTPATIENT)
Dept: GENERAL RADIOLOGY | Age: 60
Discharge: HOME | End: 2025-07-13
Payer: MEDICAID

## 2025-07-11 ENCOUNTER — HOSPITAL ENCOUNTER (OUTPATIENT)
Dept: INTERVENTIONAL RADIOLOGY/VASCULAR | Age: 60
Discharge: HOME | End: 2025-07-13
Payer: MEDICAID

## 2025-07-11 VITALS — OXYGEN SATURATION: 97 % | HEART RATE: 65 BPM

## 2025-07-11 DIAGNOSIS — M25.511 RIGHT SHOULDER PAIN, UNSPECIFIED CHRONICITY: ICD-10-CM

## 2025-07-11 DIAGNOSIS — M25.512 LEFT SHOULDER PAIN, UNSPECIFIED CHRONICITY: ICD-10-CM

## 2025-07-11 DIAGNOSIS — M75.42 ROTATOR CUFF IMPINGEMENT SYNDROME, LEFT: ICD-10-CM

## 2025-07-11 DIAGNOSIS — M25.811 IMPINGEMENT OF RIGHT SHOULDER: ICD-10-CM

## 2025-07-11 DIAGNOSIS — N20.0 KIDNEY STONE: ICD-10-CM

## 2025-07-11 PROCEDURE — 73222 MRI JOINT UPR EXTREM W/DYE: CPT

## 2025-07-11 PROCEDURE — 74018 RADEX ABDOMEN 1 VIEW: CPT

## 2025-07-11 PROCEDURE — 6360000004 HC RX CONTRAST MEDICATION

## 2025-07-11 PROCEDURE — 77002 NEEDLE LOCALIZATION BY XRAY: CPT

## 2025-07-11 PROCEDURE — 23350 INJECTION FOR SHOULDER X-RAY: CPT

## 2025-07-11 RX ADMIN — IOHEXOL 20 ML: 240 INJECTION, SOLUTION INTRATHECAL; INTRAVASCULAR; INTRAVENOUS; ORAL at 12:12

## 2025-07-11 NOTE — BRIEF OP NOTE
Brief Postoperative Note for Shoulder Arthrogram    Julien Batista  YOB: 1965  [id number]    Pre-operative Diagnosis:  Bilateral Shoulder Pain     Post-operative Diagnosis: Same    Procedure: Fluoroscopy guided shoulder arthrogram     Anesthesia: 1% Lidocaine     Surgeons/Assistants: Isra Pardo PA-C    Complications: none    EBL: Minimal    Specimens: Were not obtained    Successful Bilateral shoulder arthrogram.  10 mL (Bilateral) of dilute gadolinium mixture injected.  Dressing applied. MRI to follow.    Electronically signed by PRANAV Champion on 7/11/2025 at 12:38 PM

## 2025-07-11 NOTE — PROGRESS NOTES
Consent verified  LA/RN; KP/RT; MC/RT  Supine on table  JR/PA-C; bilateral shoulders prepped/draped; fluoro in use. And administered Lidocaine injection.  12:00 noon=Starting arthrogram on right shoulder.  12:03PM=Right shoulder arthrogram done. 10 mls dye injected by JR/PA-C.Bandaid applied.  12:04PM=Left shoulder arthrogram starting.  12:08PM=Left shoulder arthrogram done. 10 mls dye injected by JR/PA-C. Bandaid applied.

## 2025-07-15 ENCOUNTER — OFFICE VISIT (OUTPATIENT)
Dept: PODIATRY | Age: 60
End: 2025-07-15

## 2025-07-15 ENCOUNTER — HOSPITAL ENCOUNTER (OUTPATIENT)
Dept: PHYSICAL THERAPY | Age: 60
Setting detail: THERAPIES SERIES
Discharge: HOME | End: 2025-07-15
Payer: MEDICAID

## 2025-07-15 VITALS — HEIGHT: 76 IN | BODY MASS INDEX: 29.22 KG/M2 | WEIGHT: 240 LBS

## 2025-07-15 DIAGNOSIS — M79.671 PAIN IN RIGHT FOOT: ICD-10-CM

## 2025-07-15 DIAGNOSIS — M20.5X1 HALLUX LIMITUS OF RIGHT FOOT: Primary | ICD-10-CM

## 2025-07-15 DIAGNOSIS — M19.071 PRIMARY OSTEOARTHRITIS OF RIGHT FOOT: ICD-10-CM

## 2025-07-15 PROCEDURE — 97110 THERAPEUTIC EXERCISES: CPT

## 2025-07-15 RX ORDER — CETIRIZINE HYDROCHLORIDE 10 MG/1
10 TABLET ORAL 2 TIMES DAILY
COMMUNITY
Start: 2025-06-25

## 2025-07-15 RX ORDER — FLUTICASONE PROPIONATE 50 MCG
SPRAY, SUSPENSION (ML) NASAL
COMMUNITY
Start: 2025-05-20

## 2025-07-15 RX ORDER — BUDESONIDE AND FORMOTEROL FUMARATE DIHYDRATE 160; 4.5 UG/1; UG/1
AEROSOL RESPIRATORY (INHALATION)
COMMUNITY
Start: 2025-06-17

## 2025-07-15 ASSESSMENT — ENCOUNTER SYMPTOMS
BACK PAIN: 0
DIARRHEA: 0
SHORTNESS OF BREATH: 0
NAUSEA: 0
COLOR CHANGE: 0

## 2025-07-15 NOTE — PROGRESS NOTES
[facility] Podiatry  Return Patient Progress Note    Subjective: Julien Batista 60 y.o. male that presents for follow up evaluation of pain to the right great toe joint.  Chief Complaint   Patient presents with    Foot Pain     Right foot, injection did not help     Patient's treatment thus far has included steroid injection x 1.  Patient states that the injection did not help his pain.  Pain is rated 6 out of 10 and is described as intermittent. Patient has been following my prescribed course of therapy as instructed.     Review of Systems   Constitutional:  Negative for activity change, appetite change, chills, diaphoresis, fatigue and fever.   Respiratory:  Negative for shortness of breath.    Cardiovascular:  Negative for leg swelling.   Gastrointestinal:  Negative for diarrhea and nausea.   Endocrine: Negative for cold intolerance, heat intolerance and polyuria.   Musculoskeletal:  Positive for arthralgias. Negative for back pain, gait problem, joint swelling and myalgias.   Skin:  Negative for color change, pallor, rash and wound.   Allergic/Immunologic: Negative for environmental allergies and food allergies.   Neurological:  Negative for dizziness, weakness, light-headedness and numbness.   Hematological:  Does not bruise/bleed easily.   Psychiatric/Behavioral:  Negative for behavioral problems, confusion and self-injury. The patient is not nervous/anxious.        Objective: Clinical evaluation of the patient reveals dorsal and medial prominence to the first metatarsal head with only mild abduction of the hallux of the right foot.  There is pain with end range of motion upon dorsiflexion of the right first MTPJ.  Loading of the right forefoot increases prominence of the first metatarsal head.     Assessment:    Diagnosis Orders   1. Hallux limitus of right foot  AL FOOT LONGITUD/METATARSAL SUP      2. Primary osteoarthritis of right foot  AL FOOT LONGITUD/METATARSAL SUP      3. Pain in right foot  AL

## 2025-07-16 ENCOUNTER — RESULTS FOLLOW-UP (OUTPATIENT)
Age: 60
End: 2025-07-16

## 2025-07-16 NOTE — TELEPHONE ENCOUNTER
----- Message from Temitope Jacques PA-C sent at 7/15/2025  4:12 PM EDT -----  Please have patient follow up with Dr. Chan for MRI review. Thank you!  ----- Message -----  From: Shani Eduardo Incoming Radiant Results From [employer]  Sent: 7/15/2025   4:02 PM EDT  To: Temitope Jacques PA-C

## 2025-07-16 NOTE — TELEPHONE ENCOUNTER
Call to patient and left voicemail asking patient to call to schedule a follow up with Dr Chan to review his MRI's

## 2025-07-17 ENCOUNTER — HOSPITAL ENCOUNTER (OUTPATIENT)
Dept: PHYSICAL THERAPY | Age: 60
Setting detail: THERAPIES SERIES
Discharge: HOME | End: 2025-07-17
Payer: MEDICAID

## 2025-07-17 NOTE — FLOWSHEET NOTE
[] [facility]  Outpatient Rehabilitation &  Therapy  [address]  [phone]  [phone] [] [facility]  Outpatient Rehabilitation &  Therapy  [address]  P: [phone]  F: [phone] [] [facility]  Outpatient Rehabilitation &  Therapy  [address]  P: [phone]  F: [phone] [] [facility]  Outpatient Rehabilitation &  Therapy  [address]  [phone]  [phone] [] [facility]  Outpatient Rehabilitation &  Therapy  [address]   P: [phone]  F: [phone]  [] [facility]  Outpatient Rehabilitation &  Therapy  [address]  P: [phone]  F: [phone] [] [facility]  Outpatient Rehabilitation &  Therapy  [address]  P: [phone]  F: [phone] [] [facility]  Outpatient Rehabilitation &  Therapy  [address]  P: [phone]  F: [phone] [] [facility]  Outpatient Rehabilitation &  Therapy  [address]  P: [phone]  F: [phone]  [x] [facility] Outpatient Rehabilitation &  Therapy  [address]  P: [phone]  F: [phone] [] [facility] Outpatient Rehabilitation &  Therapy  [address]  P: [phone]   F: [phone]      Therapy Cancel/No Show note    Date: 2025  Patient: Julien Batista  : 1965  MRN: [id number]    Cancels/No Shows to date:     For today's appointment patient:    [x]  Cancelled    [] Rescheduled appointment    [] No-show     Reason given by patient:    []  Patient ill    []  Conflicting appointment    [] No transportation      [] Conflict with work    [] No reason given    [] Weather related    [] COVID-19    [x] Other:      Comments:  Pt called to cancel appointment this date, reports

## 2025-07-22 ENCOUNTER — OFFICE VISIT (OUTPATIENT)
Age: 60
End: 2025-07-22

## 2025-07-22 ENCOUNTER — HOSPITAL ENCOUNTER (OUTPATIENT)
Dept: PHYSICAL THERAPY | Age: 60
Setting detail: THERAPIES SERIES
Discharge: HOME | End: 2025-07-22
Payer: MEDICAID

## 2025-07-22 VITALS — BODY MASS INDEX: 29.22 KG/M2 | HEIGHT: 76 IN | WEIGHT: 240 LBS

## 2025-07-22 DIAGNOSIS — M19.012 OSTEOARTHRITIS OF LEFT SHOULDER, UNSPECIFIED OSTEOARTHRITIS TYPE: ICD-10-CM

## 2025-07-22 DIAGNOSIS — M25.512 CHRONIC PAIN OF BOTH SHOULDERS: Primary | ICD-10-CM

## 2025-07-22 DIAGNOSIS — G89.29 CHRONIC PAIN OF BOTH SHOULDERS: Primary | ICD-10-CM

## 2025-07-22 DIAGNOSIS — M25.511 CHRONIC PAIN OF BOTH SHOULDERS: Primary | ICD-10-CM

## 2025-07-22 PROCEDURE — 97110 THERAPEUTIC EXERCISES: CPT

## 2025-07-22 RX ADMIN — METHYLPREDNISOLONE ACETATE 80 MG: 80 INJECTION, SUSPENSION INTRA-ARTICULAR; INTRALESIONAL; INTRAMUSCULAR; SOFT TISSUE at 14:21

## 2025-07-22 RX ADMIN — LIDOCAINE HYDROCHLORIDE 2 ML: 10 INJECTION, SOLUTION INFILTRATION; PERINEURAL at 14:21

## 2025-07-22 NOTE — PROGRESS NOTES
[facility] Orthopedics & Sports Medicine      [facility] PHYSICIANS [facility] ORTHOPAEDICS AND SPORTS MEDICINE  [facility]  [place] [address]  Dept: [phone]  Dept Fax: [phone]    Chief Compliant:  Chief Complaint   Patient presents with    Follow-up     Discuss bilateral MRI - left and right shoulders        History of Present Illness:  This is a 60 y.o. male who presents to the clinic today for evaluation / follow up of bilateral shoulder MRI follow-up.  He states that the left side is worse than the right.  About 30 years ago he had a labral repair on the left side.  The right side he never had any surgery however he has been able to sublux the shoulder over the course of 30 years.  He has increasing pain.  He is able to voluntarily sublux the right shoulder here in the office quite easily.  He notices weakness in the shoulder as well.  The left side hurts him to a more significant degree he cannot lay on the side at night and hurts more constantly throughout the day he does feel crepitus and grinding in the left shoulder.       Physical Exam:    On examination today he has maintained full range of motion above his head with both arms.  He has no tenderness over the AC joint.  He does have a little bit of crepitus range of motion he has intact rotator cuff strength on both sides.  No shoulder instability no effusion.    Nursing note and vitals reviewed.     Labs and Imaging: MRI of the left shoulder shows a partial articular sided rotator cuff tear of supraspinatus I do think that there is at least moderate arthritis in the glenohumeral joint there is some postsurgical changes at the inferior aspect of the glenoid.    MRI of the right shoulder shows again just low-grade partial cuff tears.  Some chondromalacia present as well.    XR taken today:  No results found.      No orders of the defined types were placed in this encounter.      Assessment and

## 2025-07-22 NOTE — PROGRESS NOTES
[] [facility]  Outpatient Rehabilitation &  Therapy  [address]  [phone]  [phone] [] [facility]  Outpatient Rehabilitation &  Therapy  [address]  P: [phone]  F: [phone] [] [facility]  Outpatient Rehabilitation &  Therapy  [address]  P: [phone]  F: [phone] [] [facility]  Outpatient Rehabilitation &  Therapy  [address]  [phone]  [phone] [] [facility]  Outpatient Rehabilitation &  Therapy  [address]   P: [phone]  F: [phone]  [] [facility]  Outpatient Rehabilitation &  Therapy  [address]  P: [phone]  F: [phone] [] [facility]  Outpatient Rehabilitation &  Therapy  [address]  P: [phone]  F: [phone] [] [facility]  Outpatient Rehabilitation &  Therapy  [address]  P: [phone]  F: [phone] [] [facility]  Outpatient Rehabilitation &  Therapy  [address]  P: [phone]  F: [phone]  [x] [facility] Outpatient Rehabilitation &  Therapy  [address]  P: [phone]  F: [phone]     Physical Therapy Daily Treatment Note/Progress Note     Date:  2025  Patient Name:  Julien Batista    :  1965  MRN: [id number]  Physician: Temitope Jacques PA-C                          Insurance: [employer] -  visits remain, auth after  -- Will need auth at 9th visit   Medical Diagnosis: M75.42 (ICD-10-CM) - Rotator cuff impingement syndrome, left   M25.511 (ICD-10-CM) - Right shoulder pain, unspecified chronicity   M25.811 (ICD-10-CM) - Impingement of right shoulder   Z98.890 (ICD-10-CM) - S/P left knee arthroscopy                 Rehab Codes: R25 pain , M25.60 stiffness, R53.1 weakness   Onset Date: 6/10/25- referral

## 2025-07-23 RX ORDER — METHYLPREDNISOLONE ACETATE 80 MG/ML
80 INJECTION, SUSPENSION INTRA-ARTICULAR; INTRALESIONAL; INTRAMUSCULAR; SOFT TISSUE ONCE
Status: COMPLETED | OUTPATIENT
Start: 2025-07-23 | End: 2025-07-22

## 2025-07-23 RX ORDER — LIDOCAINE HYDROCHLORIDE 10 MG/ML
2 INJECTION, SOLUTION INFILTRATION; PERINEURAL ONCE
Status: COMPLETED | OUTPATIENT
Start: 2025-07-23 | End: 2025-07-22

## 2025-07-24 DIAGNOSIS — N20.0 KIDNEY STONES: Primary | ICD-10-CM

## 2025-07-30 ENCOUNTER — CLINICAL SUPPORT (OUTPATIENT)
Dept: PODIATRY | Age: 60
End: 2025-07-30

## 2025-07-31 ENCOUNTER — OFFICE VISIT (OUTPATIENT)
Dept: UROLOGY | Age: 60
End: 2025-07-31
Payer: MEDICAID

## 2025-07-31 VITALS
BODY MASS INDEX: 29.22 KG/M2 | DIASTOLIC BLOOD PRESSURE: 78 MMHG | HEART RATE: 62 BPM | HEIGHT: 76 IN | WEIGHT: 240 LBS | OXYGEN SATURATION: 98 % | TEMPERATURE: 97.6 F | SYSTOLIC BLOOD PRESSURE: 116 MMHG

## 2025-07-31 DIAGNOSIS — N20.0 KIDNEY STONES: Primary | ICD-10-CM

## 2025-07-31 PROCEDURE — 3078F DIAST BP <80 MM HG: CPT | Performed by: UROLOGY

## 2025-07-31 PROCEDURE — 3074F SYST BP LT 130 MM HG: CPT | Performed by: UROLOGY

## 2025-07-31 PROCEDURE — 99213 OFFICE O/P EST LOW 20 MIN: CPT | Performed by: UROLOGY

## 2025-07-31 ASSESSMENT — ENCOUNTER SYMPTOMS
VOMITING: 0
EYES NEGATIVE: 1
EYE REDNESS: 0
EYE PAIN: 0
COUGH: 0
ABDOMINAL PAIN: 0
COLOR CHANGE: 0
BACK PAIN: 0
ALLERGIC/IMMUNOLOGIC NEGATIVE: 1
SHORTNESS OF BREATH: 1
GASTROINTESTINAL NEGATIVE: 1
WHEEZING: 0
NAUSEA: 0

## 2025-07-31 NOTE — PROGRESS NOTES
Review of Systems   Constitutional: Negative.  Negative for appetite change, chills and fever.   HENT: Negative.     Eyes: Negative.  Negative for pain, redness and visual disturbance.   Respiratory:  Positive for shortness of breath. Negative for cough and wheezing.    Cardiovascular: Negative.  Negative for chest pain and leg swelling.   Gastrointestinal: Negative.  Negative for abdominal pain, nausea and vomiting.   Endocrine: Negative.    Genitourinary: Negative.  Negative for difficulty urinating, dysuria, flank pain, frequency, hematuria, testicular pain and urgency.   Musculoskeletal: Negative.  Negative for back pain, joint swelling and myalgias.   Skin: Negative.  Negative for color change, rash and wound.   Allergic/Immunologic: Negative.    Neurological:  Positive for dizziness. Negative for tremors, weakness, numbness and headaches.   Hematological: Negative.  Negative for adenopathy. Does not bruise/bleed easily.   Psychiatric/Behavioral: Negative.       
tablet 0    rizatriptan (MAXALT-MLT) 10 MG disintegrating tablet Take 1 tablet by mouth once as needed for Migraine May repeat in 2 hours if needed 10 tablet 3    amLODIPine (NORVASC) 10 MG tablet Take 1 tablet by mouth daily 30 tablet 3    DULoxetine (CYMBALTA) 30 MG extended release capsule Take 1 capsule by mouth daily 30 capsule 3    atorvastatin (LIPITOR) 10 MG tablet Take 1 tablet by mouth daily 90 tablet 1    MAGNESIUM-OXIDE 400 (240 Mg) MG tablet Take 1 tablet by mouth daily      albuterol sulfate HFA (PROVENTIL;VENTOLIN;PROAIR) 108 (90 Base) MCG/ACT inhaler Inhale 2 puffs into the lungs every 4 hours as needed for Wheezing or Shortness of Breath 18 g 5    glucose monitoring kit 1 kit by Does not apply route daily 1 kit 0    tamsulosin (FLOMAX) 0.4 MG capsule Take 1 capsule by mouth daily 30 capsule 0    rOPINIRole (REQUIP) 0.5 MG tablet Take 2 tablets by mouth at bedtime         Environmental/seasonal  Social History     Tobacco Use   Smoking Status Former    Current packs/day: 0.00    Average packs/day: 1 pack/day for 20.0 years (20.0 ttl pk-yrs)    Types: Cigarettes    Start date: 1989    Quit date: 2009    Years since quittin.0    Passive exposure: Never   Smokeless Tobacco Never   Tobacco Comments    quit 15 years ago     (Ifpatient a smoker, smoking cessation counseling offered)    Social History     Substance and Sexual Activity   Alcohol Use Not Currently    Comment: rarely       REVIEW OF SYSTEMS:  Review of Systems    Physical Exam:      Vitals:    25 1136   BP: 116/78   Pulse: 62   Temp: 97.6 °F (36.4 °C)   SpO2: 98%     Body mass index is 29.21 kg/m².  Patient is a 60 y.o. male in no acute distress and alert and oriented to person, place and time.  Physical Exam  Constitutional: Patient in no acute distress.  Neuro: Alert and oriented to person, place and time.  Psych: Mood normal, affect normal  Skin: No rash noted  HEENT: Head: Normocephalic andatraumatic  Conjunctivae and

## 2025-08-05 ENCOUNTER — OFFICE VISIT (OUTPATIENT)
Age: 60
End: 2025-08-05
Payer: MEDICAID

## 2025-08-05 VITALS — BODY MASS INDEX: 29.59 KG/M2 | WEIGHT: 243 LBS | HEIGHT: 76 IN

## 2025-08-05 DIAGNOSIS — M25.512 CHRONIC PAIN OF BOTH SHOULDERS: Primary | ICD-10-CM

## 2025-08-05 DIAGNOSIS — G89.29 CHRONIC PAIN OF BOTH SHOULDERS: Primary | ICD-10-CM

## 2025-08-05 DIAGNOSIS — M25.511 CHRONIC PAIN OF BOTH SHOULDERS: Primary | ICD-10-CM

## 2025-08-05 PROCEDURE — 99214 OFFICE O/P EST MOD 30 MIN: CPT | Performed by: ORTHOPAEDIC SURGERY

## 2025-08-06 PROBLEM — M25.812 IMPINGEMENT OF LEFT SHOULDER: Status: ACTIVE | Noted: 2025-08-06

## 2025-08-06 PROBLEM — M75.112 INCOMPLETE TEAR OF LEFT ROTATOR CUFF: Status: ACTIVE | Noted: 2025-08-06

## 2025-08-06 PROBLEM — M75.22 BICEPS TENDONITIS ON LEFT: Status: ACTIVE | Noted: 2025-08-06

## 2025-08-19 ENCOUNTER — OFFICE VISIT (OUTPATIENT)
Age: 60
End: 2025-08-19
Payer: MEDICAID

## 2025-08-19 VITALS
OXYGEN SATURATION: 97 % | HEIGHT: 76 IN | DIASTOLIC BLOOD PRESSURE: 93 MMHG | WEIGHT: 243 LBS | BODY MASS INDEX: 29.59 KG/M2 | SYSTOLIC BLOOD PRESSURE: 132 MMHG | HEART RATE: 58 BPM

## 2025-08-19 DIAGNOSIS — D22.9 MULTIPLE NEVI: Primary | ICD-10-CM

## 2025-08-19 DIAGNOSIS — L82.1 SEBORRHEIC KERATOSES: ICD-10-CM

## 2025-08-19 DIAGNOSIS — L81.4 SOLAR LENTIGO: ICD-10-CM

## 2025-08-19 DIAGNOSIS — D18.01 HEMANGIOMA OF SKIN: ICD-10-CM

## 2025-08-19 PROCEDURE — 3080F DIAST BP >= 90 MM HG: CPT | Performed by: DERMATOLOGY

## 2025-08-19 PROCEDURE — 99213 OFFICE O/P EST LOW 20 MIN: CPT | Performed by: DERMATOLOGY

## 2025-08-19 PROCEDURE — 99212 OFFICE O/P EST SF 10 MIN: CPT | Performed by: DERMATOLOGY

## 2025-08-19 PROCEDURE — 3075F SYST BP GE 130 - 139MM HG: CPT | Performed by: DERMATOLOGY

## 2025-08-28 ENCOUNTER — OFFICE VISIT (OUTPATIENT)
Dept: INTERNAL MEDICINE CLINIC | Age: 60
End: 2025-08-28
Payer: MEDICAID

## 2025-08-28 ENCOUNTER — HOSPITAL ENCOUNTER (OUTPATIENT)
Age: 60
Setting detail: SPECIMEN
Discharge: HOME | End: 2025-08-28

## 2025-08-28 VITALS
HEART RATE: 62 BPM | DIASTOLIC BLOOD PRESSURE: 82 MMHG | OXYGEN SATURATION: 98 % | HEIGHT: 76 IN | BODY MASS INDEX: 28.74 KG/M2 | SYSTOLIC BLOOD PRESSURE: 118 MMHG | WEIGHT: 236 LBS

## 2025-08-28 DIAGNOSIS — I95.1 ORTHOSTATIC HYPOTENSION: Primary | ICD-10-CM

## 2025-08-28 DIAGNOSIS — R42 VERTIGO: ICD-10-CM

## 2025-08-28 DIAGNOSIS — I10 ESSENTIAL HYPERTENSION: ICD-10-CM

## 2025-08-28 DIAGNOSIS — R53.83 FATIGUE, UNSPECIFIED TYPE: ICD-10-CM

## 2025-08-28 DIAGNOSIS — G43.009 MIGRAINE WITHOUT AURA AND WITHOUT STATUS MIGRAINOSUS, NOT INTRACTABLE: ICD-10-CM

## 2025-08-28 DIAGNOSIS — R11.0 NAUSEA: ICD-10-CM

## 2025-08-28 LAB
TESTOST SERPL-MCNC: 452 NG/DL (ref 193–740)
TSH SERPL DL<=0.05 MIU/L-ACNC: 2.31 UIU/ML (ref 0.27–4.2)

## 2025-08-28 PROCEDURE — 3074F SYST BP LT 130 MM HG: CPT

## 2025-08-28 PROCEDURE — 3079F DIAST BP 80-89 MM HG: CPT

## 2025-08-28 PROCEDURE — 99214 OFFICE O/P EST MOD 30 MIN: CPT

## 2025-08-28 RX ORDER — ONDANSETRON 4 MG/1
4 TABLET, ORALLY DISINTEGRATING ORAL 3 TIMES DAILY PRN
Qty: 21 TABLET | Refills: 0 | Status: SHIPPED | OUTPATIENT
Start: 2025-08-28

## 2025-08-28 ASSESSMENT — ENCOUNTER SYMPTOMS
COUGH: 0
WHEEZING: 0
SHORTNESS OF BREATH: 0
NAUSEA: 1
BACK PAIN: 0
ABDOMINAL PAIN: 0
VOMITING: 1

## 2025-08-29 ENCOUNTER — TELEPHONE (OUTPATIENT)
Age: 60
End: 2025-08-29

## 2025-08-29 DIAGNOSIS — Z01.818 PRE-OP EXAM: Primary | ICD-10-CM

## 2025-09-03 ENCOUNTER — OFFICE VISIT (OUTPATIENT)
Dept: NEUROLOGY | Age: 60
End: 2025-09-03
Payer: MEDICAID

## 2025-09-03 ENCOUNTER — TELEPHONE (OUTPATIENT)
Dept: NEUROLOGY | Age: 60
End: 2025-09-03

## 2025-09-03 VITALS
HEIGHT: 76 IN | BODY MASS INDEX: 29.22 KG/M2 | HEART RATE: 55 BPM | SYSTOLIC BLOOD PRESSURE: 120 MMHG | DIASTOLIC BLOOD PRESSURE: 85 MMHG | WEIGHT: 240 LBS

## 2025-09-03 DIAGNOSIS — G43.E19 INTRACTABLE CHRONIC MIGRAINE WITH AURA AND WITHOUT STATUS MIGRAINOSUS: Primary | ICD-10-CM

## 2025-09-03 PROCEDURE — 3079F DIAST BP 80-89 MM HG: CPT | Performed by: STUDENT IN AN ORGANIZED HEALTH CARE EDUCATION/TRAINING PROGRAM

## 2025-09-03 PROCEDURE — 3074F SYST BP LT 130 MM HG: CPT | Performed by: STUDENT IN AN ORGANIZED HEALTH CARE EDUCATION/TRAINING PROGRAM

## 2025-09-03 PROCEDURE — 99214 OFFICE O/P EST MOD 30 MIN: CPT | Performed by: STUDENT IN AN ORGANIZED HEALTH CARE EDUCATION/TRAINING PROGRAM

## 2025-09-03 RX ORDER — DULOXETIN HYDROCHLORIDE 60 MG/1
60 CAPSULE, DELAYED RELEASE ORAL DAILY
Qty: 30 CAPSULE | Refills: 3 | Status: SHIPPED | OUTPATIENT
Start: 2025-09-03

## (undated) DEVICE — SOLUTION IRRIG 3000ML 0.9% SOD CHL USP UROMATIC PLAS CONT

## (undated) DEVICE — SINGLE ACTION PUMPING SYSTEM

## (undated) DEVICE — DEFENDO AIR WATER SUCTION AND BIOPSY VALVE KIT FOR  OLYMPUS: Brand: DEFENDO AIR/WATER/SUCTION AND BIOPSY VALVE

## (undated) DEVICE — ADAPTER URO SCP UROLOK LL

## (undated) DEVICE — GLOVE ORANGE PI 7 1/2   MSG9075

## (undated) DEVICE — SOLUTION IRRIG 1000ML 0.9% SOD CHL USP POUR PLAS BTL

## (undated) DEVICE — FORCEPS BX 240CM 2.4MM L NDL RAD JAW 4 M00513334

## (undated) DEVICE — GLOVE SURG SZ 8 L12IN THK75MIL DK GRN LTX FREE

## (undated) DEVICE — GUIDEWIRE ENDOSCP L150CM DIA0.025IN TIP L3CM NIT HYDRPHLC

## (undated) DEVICE — APPLICATOR MEDICATED 26 CC SOLUTION HI LT ORNG CHLORAPREP

## (undated) DEVICE — GLOVE SURG SZ 65 L12IN THK75MIL DK GRN LTX FREE

## (undated) DEVICE — PADDING CAST W6INXL4YD COT LO LINTING WYTEX

## (undated) DEVICE — GARMENT,MEDLINE,DVT,INT,CALF,MED, GEN2: Brand: MEDLINE

## (undated) DEVICE — PACK PROCEDURE SURG CYSTO SVMMC LF

## (undated) DEVICE — SOL IRR SOD CHL 0.9% TITAN XL CNTNR 3000ML

## (undated) DEVICE — BLADE SHV L13CM DIA4MM EXCALIBUR AGG COOLCUT

## (undated) DEVICE — STOCKINETTE,IMPERVIOUS,12X48,STERILE: Brand: MEDLINE

## (undated) DEVICE — GLOVE SURG SZ 65 L12IN FNGR THK126MIL CRM LTX FREE

## (undated) DEVICE — SOLUTION IRRIG 1000ML STRL H2O USP PLAS POUR BTL

## (undated) DEVICE — DRAPE,REIN 53X77,STERILE: Brand: MEDLINE

## (undated) DEVICE — MASTISOL ADHESIVE LIQ 2/3ML

## (undated) DEVICE — STRIP,CLOSURE,WOUND,MEDI-STRIP,1/2X4: Brand: MEDLINE

## (undated) DEVICE — DUAL LUMEN URETERAL CATHETER

## (undated) DEVICE — TUBING PMP L16FT MAIN DISP FOR AR-6400 AR-6475 Â€“ ORDER MULTIPLES OF 10 EACH

## (undated) DEVICE — SOLUTION IRRIG 1000ML H2O PIC PLAS SHATTERPROOF CONTAINER

## (undated) DEVICE — DISPOSABLE TOURNIQUET CUFF SINGLE BLADDER, SINGLE PORT AND QUICK CONNECT CONNECTOR: Brand: COLOR CUFF

## (undated) DEVICE — FIBER LSR FLEXIVA PULSE TRACTIP 242 BOX

## (undated) DEVICE — 3M™ STERI-STRIP™ COMPOUND BENZOIN TINCTURE 40 BAGS/CARTON 4 CARTONS/CASE C1544: Brand: 3M™ STERI-STRIP™

## (undated) DEVICE — GUIDEWIRE URO L150CM DIA .035IN STIFF NIT HYDRPHL STR TIP

## (undated) DEVICE — DRAPE,ARTHRO,W/POUCH,STERILE: Brand: MEDLINE

## (undated) DEVICE — SNARE ENDOSCP AD L240CM LOOP W10MM SHTH DIA2.4MM RND INSUL

## (undated) DEVICE — FIBER LASER HOLM 11046] FORTEC MEDICAL INC]

## (undated) DEVICE — MHPB ARTHROSCOPY PACK: Brand: MEDLINE INDUSTRIES, INC.

## (undated) DEVICE — GUIDEWIRE URO L150CM DIA0.035IN STIFF NIT HYDRPHLC STR TIP

## (undated) DEVICE — GLOVE ORANGE PI 7   MSG9070

## (undated) DEVICE — STRAP,POSITIONING,KNEE/BODY,FOAM,4X60": Brand: MEDLINE

## (undated) DEVICE — ST CHARLES CYSTO PACK: Brand: MEDLINE INDUSTRIES, INC.

## (undated) DEVICE — SUTURE PROL SZ 3-0 L18IN NONABSORBABLE BLU L19MM PS-2 3/8 8687H

## (undated) DEVICE — CLOTH PRE OP W9XL10.5IN 2% CHG FRAGRANCE RNS FREE READYPREP

## (undated) DEVICE — ST CHARLES CYSTO: Brand: MEDLINE INDUSTRIES, INC.

## (undated) DEVICE — BANDAGE COBAN 6 IN WND 6INX5YD FOAM

## (undated) DEVICE — GLOVE ORTHO 7 1/2   MSG9475

## (undated) DEVICE — GLOVE ORANGE PI 8   MSG9080

## (undated) DEVICE — ENDO KIT W/SYRINGE: Brand: MEDLINE INDUSTRIES, INC.